# Patient Record
Sex: FEMALE | HISPANIC OR LATINO | Employment: STUDENT | URBAN - NONMETROPOLITAN AREA
[De-identification: names, ages, dates, MRNs, and addresses within clinical notes are randomized per-mention and may not be internally consistent; named-entity substitution may affect disease eponyms.]

---
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
## 2021-05-14 ENCOUNTER — HOSPITAL ENCOUNTER (EMERGENCY)
Facility: HOSPITAL | Age: 21
Discharge: HOME | End: 2021-05-14
Admitting: EMERGENCY MEDICINE
Payer: COMMERCIAL

## 2021-05-14 VITALS
RESPIRATION RATE: 16 BRPM | OXYGEN SATURATION: 99 % | HEART RATE: 73 BPM | SYSTOLIC BLOOD PRESSURE: 112 MMHG | TEMPERATURE: 97.4 F | DIASTOLIC BLOOD PRESSURE: 57 MMHG

## 2021-05-14 DIAGNOSIS — Z32.01 POSITIVE URINE PREGNANCY TEST: Primary | ICD-10-CM

## 2021-05-14 DIAGNOSIS — R30.0 DYSURIA: ICD-10-CM

## 2021-05-14 LAB
BACTERIA UR QL AUTO: NORMAL /HPF
BILIRUB UR QL STRIP: NEGATIVE
CLARITY UR: CLEAR
COLOR UR: YELLOW
EXT PREG TEST URINE: POSITIVE
EXT. CONTROL ED NAV: ABNORMAL
GLUCOSE UR STRIP-MCNC: NEGATIVE MG/DL
HGB UR QL STRIP.AUTO: NEGATIVE
KETONES UR STRIP-MCNC: NEGATIVE MG/DL
LEUKOCYTE ESTERASE UR QL STRIP: ABNORMAL
NITRITE UR QL STRIP: NEGATIVE
NON-SQ EPI CELLS URNS QL MICRO: NORMAL /HPF
PH UR STRIP.AUTO: 6 [PH]
PROT UR STRIP-MCNC: NEGATIVE MG/DL
RBC #/AREA URNS AUTO: NORMAL /HPF
SP GR UR STRIP.AUTO: 1.02 (ref 1–1.03)
UROBILINOGEN UR QL STRIP.AUTO: 0.2 E.U./DL
WBC #/AREA URNS AUTO: NORMAL /HPF

## 2021-05-14 PROCEDURE — 99283 EMERGENCY DEPT VISIT LOW MDM: CPT

## 2021-05-14 PROCEDURE — 87086 URINE CULTURE/COLONY COUNT: CPT | Performed by: PHYSICIAN ASSISTANT

## 2021-05-14 PROCEDURE — 99282 EMERGENCY DEPT VISIT SF MDM: CPT | Performed by: PHYSICIAN ASSISTANT

## 2021-05-14 PROCEDURE — 81025 URINE PREGNANCY TEST: CPT | Performed by: PHYSICIAN ASSISTANT

## 2021-05-14 PROCEDURE — 81001 URINALYSIS AUTO W/SCOPE: CPT | Performed by: PHYSICIAN ASSISTANT

## 2021-05-15 NOTE — DISCHARGE INSTRUCTIONS
You will be notified of urine culture results if positive  Please schedule follow up with OBGYN to establish care  Return immediately with any new or worsening symptoms

## 2021-05-15 NOTE — ED PROVIDER NOTES
History  Chief Complaint   Patient presents with    Vaginal Itching     onset few days ago      [place] An[place] is an otherwise healthy and well-appearing 29-year-old female arriving ambulatory to the emergency department for evaluation of dysuria x 2-3 days  She denies associated symptoms including urinary urgency, frequency, hematuria  She denies abdominal pain, flank pain, back pain  She denies abnormal vaginal bleeding or discharge,  rashes or lesions  Admits to chance of pregnancy, LMP first week of April  The patient admits that she is sexually active and uses Nuvaring for contraception  Denies prior history of urinary tract infections  She offers no constitutional complaints denying fevers, chills, sweats, appetite changes, nausea or episodes of vomiting  History provided by:  Patient      None       Past Medical History:   Diagnosis Date    Asthma        History reviewed  No pertinent surgical history  History reviewed  No pertinent family history  I have reviewed and agree with the history as documented  E-Cigarette/Vaping     E-Cigarette/Vaping Substances     Social History     Tobacco Use    Smoking status: Never Smoker    Smokeless tobacco: Never Used   Substance Use Topics    Alcohol use: Not Currently    Drug use: Not Currently       Review of Systems   Constitutional: Negative for chills, diaphoresis, fatigue and fever  Genitourinary: Positive for dysuria  Negative for flank pain, frequency, genital sores, hematuria, urgency, vaginal bleeding and vaginal discharge  Skin: Negative for rash  All other systems reviewed and are negative  Physical Exam  Physical Exam  Vitals signs and nursing note reviewed  Constitutional:       General: She is not in acute distress  Appearance: Normal appearance  She is well-developed  She is not ill-appearing, toxic-appearing or diaphoretic  HENT:      Head: Normocephalic and atraumatic     Eyes:      Conjunctiva/sclera: Conjunctivae normal       Pupils: Pupils are equal, round, and reactive to light  Neck:      Musculoskeletal: Normal range of motion and neck supple  Cardiovascular:      Rate and Rhythm: Normal rate and regular rhythm  Heart sounds: Normal heart sounds  Pulmonary:      Effort: Pulmonary effort is normal  No respiratory distress  Breath sounds: Normal breath sounds  No wheezing  Abdominal:      General: Bowel sounds are normal  There is no distension  Palpations: Abdomen is soft  Tenderness: There is no abdominal tenderness  There is no guarding or rebound  Comments: Abdomen soft, non-tender, non-distended  No peritoneal signs  Genitourinary:     Comments: Deferred  Musculoskeletal: Normal range of motion  General: No tenderness  Skin:     General: Skin is warm and dry  Capillary Refill: Capillary refill takes less than 2 seconds  Neurological:      General: No focal deficit present  Mental Status: She is alert  Mental status is at baseline     Psychiatric:         Mood and Affect: Mood normal          Vital Signs  ED Triage Vitals [05/14/21 2251]   Temperature Pulse Respirations Blood Pressure SpO2   (!) 97 4 °F (36 3 °C) 73 16 112/57 99 %      Temp src Heart Rate Source Patient Position - Orthostatic VS BP Location FiO2 (%)   -- Monitor Sitting Right arm --      Pain Score       --           Vitals:    05/14/21 2251   BP: 112/57   Pulse: 73   Patient Position - Orthostatic VS: Sitting         Visual Acuity      ED Medications  Medications - No data to display    Diagnostic Studies  Results Reviewed     Procedure Component Value Units Date/Time    Urine culture [[id number]] Collected: 05/14/21 2308    Lab Status: Final result Specimen: Urine, Clean Catch Updated: 05/16/21 0722     Urine Culture No Growth <1000 cfu/mL    Urine Microscopic [[id number]]  (Normal) Collected: 05/14/21 2308    Lab Status: Final result Specimen: Urine, Clean Catch Updated: 05/14/21 2322     RBC, UA 0-1 /hpf      WBC, UA 2-4 /hpf      Epithelial Cells Occasional /hpf      Bacteria, UA Occasional /hpf     UA w Reflex to Microscopic w Reflex to Culture [[id number]]  (Abnormal) Collected: 05/14/21 2308    Lab Status: Final result Specimen: Urine, Clean Catch Updated: 05/14/21 2315     Color, UA Yellow     Clarity, UA Clear     Specific Gravity, UA 1 025     pH, UA 6 0     Leukocytes, UA Trace     Nitrite, UA Negative     Protein, UA Negative mg/dl      Glucose, UA Negative mg/dl      Ketones, UA Negative mg/dl      Urobilinogen, UA 0 2 E U /dl      Bilirubin, UA Negative     Blood, UA Negative    POCT pregnancy, urine [[id number]]  (Abnormal) Resulted: 05/14/21 2300    Lab Status: Final result Updated: 05/14/21 2301     EXT PREG TEST UR (Ref: Negative) positive     Control valid                 No orders to display              Procedures  Procedures         ED Course  ED Course as of May 18 1232   Fri May 14, 2021   2302 PREGNANCY TEST URINE: positive                                           MDM  Number of Diagnoses or Management Options  Dysuria: new and requires workup  Positive urine pregnancy test: new and requires workup  Diagnosis management comments: This is an otherwise healthy and well-appearing 80-year-old female reporting dysuria times 2-3 days  She has no associated complaints with this, denying urinary urgency, frequency, hematuria  She denies any abnormal vaginal bleeding or discharge  Her last menstrual period was through the 1st week of April, admits to chance of pregnancy  Currently sexually active in a monogamous relationship with 1 partner  Uses NuvaRing for contraception  She has no abdominal pain, nausea or episodes of vomiting, abnormal vaginal bleeding, abnormal vaginal discharge      Differential diagnosis includes but not limited to:  Urinary tract infection, dysuria, cystitis, urethritis, pregnancy; patient declined STI testing    Initial ED plan: Ua/u preg    Final ED Assessment:  Vital signs stable on hospital arrival, examination as documented above which is largely unremarkable  Urine pregnancy test positive  The patient's urinalysis is without evidence of urinary tract infection  Given report of dysuria, will add on urine culture for further evaluation  Plan to defer initiating antibiotics while awaiting culture data  The patient was updated of testing results  Will provide OBGYN follow-up for the patient to establish care and re-evaluation  Strict hospital return precautions discussed including but not limited to severe abdominal pain, abnormal vaginal bleeding, intractable nausea or vomiting, fevers, chills, sweats, or any other new or worrisome symptoms  The patient had verbalized understanding was agreeable with disposition plan  She has remained stable during hospital course, and she is stable for hospital discharge at this time  Amount and/or Complexity of Data Reviewed  Clinical lab tests: ordered and reviewed  Review and summarize past medical records: yes  Independent visualization of images, tracings, or specimens: yes    Risk of Complications, Morbidity, and/or Mortality  Presenting problems: low  Diagnostic procedures: low  Management options: low    Patient Progress  Patient progress: stable      Disposition  Final diagnoses:   Positive urine pregnancy test   Dysuria     Time reflects when diagnosis was documented in both MDM as applicable and the Disposition within this note     Time User Action Codes Description Comment    5/14/2021 11:37 PM Simón Rodrigues Add [Z32 01] Positive urine pregnancy test     5/14/2021 11:37 PM Simón Rodrigues Add [R30 0] Dysuria       ED Disposition     ED Disposition Condition Date/Time Comment    Discharge Stable Fri May 14, 2021 11:36 PM Jayda Mckeon discharge to home/self care              Follow-up Information     Follow up With Specialties Details Why Contact Info Additional Information    Your Primary Care Provider   Please schedule follow up as needed      [address] [facility] Obstetrics and Gynecology Call   C/Mayo Atkinson  [facility] [phone] [address] [facility], C/Mayo Atkinson [facility], [address]   [phone]    Yamileth Yeager MD Obstetrics and Gynecology, Obstetrics, Gynecology Call   [address] Emergency Department Emergency Medicine  If symptoms worsen [address] [facility] [id number] [facility] Emergency Department, [address] [facility], [place], [place], [address]          There are no discharge medications for this patient  No discharge procedures on file      PDMP Review     None          ED Provider  Electronically Signed by           Keegan Lennon PA-C  05/18/21 1455

## 2021-05-16 LAB — BACTERIA UR CULT: NORMAL

## 2021-06-01 NOTE — PATIENT INSTRUCTIONS
First Trimester Pregnancy   WHAT YOU NEED TO KNOW:   The first trimester of pregnancy lasts from your last period through the 12th week of pregnancy  Follow up with your healthcare providers for prenatal care  Regular prenatal care can help to keep you and your baby healthy  DISCHARGE INSTRUCTIONS:   Return to the emergency department if:   · You have pain or cramping in your abdomen or low back  · You have severe vaginal bleeding or clotting  Call your doctor or obstetrician if:   · You have light bleeding  · You have chills or a fever  · You have vaginal itching, burning, or pain  · You have yellow, green, white, or foul-smelling vaginal discharge  · You have pain or burning when you urinate, less urine than usual, or pink or bloody urine  · You have questions or concerns about your condition or care  Follow up with your doctor or obstetrician as directed:  Go to all of your prenatal visits during your pregnancy  Write down your questions so you remember to ask them during your visits  Prenatal care:  Prenatal care is a series of visits with your healthcare provider throughout your pregnancy  Prenatal care can help prevent problems during pregnancy and childbirth  At each prenatal visit, your healthcare provider will weigh you and check your blood pressure  Your healthcare provider will also check your baby's heartbeat and growth  You may also need the following at some visits:  · A pelvic exam  allows your healthcare provider to see your cervix (the bottom part of your uterus)  Your healthcare provider will use a speculum to open your vagina  He or she will check the size and shape of your uterus  · Blood tests  may be done to check for any of the following:     ? Gestational diabetes or anemia (low iron level)    ? Blood type or Rh factor, or certain birth defects    ? Immunity to certain diseases, such as chickenpox or rubella    ?  An infection, such as a sexually transmitted infection, HIV, or hepatitis B    · Hepatitis B  may need to be prevented or treated  Hepatitis B is inflammation of the liver caused by the hepatitis B virus (HBV)  HBV can spread from a mother to her baby during delivery  You will be checked for HBV as early as possible in the first trimester of each pregnancy  You need the test even if you received the hepatitis B vaccine or were tested before  You may need to have an HBV infection treated before you give birth  · Urine tests  may also be done to check for sugar and protein  These can be signs of gestational diabetes or preeclampsia  Urine tests may also be done to check for signs of infection  · A fetal ultrasound  shows pictures of your baby inside your uterus  The pictures are used to check your baby's development, movement, and position  Your healthcare provider may be able to tell you if you are having a boy or a girl during the ultrasound  This is usually possible at around 18 to 22 weeks  · Genetic disorder screening tests  may be offered to you  These screening tests check your baby's risk for genetic disorders such as Down syndrome  A screening test includes a blood test and ultrasound  Stay healthy during pregnancy:   · Take prenatal vitamins as directed  Prenatal vitamins can help you get the amount of vitamins and minerals you need during pregnancy  Prenatal vitamins may also decrease the risk of certain birth defects  · Eat a variety of healthy foods  Healthy foods include fruits, vegetables, whole-grain breads, low-fat dairy products, beans, turkey and chicken, and lean red meat  Ask your healthcare provider for more information about foods that are healthy and safe to eat during pregnancy  · Drink liquids as directed  Ask how much liquid to drink each day and which liquids are best for you  Some healthy liquids include milk, water, and juice  It is not clear how caffeine affects pregnancy   Limit your intake of caffeine to less than 200 mg each day to avoid possible health problems  Caffeine may be found in coffee, tea, cola, sports drinks, and chocolate  Do not drink alcohol  · Talk to your healthcare provider before you take medicines  Many medicines can harm your baby, especially during early pregnancy  Ask your healthcare provider before you take any medicines, including over-the-counter medicines, vitamins, herbs, or food supplements  Never use illegal or street drugs while you are pregnant  Talk to your healthcare provider if you are having trouble quitting street drugs  · Exercise as directed  Ask your healthcare provider about the best exercise plan for you  Exercise may help you feel better and make your labor and delivery easier  · Do not smoke  Smoking increases your risk of a miscarriage and other health problems during your pregnancy  Smoking can cause your baby to be born too early or weigh less at birth  Quit smoking as soon as you think you might be pregnant  Ask your healthcare provider for information if you need help quitting  Safety tips:   · Do not use a hot tub or sauna  while you are pregnant, especially during your first trimester  Hot tubs and saunas may raise your baby's temperature and increase the risk of birth defects  It also increases your risk of miscarriage  · Protect yourself from illness  Toxoplasmosis is a disease that can cause birth defects  To protect yourself from this disease, do not clean your cat's litter box yourself  Ask someone else to clean your cat's litter box while you are pregnant  Also, do not  eat raw meat or unwashed fruits and vegetables  Wash your hands after you touch raw meat, and eat only well-cooked meat  Wash fruits and vegetables well before you eat them  © Copyright [address] Information is for End User's use only and may not be sold, redistributed or otherwise used for commercial purposes   All illustrations and images included in CareNotes® are the copyrighted property of [employer]  or [facility] [employer]tan Mason   The above information is an  only  It is not intended as medical advice for individual conditions or treatments  Talk to your doctor, nurse or pharmacist before following any medical regimen to see if it is safe and effective for you  Iron Rich Diet   WHAT YOU NEED TO KNOW:   What is an iron-rich diet? An iron-rich diet includes foods that are good sources of iron  People need extra iron during childhood, adolescence (teenage years), and pregnancy  Iron is a mineral that your body needs to make hemoglobin  Hemoglobin is part of your blood and helps carry oxygen from your lungs to the rest of your body  You may need to eat more iron-rich foods to treat or prevent a low blood iron level or iron deficiency anemia  How much iron do I need each day? · Males:      ? 3to 1years old: 7 mg    ? 3to 6years old: 10 mg    ? 5to 15years old: 8 mg    ? 15to 25years old: 11 mg    ? 19 years and older: 8 mg    · Females:      ? 3to 1years old: 7 mg    ? 3to 6years old: 10 mg    ? 5to 15years old: 8 mg    ? 15to 25years old: 15 mg    ? 19 to 50 years: 18 mg    ? Over 46years old: 8 mg    ? Pregnant women:  27 mg    Which foods contain iron? · Meat, fish, and poultry are good sources of iron  They contain heme iron, a form of iron that your body absorbs very well  Fruit, vegetables, eggs, and grains such as pasta, rice, and cereal also contain iron  They contain nonheme iron, a form of iron that is not absorbed as well as heme iron  You can absorb more iron from these foods by eating a food that is high in vitamin C at the same time  You can also absorb more nonheme iron by eating a food from the meat, fish, and poultry group at the same time  · Fish and shellfish contain some mercury, a metal that can be harmful  Children and unborn babies are at higher risk for harm caused by mercury   Children and pregnant women should avoid eating fish high in mercury, such as shark and swordfish  They should also eat only fish that are lower in mercury, such as salmon, canned light tuna, and catfish  Limit the amount of low-mercury fish and shellfish you eat to less than 12 ounces per week  What are some iron-rich foods? · Foods that contain 2 mg or more per serving:      ? 3 ounces of cooked beef (saravanan, eye of round) or cooked turkey (dark meat)    ? ½ cup of beans (black, kidney, or lentil, or soybeans)    ? ½ cup of tofu    ? 1 medium baked potato    ? 1 cup of cooked artichoke or cooked spinach    ? ¾ cup of instant oatmeal    ? 1 cup of corn flakes    · Foods that contain 1 to 2 mg per serving:      ? 3 ounces of chicken    ? 3 ounces of pork    ? 3 ounces of turkey (light meat)    ? 3 ounces of light tuna    ? ½ cup of seedless, packed raisins    ? 1 slice of whole-wheat or white bread    What are good sources of vitamin C? Eat a serving of vitamin C with any iron-rich food to help your body absorb more iron  The following fruits and vegetables are good sources of vitamin C:  · 1 cup of fresh orange juice (124 mg) or pink grapefruit juice (83 mg)    · 1 cup of strawberries (106 mg)    · 1 cup of diced cantaloupe (68 mg)     · 1 cup of sweet yellow pepper (283 mg)    · 1 cup of fresh, boiled broccoli (116 mg) or cooked brussels sprouts (97 mg)    · 1 cup of kale (53 mg)    · 1 cup of tomato juice (45 mg)    What other guidelines should I follow? · Tea and coffee can decrease the amount of iron that your body absorbs from iron-rich foods  Drink coffee and tea separately from meals that contain iron-rich foods  · Children over the age of 1 year only need about 24 ounces of cow's milk each day  When children drink too much milk, they may eat fewer iron-rich foods  This may cause them to have a low level of iron in their blood  What are the risks of not following an iron-rich diet?   If you do not include iron-rich foods and vitamin C in your diet every day, you may have low blood iron levels  This may lead to iron deficiency anemia, especially during periods when your body needs extra iron  Iron deficiency anemia may cause problems with your child's growth and development  If you have iron deficiency anemia, you may develop other health problems  CARE AGREEMENT:   You have the right to help plan your care  Discuss treatment options with your healthcare provider to decide what care you want to receive  You always have the right to refuse treatment  The above information is an  only  It is not intended as medical advice for individual conditions or treatments  Talk to your doctor, nurse or pharmacist before following any medical regimen to see if it is safe and effective for you  © Copyright [address] Information is for End User's use only and may not be sold, redistributed or otherwise used for commercial purposes   All illustrations and images included in CareNotes® are the copyrighted property of [employer] Inc  or [address] [place] [address]

## 2021-06-01 NOTE — PROGRESS NOTES
Technician: Study performed by the interpreting Certified Nurse Practitioner    Indications:  amenorrhea       Prior to use, disinfection was performed with [employer] Disinfection Process  Probe Serial Number#                                          [id number] [facility])      LMP 3/29/2021   (9 weeks 2 days gestational age based on dates)            Procedure Details  A gestational sac is seen containing a yolk sac and a fernández embryo  The embryonic crown-rump length measures 3 25 cm  and calculates to an estimated gestational age of 9 weeks and 1 Day  Embryonic cardiac activity is present @ 160 BPM     Findings:   Viable, fernández intrauterine pregnancy at 10 weeks,  1 day, (++change to PEPE)  with a calculated PEPE of 21    Plan to RTO for OB Intake  Reviewed First Trimester Screening, pt will consider

## 2021-06-02 ENCOUNTER — ULTRASOUND (OUTPATIENT)
Dept: OBGYN CLINIC | Facility: CLINIC | Age: 21
End: 2021-06-02
Payer: COMMERCIAL

## 2021-06-02 VITALS
BODY MASS INDEX: 26.7 KG/M2 | HEIGHT: 61 IN | DIASTOLIC BLOOD PRESSURE: 68 MMHG | WEIGHT: 141.4 LBS | SYSTOLIC BLOOD PRESSURE: 110 MMHG

## 2021-06-02 DIAGNOSIS — N91.2 AMENORRHEA: Primary | ICD-10-CM

## 2021-06-02 PROCEDURE — 76817 TRANSVAGINAL US OBSTETRIC: CPT | Performed by: NURSE PRACTITIONER

## 2021-06-11 ENCOUNTER — TELEMEDICINE (OUTPATIENT)
Dept: OBGYN CLINIC | Facility: CLINIC | Age: 21
End: 2021-06-11
Payer: COMMERCIAL

## 2021-06-11 DIAGNOSIS — Z34.01 ENCOUNTER FOR SUPERVISION OF NORMAL FIRST PREGNANCY IN FIRST TRIMESTER: Primary | ICD-10-CM

## 2021-06-11 PROCEDURE — T1001 NURSING ASSESSMENT/EVALUATN: HCPCS | Performed by: NURSE PRACTITIONER

## 2021-06-11 PROCEDURE — 99211 OFF/OP EST MAY X REQ PHY/QHP: CPT | Performed by: NURSE PRACTITIONER

## 2021-06-11 RX ORDER — PNV NO.95/FERROUS FUM/FOLIC AC 28MG-0.8MG
TABLET ORAL
COMMUNITY

## 2021-06-11 RX ORDER — LORATADINE 10 MG/1
10 TABLET ORAL DAILY
COMMUNITY

## 2021-06-11 NOTE — PROGRESS NOTES
OB INTAKE INTERVIEW  Patient is 24y o y o  year old who presents for OB intake at 11-3wks  She is accompanied by: phone   The father of her baby Bahman Chew) is involved in the pregnancy and is 23years old    Last Menstrual Period: 3/29/2021  Ultrasound: Measured 10 weeks 1 days on 2021  Estimated Date of Delivery: 2021 via LMP    Signs/Symptoms of Pregnancy  Current pregnancy symptoms: none  Constipation no  Headaches no  Cramping/spotting no  PICA cravings no    Diabetes-    If patient has 1 or more, please order early 1 hour GTT  History of GDM no  BMI >35 no  History of PCOS or current metformin use no  History of LGA/macrosomic infant (4000g/9lbs) no    If patient has 2 or more, please order early 1 hour GTT  BMI>30 no  AMA no  First degree relative with type 2 diabetes no  History of chronic HTN, hyperlipidemia, elevated A1C no  High risk race (, , ,  or ) YES    Hypertension- if you answer yes, please order preeclampsia labs (comprehensive metabolic panel, urine protein creatinine ratio, 24 hour urine)  History of of chronic HTN no  History of gestational HTN no  History of preeclampsia, eclampsia, or HELLP syndrome no  History of diabetes no  History of lupus, autoimmune disease, kidney disease no    Thyroid- if yes order TSH with reflex T4  History of thyroid disease no    Bleeding Disorder or Hx of DVT-patient or first degree relative with history of  Order the following if not done previously     (Factor V, antithrombin III, prothrombin gene mutation, protein C and S Ag, lupus anticoagulant, anticardiolipin, beta-2 glycoprotein)   no    OB/GYN-  History of abnormal pap smear no  History of HPV no  History of Herpes/HSV no  History of other STI (gonorrhea, chlamydia, trich) no  History of prior  no  History of prior  no  History of  delivery prior to 36 weeks 6 days no  History of blood transfusion no  Ok for blood transfusion yes    Substance screening- if yes outside of tobacco for her or anyone in her home-order urine drug screen  History of tobacco use no  Currently using tobacco no  Currently using alcohol no  Presently using drugs no  Past drug use  YES-marijuana  IV drug use-If yes add Hep C antibody to labs no  Partner drug use YES-marijuana  Parent/Family drug use no    MRSA Screening-   Does the pt have a hx of MRSA? no  If yes- please follow MRSA protocol and obtain a nasal swab for MRSA culture    Immunizations:  Influenza vaccine given this season no  Discussed Tdap vaccine yes  Discussed COVID Vaccine yes- pt declined    Genetic/MFM-  Do you or your partner have a history of any of the following in yourselves or first degree relatives? Cystic fibrosis no  Spinal muscular atrophy no  Hemoglobinopathy/Sickle Cell/Thalassemia no  Fragile X Intellectual Disability no    If yes, discuss carrier screening and recommend consultation with [facility]/genetic counseling  If no, discuss option for carrier screening and/or genetic testing with Nuchal Ul[facility]  Patient interested yes  Appointment at [facility] made no - pt to call for appt today    Interview education  [facility] reviewed and discussed yes    Nurse/Family Partnership- patient may qualify no; referral placed no    Prenatal lab work scripts   Extra labs ordered:  no    The patient has a history now or in prior pregnancy notable for:          Details that I feel the provider should be aware of: Pt lives with Lauryn MARIA, states he is supportive of pregnancy  PN1 visit scheduled  The patient was oriented to our practice, reviewed delivering physicians and [employer] for Delivery  All questions were answered  Pn phone interview completed  Pn bldwk ordered in epic  - encouraged pt to have completed prior to PN1 visit on 2021  Referral for [facility]- pt to call today in order to schedule appt-  Is interested in sequential screen   Did not receive flu vaccine this past season & is not interested in receiving Covid vaccines  Advised to call with any further questions/concerns       Interviewed by: Jana Valladares RN, [address] [facility]

## 2021-06-17 ENCOUNTER — INITIAL PRENATAL (OUTPATIENT)
Dept: OBGYN CLINIC | Facility: CLINIC | Age: 21
End: 2021-06-17
Payer: COMMERCIAL

## 2021-06-17 VITALS
DIASTOLIC BLOOD PRESSURE: 60 MMHG | HEIGHT: 61 IN | BODY MASS INDEX: 26.85 KG/M2 | WEIGHT: 142.2 LBS | SYSTOLIC BLOOD PRESSURE: 116 MMHG

## 2021-06-17 DIAGNOSIS — J45.20 MILD INTERMITTENT ASTHMA WITHOUT COMPLICATION: ICD-10-CM

## 2021-06-17 DIAGNOSIS — Z76.89 ESTABLISHING CARE WITH NEW DOCTOR, ENCOUNTER FOR: ICD-10-CM

## 2021-06-17 DIAGNOSIS — Z34.01 ENCOUNTER FOR PRENATAL CARE IN FIRST TRIMESTER OF FIRST PREGNANCY: Primary | ICD-10-CM

## 2021-06-17 DIAGNOSIS — Z86.59 HISTORY OF DEPRESSION: ICD-10-CM

## 2021-06-17 DIAGNOSIS — Z11.3 SCREENING FOR STD (SEXUALLY TRANSMITTED DISEASE): ICD-10-CM

## 2021-06-17 LAB
SL AMB  POCT GLUCOSE, UA: NEGATIVE
SL AMB POCT URINE PROTEIN: NEGATIVE

## 2021-06-17 PROCEDURE — 99213 OFFICE O/P EST LOW 20 MIN: CPT | Performed by: NURSE PRACTITIONER

## 2021-06-17 NOTE — PROGRESS NOTES
PNV1 12w2d    Patient denies any lof, vb or ctx  Patient urine is negative for glucose and protein  Patient has no concerns today

## 2021-06-17 NOTE — ASSESSMENT & PLAN NOTE
PN-1  Denies any problems today  Reviewed that pt needs to complete her OB labs  G/C added, missed urine collection today  MFM appt  scheduled on 6/24/21    No FM yet  First pregnancy  She plans to breast feed  [facility] reviewed,  Vikki reviewed in detail with pt  Declines NFP  Will need TDap@ 28 wks/Fridge sheet & Perineal massage in 3rd trimester  Reviewed SAB precautions, Call with any problems, all questions and concerns answered

## 2021-06-17 NOTE — PATIENT INSTRUCTIONS
Pregnancy at 11 to [address]:   What changes are happening in my body? You are now at the end of your first trimester and entering your second trimester  Morning sickness usually goes away by this time  You may have other symptoms such as fatigue, frequent urination, and headaches  You may have gained 2 to 4 pounds by now  How do I care for myself at this stage of my pregnancy? · Get plenty of rest   You may feel more tired than normal  You may need to take naps or go to bed earlier  · Manage nausea and vomiting  Avoid fatty and spicy foods  Eat small meals throughout the day instead of large meals  Mamie may help to decrease nausea  Ask your healthcare provider about other ways of decreasing nausea and vomiting  · Eat a variety of healthy foods  Healthy foods include fruits, vegetables, whole-grain breads, low-fat dairy foods, beans, lean meats, and fish  Drink liquids as directed  Ask how much liquid to drink each day and which liquids are best for you  Limit caffeine to less than 200 milligrams each day  Limit your intake of fish to 2 servings each week  Choose fish low in mercury such as canned light tuna, shrimp, salmon, cod, or tilapia  Do not  eat fish high in mercury such as swordfish, tilefish, helena mackerel, and shark  · Take prenatal vitamins as directed  Your need for certain vitamins and minerals, such as folic acid, increases during pregnancy  Prenatal vitamins provide some of the extra vitamins and minerals you need  Prenatal vitamins may also help to decrease the risk of certain birth defects  · Do not smoke  Smoking increases your risk of a miscarriage and other health problems during your pregnancy  Smoking can cause your baby to be born too early or weigh less at birth  Ask your healthcare provider for information if you need help quitting  · Do not drink alcohol  Alcohol passes from your body to your baby through the placenta   It can affect your baby's brain development and cause fetal alcohol syndrome (FAS)  FAS is a group of conditions that causes mental, behavior, and growth problems  · Talk to your healthcare provider before you take any medicines  Many medicines may harm your baby if you take them when you are pregnant  Do not take any medicines, vitamins, herbs, or supplements without first talking to your healthcare provider  Never use illegal or street drugs (such as marijuana or cocaine) while you are pregnant  What are some safety tips during pregnancy? · Avoid hot tubs and saunas  Do not use a hot tub or sauna while you are pregnant, especially during your first trimester  Hot tubs and saunas may raise your baby's temperature and increase the risk of birth defects  · Avoid toxoplasmosis  This is an infection caused by eating raw meat or being around infected cat feces  It can cause birth defects, miscarriages, and other problems  Wash your hands after you touch raw meat  Make sure any meat is well-cooked before you eat it  Avoid raw eggs and unpasteurized milk  Use gloves or ask someone else to clean your cat's litter box while you are pregnant  What changes are happening with my baby? Your baby has fully formed fingernails and toenails  Your baby's heartbeat can now be heard  Ask your healthcare provider if you can listen to your baby's heartbeat  By week 14, your baby is over 4 inches long from the top of the head to the rump (baby's bottom)  Your baby weighs over 3 ounces  What do I need to know about prenatal care? Prenatal care is a series of visits with your healthcare provider throughout your pregnancy  During the first 28 weeks of your pregnancy, you will see your healthcare provider 1 time each month  Prenatal care can help prevent problems during pregnancy and childbirth  Your healthcare provider will check your blood pressure and weight  Your baby's heart rate will also be checked   You may also need the following at some visits:  · A pelvic exam  allows your healthcare provider to see your cervix (the bottom part of your uterus)  Your healthcare provider will use a speculum to open your vagina  He or she will check the size and shape of your uterus  · Blood tests  may be done to check for any of the following:     ? Gestational diabetes or anemia (low iron level)    ? Blood type or Rh factor, or certain birth defects    ? Immunity to certain diseases, such as chickenpox or rubella    ? An infection, such as a sexually transmitted infection, HIV, or hepatitis B    · Hepatitis B  may need to be prevented or treated  Hepatitis B is inflammation of the liver caused by the hepatitis B virus (HBV)  HBV can spread from a mother to her baby during delivery  You will be checked for HBV as early as possible in the first trimester of each pregnancy  You need the test even if you received the hepatitis B vaccine or were tested before  You may need to have an HBV infection treated before you give birth  · Urine tests  may also be done to check for sugar and protein  These can be signs of gestational diabetes or preeclampsia  Urine tests may also be done to check for signs of infection  · A fetal ultrasound  shows pictures of your baby inside your uterus  The pictures are used to check your baby's development, movement, and position  · Genetic disorder screening tests  may be offered to you  These tests check your baby's risk for genetic disorders such as Down syndrome  A screening test includes a blood test and ultrasound  When should I seek immediate care? · You have pain or cramping in your abdomen or low back  · You have heavy vaginal bleeding or clotting  · You pass material that looks like tissue or large clots  Collect the material and bring it with you  When should I call my doctor or obstetrician? · You cannot keep food or drinks down, and you are losing weight      · You have light bleeding  · You have chills or a fever  · You have vaginal itching, burning, or pain  · You have yellow, green, white, or foul-smelling vaginal discharge  · You have pain or burning when you urinate, less urine than usual, or pink or bloody urine  · You have questions or concerns about your condition or care  CARE AGREEMENT:   You have the right to help plan your care  Learn about your health condition and how it may be treated  Discuss treatment options with your healthcare providers to decide what care you want to receive  You always have the right to refuse treatment  The above information is an  only  It is not intended as medical advice for individual conditions or treatments  Talk to your doctor, nurse or pharmacist before following any medical regimen to see if it is safe and effective for you  © Copyright [address] Information is for End User's use only and may not be sold, redistributed or otherwise used for commercial purposes   All illustrations and images included in CareNotes® are the copyrighted property of A D A M [employer] Inc  or [address] [place] [address]

## 2021-06-17 NOTE — PROGRESS NOTES
Mild intermittent asthma without complication  Occasionally uses inhaler during pollen season  History of depression  No concerns today, see counselor for therapy  Denies medication use  Encounter for prenatal care in first trimester of first pregnancy  PN-1  Denies any problems today  Reviewed that pt needs to complete her OB labs  G/C added, missed urine collection today  MFM appt  scheduled on 6/24/21    No FM yet  First pregnancy  She plans to breast feed  [facility] reviewed,  Vikki reviewed in detail with pt  Declines NFP  Will need TDap@ 28 wks/Fridge sheet & Perineal massage in 3rd trimester  Reviewed SAB precautions, Call with any problems, all questions and concerns answered

## 2021-06-23 ENCOUNTER — APPOINTMENT (OUTPATIENT)
Dept: LAB | Facility: CLINIC | Age: 21
End: 2021-06-23
Payer: COMMERCIAL

## 2021-06-23 DIAGNOSIS — Z34.01 ENCOUNTER FOR SUPERVISION OF NORMAL FIRST PREGNANCY IN FIRST TRIMESTER: ICD-10-CM

## 2021-06-23 DIAGNOSIS — Z11.3 SCREENING FOR STD (SEXUALLY TRANSMITTED DISEASE): ICD-10-CM

## 2021-06-23 LAB
ABO GROUP BLD: NORMAL
BASOPHILS # BLD AUTO: 0.02 THOUSANDS/ΜL (ref 0–0.1)
BASOPHILS NFR BLD AUTO: 0 % (ref 0–1)
BLD GP AB SCN SERPL QL: NEGATIVE
EOSINOPHIL # BLD AUTO: 0.08 THOUSAND/ΜL (ref 0–0.61)
EOSINOPHIL NFR BLD AUTO: 1 % (ref 0–6)
ERYTHROCYTE [DISTWIDTH] IN BLOOD BY AUTOMATED COUNT: 13.1 % (ref 11.6–15.1)
HCT VFR BLD AUTO: 34.4 % (ref 34.8–46.1)
HGB BLD-MCNC: 11 G/DL (ref 11.5–15.4)
IMM GRANULOCYTES # BLD AUTO: 0.02 THOUSAND/UL (ref 0–0.2)
IMM GRANULOCYTES NFR BLD AUTO: 0 % (ref 0–2)
LYMPHOCYTES # BLD AUTO: 1.88 THOUSANDS/ΜL (ref 0.6–4.47)
LYMPHOCYTES NFR BLD AUTO: 24 % (ref 14–44)
MCH RBC QN AUTO: 29.7 PG (ref 26.8–34.3)
MCHC RBC AUTO-ENTMCNC: 32 G/DL (ref 31.4–37.4)
MCV RBC AUTO: 93 FL (ref 82–98)
MONOCYTES # BLD AUTO: 0.47 THOUSAND/ΜL (ref 0.17–1.22)
MONOCYTES NFR BLD AUTO: 6 % (ref 4–12)
NEUTROPHILS # BLD AUTO: 5.26 THOUSANDS/ΜL (ref 1.85–7.62)
NEUTS SEG NFR BLD AUTO: 69 % (ref 43–75)
NRBC BLD AUTO-RTO: 0 /100 WBCS
PLATELET # BLD AUTO: 205 THOUSANDS/UL (ref 149–390)
PMV BLD AUTO: 11.9 FL (ref 8.9–12.7)
RBC # BLD AUTO: 3.7 MILLION/UL (ref 3.81–5.12)
RH BLD: POSITIVE
WBC # BLD AUTO: 7.73 THOUSAND/UL (ref 4.31–10.16)

## 2021-06-23 PROCEDURE — 80081 OBSTETRIC PANEL INC HIV TSTG: CPT

## 2021-06-23 PROCEDURE — 36415 COLL VENOUS BLD VENIPUNCTURE: CPT

## 2021-06-23 PROCEDURE — 87086 URINE CULTURE/COLONY COUNT: CPT

## 2021-06-23 PROCEDURE — 81001 URINALYSIS AUTO W/SCOPE: CPT

## 2021-06-23 PROCEDURE — 87591 N.GONORRHOEAE DNA AMP PROB: CPT

## 2021-06-23 PROCEDURE — 87491 CHLMYD TRACH DNA AMP PROBE: CPT

## 2021-06-24 ENCOUNTER — ROUTINE PRENATAL (OUTPATIENT)
Dept: PERINATAL CARE | Facility: OTHER | Age: 21
End: 2021-06-24
Payer: COMMERCIAL

## 2021-06-24 VITALS
WEIGHT: 141.6 LBS | HEART RATE: 94 BPM | BODY MASS INDEX: 26.73 KG/M2 | SYSTOLIC BLOOD PRESSURE: 95 MMHG | HEIGHT: 61 IN | DIASTOLIC BLOOD PRESSURE: 60 MMHG

## 2021-06-24 DIAGNOSIS — Z34.01 ENCOUNTER FOR SUPERVISION OF NORMAL FIRST PREGNANCY IN FIRST TRIMESTER: ICD-10-CM

## 2021-06-24 DIAGNOSIS — Z3A.13 13 WEEKS GESTATION OF PREGNANCY: ICD-10-CM

## 2021-06-24 DIAGNOSIS — Z36.82 ENCOUNTER FOR (NT) NUCHAL TRANSLUCENCY SCAN: Primary | ICD-10-CM

## 2021-06-24 LAB
BACTERIA UR QL AUTO: ABNORMAL /HPF
BILIRUB UR QL STRIP: NEGATIVE
C TRACH DNA SPEC QL NAA+PROBE: NEGATIVE
CLARITY UR: ABNORMAL
COLOR UR: YELLOW
GLUCOSE UR STRIP-MCNC: NEGATIVE MG/DL
HBV SURFACE AG SER QL: NORMAL
HGB UR QL STRIP.AUTO: NEGATIVE
HIV 1+2 AB+HIV1 P24 AG SERPL QL IA: NORMAL
KETONES UR STRIP-MCNC: NEGATIVE MG/DL
LEUKOCYTE ESTERASE UR QL STRIP: ABNORMAL
N GONORRHOEA DNA SPEC QL NAA+PROBE: NEGATIVE
NITRITE UR QL STRIP: NEGATIVE
NON-SQ EPI CELLS URNS QL MICRO: ABNORMAL /HPF
OTHER STN SPEC: ABNORMAL
PH UR STRIP.AUTO: 6 [PH]
PROT UR STRIP-MCNC: NEGATIVE MG/DL
RBC #/AREA URNS AUTO: ABNORMAL /HPF
RPR SER QL: NORMAL
RUBV IGG SERPL IA-ACNC: 19.9 IU/ML
SP GR UR STRIP.AUTO: 1.03 (ref 1–1.03)
UROBILINOGEN UR QL STRIP.AUTO: 0.2 E.U./DL
WBC #/AREA URNS AUTO: ABNORMAL /HPF

## 2021-06-24 PROCEDURE — 76813 OB US NUCHAL MEAS 1 GEST: CPT | Performed by: OBSTETRICS & GYNECOLOGY

## 2021-06-24 PROCEDURE — 99242 OFF/OP CONSLTJ NEW/EST SF 20: CPT | Performed by: OBSTETRICS & GYNECOLOGY

## 2021-06-24 RX ORDER — ASPIRIN 81 MG/1
162 TABLET ORAL DAILY
Qty: 60 TABLET | Refills: 5 | Status: SHIPPED | OUTPATIENT
Start: 2021-06-24 | End: 2021-07-24 | Stop reason: SDUPTHER

## 2021-06-24 RX ORDER — ALBUTEROL SULFATE 90 UG/1
2 AEROSOL, METERED RESPIRATORY (INHALATION) EVERY 6 HOURS PRN
COMMUNITY
End: 2022-06-10 | Stop reason: SDUPTHER

## 2021-06-24 NOTE — PATIENT INSTRUCTIONS
The use of low dose aspirin in pregnancy (81-162mg) is recommended in women with a high risk, or multiple moderate risk factors for preeclampsia  Aspirin therapy should be initiated between 12-28 weeks gestation, and is most effective if started prior to 16 weeks gestation, and continued until 36 weeks gestation  Low dose aspirin in pregnancy has been shown to reduce the incidence of preeclampsia in women with risk factors, and has been shown to be safe and without significant maternal or fetal risk  In light of your risk factors for preeclampsia, including: Primiparity (first pregnancy) and  Race I recommend initiating aspirin therapy, which was prescribed today  Thank you for choosing [facility] for your visit today  We appreciate your trust and the opportunity to assist your obstetrician with your care  We value your feedback regarding the care we are providing  Following today's appointment, you may receive a patient satisfaction survey by mail or e-mail requesting feedback on your visit  We ask that you complete the survey to  help us understand how we are doing  Thank you for in advance for your feedback

## 2021-06-24 NOTE — LETTER
June 24, 2021     Juan Cordero, [address] [facility] [place] [address]    Patient: Caron Munguia   YOB: 2000   Date of Visit: 6/24/2021       Dear Ashley Fallon: Thank you for referring Caron Munguia to me for evaluation  Below are my notes for this consultation  If you have questions, please do not hesitate to call me  I look forward to following your patient along with you  Sincerely,        Chirag Reilly MD        CC: No Recipients  Chirag Reilly MD  6/24/2021  1:59 PM  Sign when Signing Visit  [address]: Ms Butch Easton was seen today at 13w2d for nuchal translucency ultrasound  See ultrasound report under "OB Procedures" tab  My recommendations are as follows:  1  We reviewed the availability of genetic screening, as well as diagnostic testing, which are available to all pregnant women  We reviewed limitations, risks, and benefits of screening and testing  We reviewed the differences between Sequential Screen and NIPT (non-invasive prenatal screening) as well as that insurance coverage and therefore out-of-pocket costs may vary  She elected to proceed with NIPT, and was provided a lab requisition to have it drawn, as well as information on how to estimate her out of pocket cost and need for possible prior authorization  MSAFP screening should be ordered through your office at 16-18 weeks gestation, and completed prior to fetal anatomic survey  She does not wish to pursue diagnostic testing at this time  A detailed anatomic survey as well as transvaginal cervical length screening are recommended between 18-22 weeks gestation  2  We reviewed her history of allergy-induced asthma  Asthma complicates 8-2% of pregnancies  The majority of women with asthma experience stability or improvement in symptoms in pregnancy, while approximately 1/3 experience worsening   Need for a rescue inhaler more than twice weekly indicates suboptimal asthma control and need for escalation in therapy  The majority of asthma medications are safe for pregnancy, and disease control is important for maternal and fetal health in pregnancy  If her symptoms remain well-controlled in pregnancy, no additional obstetric surveillance is indicated  However, if control is poor, there is a risk of fetal growth restriction, and evaluation of fetal growth in the second half of pregnancy is likely warranted  Prevention of respiratory morbidity is particularly important in pregnancy  Annual influenza recommendation is recommended, as is pneumococcal vaccination if not performed previously  She was encouraged to notify her provider if she is consistently using albuterol more than twice weekly, in which case additional treatment is necessary  3  We reviewed her history of depression  She was encouraged to ask for help if she experiences worsening mood during or after pregnancy  The relative safety of therapy and medication during and after pregnancy, compared with uncontrolled maternal mood disorders was emphasized  I recommend early postpartum follow-up of her mood, and referral for therapy if needed  4  The use of low dose aspirin in pregnancy (81-162mg) is recommended in women with a high risk, or multiple moderate risk factors for preeclampsia  Aspirin therapy should be initiated between 12-28 weeks gestation, and is most effective if started prior to 16 weeks gestation, and stopped by 36 weeks gestation  Low dose aspirin in pregnancy has been shown to reduce the incidence of preeclampsia in women with risk factors, and has been shown to be safe and without significant maternal or fetal risk  In light of her risk factors which include ethnicity and primigravida, I recommend initiating aspirin therapy, which was prescribed today       Please don't hesitate to contact our office with any concerns or questions     -Mily Edwards MD

## 2021-06-24 NOTE — PROGRESS NOTES
[address]: Ms Chang Payne was seen today at 13w2d for nuchal translucency ultrasound  See ultrasound report under "OB Procedures" tab  My recommendations are as follows:  1  We reviewed the availability of genetic screening, as well as diagnostic testing, which are available to all pregnant women  We reviewed limitations, risks, and benefits of screening and testing  We reviewed the differences between Sequential Screen and NIPT (non-invasive prenatal screening) as well as that insurance coverage and therefore out-of-pocket costs may vary  She elected to proceed with NIPT, and was provided a lab requisition to have it drawn, as well as information on how to estimate her out of pocket cost and need for possible prior authorization  MSAFP screening should be ordered through your office at 16-18 weeks gestation, and completed prior to fetal anatomic survey  She does not wish to pursue diagnostic testing at this time  A detailed anatomic survey as well as transvaginal cervical length screening are recommended between 18-22 weeks gestation  2  We reviewed her history of allergy-induced asthma  Asthma complicates 4-5% of pregnancies  The majority of women with asthma experience stability or improvement in symptoms in pregnancy, while approximately 1/3 experience worsening  Need for a rescue inhaler more than twice weekly indicates suboptimal asthma control and need for escalation in therapy  The majority of asthma medications are safe for pregnancy, and disease control is important for maternal and fetal health in pregnancy  If her symptoms remain well-controlled in pregnancy, no additional obstetric surveillance is indicated  However, if control is poor, there is a risk of fetal growth restriction, and evaluation of fetal growth in the second half of pregnancy is likely warranted  Prevention of respiratory morbidity is particularly important in pregnancy   Annual influenza recommendation is recommended, as is pneumococcal vaccination if not performed previously  She was encouraged to notify her provider if she is consistently using albuterol more than twice weekly, in which case additional treatment is necessary  3  We reviewed her history of depression  She was encouraged to ask for help if she experiences worsening mood during or after pregnancy  The relative safety of therapy and medication during and after pregnancy, compared with uncontrolled maternal mood disorders was emphasized  I recommend early postpartum follow-up of her mood, and referral for therapy if needed  4  The use of low dose aspirin in pregnancy (81-162mg) is recommended in women with a high risk, or multiple moderate risk factors for preeclampsia  Aspirin therapy should be initiated between 12-28 weeks gestation, and is most effective if started prior to 16 weeks gestation, and stopped by 36 weeks gestation  Low dose aspirin in pregnancy has been shown to reduce the incidence of preeclampsia in women with risk factors, and has been shown to be safe and without significant maternal or fetal risk  In light of her risk factors which include ethnicity and primigravida, I recommend initiating aspirin therapy, which was prescribed today       Please don't hesitate to contact our office with any concerns or questions     -Natalie Proctor MD

## 2021-06-24 NOTE — PROGRESS NOTES
Patient given lab slip for Noninvasive Prenatal Screen [employer] [facility]  Blood sample is drawn at [facility] and online appointment scheduling and lab locations can be found @ www  [employer]     Qnatal  card given, patient instructed how to check her out- of -pocket responsibility @ Prot[facility]  Patient made aware if Qnatal  unable to give an estimate she will need to contact [facility] office prior to blood draw  Insurance may require prior authorization, if test drawn without prior authorization pateint will be responsible for full cost of test   All [employer] products require prior authorization @ this time, [facility] office will initiate the authorization, this may take 7-14 days  Patient aware that  is provided by third party and is only an estimate of cost not a guarantee  For definitive cost, patient encouraged to call her insurance provider- insurance phone # located on the back of her ID card  Patient verbalized understanding of all instructions and no questions at this time

## 2021-06-25 LAB — BACTERIA UR CULT: NORMAL

## 2021-07-13 ENCOUNTER — ROUTINE PRENATAL (OUTPATIENT)
Dept: OBGYN CLINIC | Facility: CLINIC | Age: 21
End: 2021-07-13
Payer: COMMERCIAL

## 2021-07-13 VITALS — SYSTOLIC BLOOD PRESSURE: 110 MMHG | DIASTOLIC BLOOD PRESSURE: 62 MMHG | BODY MASS INDEX: 27.02 KG/M2 | WEIGHT: 143 LBS

## 2021-07-13 DIAGNOSIS — Z34.02 ENCOUNTER FOR PRENATAL CARE IN SECOND TRIMESTER OF FIRST PREGNANCY: Primary | ICD-10-CM

## 2021-07-13 PROCEDURE — 99213 OFFICE O/P EST LOW 20 MIN: CPT | Performed by: STUDENT IN AN ORGANIZED HEALTH CARE EDUCATION/TRAINING PROGRAM

## 2021-07-13 NOTE — ASSESSMENT & PLAN NOTE
-precautions reviewed  -preeclampsia risk factors reviewed  -prepregnancy BMI 26 with goal weight gain 15-25#: TWG = 4#, exercising  -no longer desires genetic screening; was interested in NIPT for gender and this was not covered

## 2021-07-13 NOTE — PROGRESS NOTES
Pt is here for routine ob visit   No concerns at this time  Unable to void   No VB,cramping  pn1 labs completed   RH  +

## 2021-07-13 NOTE — PROGRESS NOTES
24 y o   at 16w0d, here for return OB visit  Feeling well overall and without concerns  Starting to feel FM  Denies LOF, VB, contractions  Denies dysuria, hematuria  No problems with activity  Biking, doing cardio  Wondering about preeclampsia risk factors - taking ASA        Problem List Items Addressed This Visit        Other    Encounter for prenatal care in second trimester of first pregnancy - Primary     -precautions reviewed  -preeclampsia risk factors reviewed  -prepregnancy BMI 26 with goal weight gain 15-25#: TWG = 4#, exercising  -no longer desires genetic screening; was interested in NIPT for gender and this was not covered

## 2021-07-24 DIAGNOSIS — Z3A.13 13 WEEKS GESTATION OF PREGNANCY: ICD-10-CM

## 2021-07-26 ENCOUNTER — TELEPHONE (OUTPATIENT)
Dept: PERINATAL CARE | Facility: CLINIC | Age: 21
End: 2021-07-26

## 2021-07-27 RX ORDER — ASPIRIN 81 MG/1
162 TABLET ORAL DAILY
Qty: 60 TABLET | Refills: 0 | Status: SHIPPED | OUTPATIENT
Start: 2021-07-27 | End: 2021-10-08 | Stop reason: SDUPTHER

## 2021-08-06 NOTE — PATIENT INSTRUCTIONS
Valuable Online Resource:    [facility] pregnancy essential guide    [email or web address] addison/      On the right side of the screen is a 50 page guide providing valuable information about your entire pregnancy  On the left hand side of the site you will see several other links to great information and resources that [facility] offers     If you click on the tab that says "Pregnancy and Birth Packet" this opens another 150 page guide to labor and delivery information as well as breast feeding information,  care, pediatricians, car seat safety and much more     The [facility] and [address] tab has a virtual tour of the new L&D unit, as well as valuable information about classes that are offered, breast feeding support, support groups and much more  Click around and enjoy all we have to offer! Please note that all information in regards to locations and visiting hours have not been updated due to COVID            We only deliver our babies at one location which is at [facility] [address]          Pregnancy at 22 to [address]:   What changes are happening to your body: You are now close to or at the beginning of the third trimester  The third trimester starts at 24 weeks and ends with delivery  As your baby gets larger, you may develop certain symptoms  These may include pain in your back or down the sides of your abdomen  You may also have stretch marks on your abdomen, breasts, thighs, or buttocks  You may also have constipation  Seek care immediately if:   · You develop a severe headache that does not go away  · You have new or increased vision changes, such as blurred or spotted vision  · You have new or increased swelling in your face or hands  · You have vaginal spotting or bleeding  · Your water broke or you feel warm water gushing or trickling from your vagina      Contact your healthcare provider if:   · You have abdominal cramps, pressure, or tightening  · You have a change in vaginal discharge  · You have light bleeding  · You have chills or a fever  · You have vaginal itching, burning, or pain  · You have yellow, green, white, or foul-smelling vaginal discharge  · You have pain or burning when you urinate, less urine than usual, or pink or bloody urine  · You have questions or concerns about your condition or care  How to care for yourself at this stage of your pregnancy:   · Eat a variety of healthy foods  Healthy foods include fruits, vegetables, whole-grain breads, low-fat dairy foods, beans, lean meats, and fish  Drink liquids as directed  Ask how much liquid to drink each day and which liquids are best for you  Limit caffeine to less than 200 milligrams each day  Limit your intake of fish to 2 servings each week  Choose fish low in mercury such as canned light tuna, shrimp, salmon, cod, or tilapia  Do not  eat fish high in mercury such as swordfish, tilefish, helena mackerel, and shark  · Manage back pain  Do not stand for long periods of time or lift heavy items  Use good posture while you stand, squat, or bend  Wear low-heeled shoes with good support  Rest may also help to relieve back pain  Ask your healthcare provider about exercises you can do to strengthen your back muscles  · Take prenatal vitamins as directed  Your need for certain vitamins and minerals, such as folic acid, increases during pregnancy  Prenatal vitamins provide some of the extra vitamins and minerals you need  Prenatal vitamins may also help to decrease the risk of certain birth defects  · Talk to your healthcare provider about exercise  Moderate exercise can help you stay fit  Your healthcare provider will help you plan an exercise program that is safe for you during pregnancy  · Do not smoke    Smoking increases your risk of a miscarriage and other health problems during your pregnancy  Smoking can cause your baby to be born too early or weigh less at birth  Ask your healthcare provider for information if you need help quitting  · Do not drink alcohol  Alcohol passes from your body to your baby through the placenta  It can affect your baby's brain development and cause fetal alcohol syndrome (FAS)  FAS is a group of conditions that causes mental, behavior, and growth problems  · Talk to your healthcare provider before you take any medicines  Many medicines may harm your baby if you take them when you are pregnant  Do not take any medicines, vitamins, herbs, or supplements without first talking to your healthcare provider  Never use illegal or street drugs (such as marijuana or cocaine) while you are pregnant  Safety tips during pregnancy:   · Avoid hot tubs and saunas  Do not use a hot tub or sauna while you are pregnant, especially during your first trimester  Hot tubs and saunas may raise your baby's temperature and increase the risk of birth defects  · Avoid toxoplasmosis  This is an infection caused by eating raw meat or being around infected cat feces  It can cause birth defects, miscarriages, and other problems  Wash your hands after you touch raw meat  Make sure any meat is well-cooked before you eat it  Avoid raw eggs and unpasteurized milk  Use gloves or ask someone else to clean your cat's litter box while you are pregnant  Changes that are happening with your baby:  By 26 weeks, your baby will weigh about 2 pounds  Your baby will be about 10 inches long from the top of the head to the rump (baby's bottom)  Your baby's movements are much stronger now  Your baby's eyes are almost completely formed and can partially open  Your baby also sleeps and wakes up  What you need to know about prenatal care: Your healthcare provider will check your blood pressure and weight   You may also need the following:  · A urine test  may also be done to check for sugar and protein  These can be signs of gestational diabetes or infection  Protein in your urine may also be a sign of preeclampsia  Preeclampsia is a condition that can develop during week 20 or later of your pregnancy  It causes high blood pressure, and it can cause problems with your kidneys and other organs  · Fundal height  is a measurement of your uterus to check your baby's growth  This number is usually the same as the number of weeks that you have been pregnant  · Your baby's heart rate  will be checked  © Copyright [employer] 2021 Information is for End User's use only and may not be sold, redistributed or otherwise used for commercial purposes  All illustrations and images included in CareNotes® are the copyrighted property of [employer]  or [facility] Chetan Mason   The above information is an  only  It is not intended as medical advice for individual conditions or treatments  Talk to your doctor, nurse or pharmacist before following any medical regimen to see if it is safe and effective for you

## 2021-08-06 NOTE — ASSESSMENT & PLAN NOTE
Ephraim Gordillo is a 24 y o   19w3d  Feels well  Denies LOF/CTX/VB  Reports an episode of feeling dizzy, blurry vision and needing to sit down while shopping at [employer] last week  Immediatley resolved after sitting, advised adequate hydration with H2O or non caffeine products  Advised to call and report if further episodes occur  Being seen at [facility] today for 20 week anatomy scan   AFP ordered   Pregnancy Essential guide and Baby and Me center web site recommended

## 2021-08-09 PROBLEM — O09.892 HIGH RISK TEEN PREGNANCY IN SECOND TRIMESTER: Status: ACTIVE | Noted: 2021-08-09

## 2021-08-09 NOTE — PROGRESS NOTES
Please refer to the [facility] ultrasound report in Ob Procedures for additional information regarding today's visit

## 2021-08-10 ENCOUNTER — ROUTINE PRENATAL (OUTPATIENT)
Dept: OBGYN CLINIC | Facility: CLINIC | Age: 21
End: 2021-08-10

## 2021-08-10 ENCOUNTER — ROUTINE PRENATAL (OUTPATIENT)
Dept: PERINATAL CARE | Facility: OTHER | Age: 21
End: 2021-08-10
Payer: COMMERCIAL

## 2021-08-10 VITALS
BODY MASS INDEX: 27.75 KG/M2 | WEIGHT: 147 LBS | HEIGHT: 61 IN | SYSTOLIC BLOOD PRESSURE: 110 MMHG | HEART RATE: 86 BPM | DIASTOLIC BLOOD PRESSURE: 72 MMHG

## 2021-08-10 VITALS
SYSTOLIC BLOOD PRESSURE: 110 MMHG | DIASTOLIC BLOOD PRESSURE: 70 MMHG | BODY MASS INDEX: 27.75 KG/M2 | WEIGHT: 147 LBS | HEIGHT: 61 IN

## 2021-08-10 DIAGNOSIS — Z36.3 ENCOUNTER FOR ANTENATAL SCREENING FOR MALFORMATIONS: ICD-10-CM

## 2021-08-10 DIAGNOSIS — Z3A.20 20 WEEKS GESTATION OF PREGNANCY: ICD-10-CM

## 2021-08-10 DIAGNOSIS — Z34.02 ENCOUNTER FOR SUPERVISION OF NORMAL FIRST PREGNANCY IN SECOND TRIMESTER: Primary | ICD-10-CM

## 2021-08-10 DIAGNOSIS — Z36.86 ENCOUNTER FOR ANTENATAL SCREENING FOR CERVICAL LENGTH: ICD-10-CM

## 2021-08-10 DIAGNOSIS — O09.892 HIGH RISK TEEN PREGNANCY IN SECOND TRIMESTER: Primary | ICD-10-CM

## 2021-08-10 LAB
SL AMB  POCT GLUCOSE, UA: NEGATIVE
SL AMB POCT URINE PROTEIN: NEGATIVE

## 2021-08-10 PROCEDURE — PNV: Performed by: OBSTETRICS & GYNECOLOGY

## 2021-08-10 PROCEDURE — 76805 OB US >/= 14 WKS SNGL FETUS: CPT | Performed by: OBSTETRICS & GYNECOLOGY

## 2021-08-10 PROCEDURE — 76817 TRANSVAGINAL US OBSTETRIC: CPT | Performed by: OBSTETRICS & GYNECOLOGY

## 2021-08-10 PROCEDURE — 99213 OFFICE O/P EST LOW 20 MIN: CPT | Performed by: OBSTETRICS & GYNECOLOGY

## 2021-08-10 RX ORDER — FERROUS SULFATE TAB EC 324 MG (65 MG FE EQUIVALENT) 324 (65 FE) MG
324 TABLET DELAYED RESPONSE ORAL
Qty: 30 TABLET | Refills: 3 | Status: SHIPPED | OUTPATIENT
Start: 2021-08-10 | End: 2021-10-08 | Stop reason: SDUPTHER

## 2021-08-10 NOTE — PROGRESS NOTES
Encounter for prenatal care in second trimester of first pregnancy  Lonnie Fernandez is a 24 y o   19w3d  Feels well  Denies LOF/CTX/VB  Reports an episode of feeling dizzy, blurry vision and needing to sit down while shopping at [facility] last week  Immediatley resolved after sitting, advised adequate hydration with H2O or non caffeine products  Advised to call and report if further episodes occur  Being seen at [facility] today for 20 week anatomy scan   AFP ordered   Pregnancy Essential guide and Baby and Me center web site recommended

## 2021-08-10 NOTE — PROGRESS NOTES
Patient is here for her routine prenatal visit with her partner   Patient reports positive fetal movements she is taking 2 Aspirin daily plus her prenatal vitamins daily  Patient reports that she was in [address] last week and got dizzy in the store will like to know if she can get Iron pills   Patient is planning to breast feed the baby

## 2021-08-10 NOTE — LETTER
August 10, 2021     [facility]  [address]    Patient: Thais Tijerina   YOB: 2000   Date of Visit: 8/10/2021       Dear Dr Gilford Hora: Thank you for referring Thais Tijerina to me for evaluation  Below are my notes for this consultation  If you have questions, please do not hesitate to call me  I look forward to following your patient along with you  Sincerely,        Maida Benavides MD        CC: No Recipients  Maida Benavides MD  8/9/2021  8:31 AM  Sign when Signing Visit   Please refer to the [facility] ultrasound report in Ob Procedures for additional information regarding today's visit

## 2021-08-10 NOTE — PROGRESS NOTES
Ultrasound Probe Disinfection    A transvaginal ultrasound was performed  Prior to use, disinfection was performed with High Level Disinfection Process (Trophon)  Probe serial number [id number]: [id number] was used        [facility]  08/10/21  7:50 AM

## 2021-09-07 ENCOUNTER — ROUTINE PRENATAL (OUTPATIENT)
Dept: OBGYN CLINIC | Facility: CLINIC | Age: 21
End: 2021-09-07
Payer: COMMERCIAL

## 2021-09-07 VITALS — BODY MASS INDEX: 29.25 KG/M2 | DIASTOLIC BLOOD PRESSURE: 70 MMHG | WEIGHT: 154.8 LBS | SYSTOLIC BLOOD PRESSURE: 128 MMHG

## 2021-09-07 DIAGNOSIS — Z34.02 ENCOUNTER FOR SUPERVISION OF NORMAL FIRST PREGNANCY IN SECOND TRIMESTER: Primary | ICD-10-CM

## 2021-09-07 LAB
SL AMB  POCT GLUCOSE, UA: NEGATIVE
SL AMB POCT URINE PROTEIN: NEGATIVE

## 2021-09-07 PROCEDURE — 99213 OFFICE O/P EST LOW 20 MIN: CPT | Performed by: STUDENT IN AN ORGANIZED HEALTH CARE EDUCATION/TRAINING PROGRAM

## 2021-09-07 NOTE — PROGRESS NOTES
Pt presents for routine prenatal visit   Denies any bleeding, cramping, LOF   +FM     28 week labs given

## 2021-09-07 NOTE — PROGRESS NOTES
Encounter for supervision of normal first pregnancy in second trimester  25 yo G1 at 24+0 here for routine ob visit  Feeling well  No contractions, leaking or bleeding  Good fetal movement  28 wk slips given and reviewed timing  Return in 4 wks

## 2021-09-07 NOTE — ASSESSMENT & PLAN NOTE
23 yo G1 at 24+0 here for routine ob visit  Feeling well  No contractions, leaking or bleeding  Good fetal movement  28 wk slips given and reviewed timing  Return in 4 wks

## 2021-09-29 ENCOUNTER — APPOINTMENT (OUTPATIENT)
Dept: LAB | Facility: CLINIC | Age: 21
End: 2021-09-29
Payer: COMMERCIAL

## 2021-09-29 DIAGNOSIS — Z34.02 ENCOUNTER FOR SUPERVISION OF NORMAL FIRST PREGNANCY IN SECOND TRIMESTER: ICD-10-CM

## 2021-09-29 LAB
BASOPHILS # BLD AUTO: 0.03 THOUSANDS/ΜL (ref 0–0.1)
BASOPHILS NFR BLD AUTO: 0 % (ref 0–1)
EOSINOPHIL # BLD AUTO: 0.08 THOUSAND/ΜL (ref 0–0.61)
EOSINOPHIL NFR BLD AUTO: 1 % (ref 0–6)
ERYTHROCYTE [DISTWIDTH] IN BLOOD BY AUTOMATED COUNT: 12.8 % (ref 11.6–15.1)
GLUCOSE 1H P 50 G GLC PO SERPL-MCNC: 120 MG/DL (ref 40–134)
HCT VFR BLD AUTO: 33.1 % (ref 34.8–46.1)
HGB BLD-MCNC: 10.8 G/DL (ref 11.5–15.4)
IMM GRANULOCYTES # BLD AUTO: 0.04 THOUSAND/UL (ref 0–0.2)
IMM GRANULOCYTES NFR BLD AUTO: 0 % (ref 0–2)
LYMPHOCYTES # BLD AUTO: 1.79 THOUSANDS/ΜL (ref 0.6–4.47)
LYMPHOCYTES NFR BLD AUTO: 18 % (ref 14–44)
MCH RBC QN AUTO: 31.4 PG (ref 26.8–34.3)
MCHC RBC AUTO-ENTMCNC: 32.6 G/DL (ref 31.4–37.4)
MCV RBC AUTO: 96 FL (ref 82–98)
MONOCYTES # BLD AUTO: 0.58 THOUSAND/ΜL (ref 0.17–1.22)
MONOCYTES NFR BLD AUTO: 6 % (ref 4–12)
NEUTROPHILS # BLD AUTO: 7.34 THOUSANDS/ΜL (ref 1.85–7.62)
NEUTS SEG NFR BLD AUTO: 75 % (ref 43–75)
NRBC BLD AUTO-RTO: 0 /100 WBCS
PLATELET # BLD AUTO: 232 THOUSANDS/UL (ref 149–390)
PMV BLD AUTO: 11.9 FL (ref 8.9–12.7)
RBC # BLD AUTO: 3.44 MILLION/UL (ref 3.81–5.12)
WBC # BLD AUTO: 9.86 THOUSAND/UL (ref 4.31–10.16)

## 2021-09-29 PROCEDURE — 85025 COMPLETE CBC W/AUTO DIFF WBC: CPT

## 2021-09-29 PROCEDURE — 86592 SYPHILIS TEST NON-TREP QUAL: CPT

## 2021-09-29 PROCEDURE — 82950 GLUCOSE TEST: CPT

## 2021-09-29 PROCEDURE — 36415 COLL VENOUS BLD VENIPUNCTURE: CPT

## 2021-09-30 ENCOUNTER — TELEPHONE (OUTPATIENT)
Dept: LABOR AND DELIVERY | Facility: HOSPITAL | Age: 21
End: 2021-09-30

## 2021-09-30 LAB — RPR SER QL: NORMAL

## 2021-09-30 NOTE — TELEPHONE ENCOUNTER
28 wks labs normal glucose  Mild anemia- can we check if patient is compliant with oral iron and how she is taking it?

## 2021-10-05 ENCOUNTER — ULTRASOUND (OUTPATIENT)
Dept: PERINATAL CARE | Facility: OTHER | Age: 21
End: 2021-10-05
Payer: COMMERCIAL

## 2021-10-05 ENCOUNTER — ROUTINE PRENATAL (OUTPATIENT)
Dept: OBGYN CLINIC | Facility: CLINIC | Age: 21
End: 2021-10-05
Payer: COMMERCIAL

## 2021-10-05 VITALS
WEIGHT: 160 LBS | BODY MASS INDEX: 30.21 KG/M2 | SYSTOLIC BLOOD PRESSURE: 100 MMHG | DIASTOLIC BLOOD PRESSURE: 62 MMHG | HEIGHT: 61 IN

## 2021-10-05 VITALS
HEIGHT: 61 IN | SYSTOLIC BLOOD PRESSURE: 101 MMHG | HEART RATE: 101 BPM | BODY MASS INDEX: 30.58 KG/M2 | WEIGHT: 162 LBS | DIASTOLIC BLOOD PRESSURE: 63 MMHG

## 2021-10-05 DIAGNOSIS — Z36.4 ULTRASOUND FOR ANTENATAL SCREENING FOR FETAL GROWTH RESTRICTION: Primary | ICD-10-CM

## 2021-10-05 DIAGNOSIS — Z3A.28 28 WEEKS GESTATION OF PREGNANCY: ICD-10-CM

## 2021-10-05 DIAGNOSIS — Z34.02 ENCOUNTER FOR PRENATAL CARE IN SECOND TRIMESTER OF FIRST PREGNANCY: Primary | ICD-10-CM

## 2021-10-05 DIAGNOSIS — Z36.89 ENCOUNTER FOR FETAL ANATOMIC SURVEY: ICD-10-CM

## 2021-10-05 LAB
SL AMB  POCT GLUCOSE, UA: NORMAL
SL AMB POCT URINE PROTEIN: NORMAL

## 2021-10-05 PROCEDURE — 99213 OFFICE O/P EST LOW 20 MIN: CPT | Performed by: OBSTETRICS & GYNECOLOGY

## 2021-10-05 PROCEDURE — 99213 OFFICE O/P EST LOW 20 MIN: CPT | Performed by: STUDENT IN AN ORGANIZED HEALTH CARE EDUCATION/TRAINING PROGRAM

## 2021-10-05 PROCEDURE — 76816 OB US FOLLOW-UP PER FETUS: CPT | Performed by: OBSTETRICS & GYNECOLOGY

## 2021-10-08 DIAGNOSIS — Z34.02 ENCOUNTER FOR SUPERVISION OF NORMAL FIRST PREGNANCY IN SECOND TRIMESTER: ICD-10-CM

## 2021-10-08 DIAGNOSIS — Z3A.13 13 WEEKS GESTATION OF PREGNANCY: ICD-10-CM

## 2021-10-12 RX ORDER — FERROUS SULFATE TAB EC 324 MG (65 MG FE EQUIVALENT) 324 (65 FE) MG
324 TABLET DELAYED RESPONSE ORAL
Qty: 30 TABLET | Refills: 0 | Status: SHIPPED | OUTPATIENT
Start: 2021-10-12 | End: 2021-10-18 | Stop reason: SDUPTHER

## 2021-10-12 RX ORDER — ASPIRIN 81 MG/1
162 TABLET ORAL DAILY
Qty: 60 TABLET | Refills: 0 | Status: SHIPPED | OUTPATIENT
Start: 2021-10-12 | End: 2022-06-10 | Stop reason: ALTCHOICE

## 2021-10-18 DIAGNOSIS — Z34.02 ENCOUNTER FOR SUPERVISION OF NORMAL FIRST PREGNANCY IN SECOND TRIMESTER: ICD-10-CM

## 2021-10-19 ENCOUNTER — ROUTINE PRENATAL (OUTPATIENT)
Dept: OBGYN CLINIC | Age: 21
End: 2021-10-19
Payer: COMMERCIAL

## 2021-10-19 VITALS — SYSTOLIC BLOOD PRESSURE: 114 MMHG | BODY MASS INDEX: 31.29 KG/M2 | DIASTOLIC BLOOD PRESSURE: 64 MMHG | WEIGHT: 165.6 LBS

## 2021-10-19 DIAGNOSIS — Z34.83 ENCOUNTER FOR SUPERVISION OF OTHER NORMAL PREGNANCY IN THIRD TRIMESTER: Primary | ICD-10-CM

## 2021-10-19 DIAGNOSIS — Z34.03 ENCOUNTER FOR SUPERVISION OF NORMAL FIRST PREGNANCY IN THIRD TRIMESTER: ICD-10-CM

## 2021-10-19 LAB
SL AMB  POCT GLUCOSE, UA: NEGATIVE
SL AMB POCT URINE PROTEIN: NEGATIVE

## 2021-10-19 PROCEDURE — 99213 OFFICE O/P EST LOW 20 MIN: CPT | Performed by: STUDENT IN AN ORGANIZED HEALTH CARE EDUCATION/TRAINING PROGRAM

## 2021-10-19 RX ORDER — FERROUS SULFATE TAB EC 324 MG (65 MG FE EQUIVALENT) 324 (65 FE) MG
324 TABLET DELAYED RESPONSE ORAL
Qty: 30 TABLET | Refills: 0 | Status: SHIPPED | OUTPATIENT
Start: 2021-10-19 | End: 2021-12-05

## 2021-10-22 ENCOUNTER — TELEPHONE (OUTPATIENT)
Dept: OBGYN CLINIC | Facility: CLINIC | Age: 21
End: 2021-10-22

## 2021-11-03 PROBLEM — Z3A.33 33 WEEKS GESTATION OF PREGNANCY: Status: ACTIVE | Noted: 2021-06-24

## 2021-11-04 ENCOUNTER — ROUTINE PRENATAL (OUTPATIENT)
Dept: OBGYN CLINIC | Age: 21
End: 2021-11-04
Payer: COMMERCIAL

## 2021-11-04 VITALS — BODY MASS INDEX: 31.37 KG/M2 | WEIGHT: 166 LBS

## 2021-11-04 DIAGNOSIS — Z71.85 VACCINE COUNSELING: ICD-10-CM

## 2021-11-04 DIAGNOSIS — Z34.83 ENCOUNTER FOR SUPERVISION OF OTHER NORMAL PREGNANCY IN THIRD TRIMESTER: Primary | ICD-10-CM

## 2021-11-04 DIAGNOSIS — J45.20 MILD INTERMITTENT ASTHMA WITHOUT COMPLICATION: ICD-10-CM

## 2021-11-04 DIAGNOSIS — Z86.59 HISTORY OF DEPRESSION: ICD-10-CM

## 2021-11-04 DIAGNOSIS — Z34.03 ENCOUNTER FOR SUPERVISION OF NORMAL FIRST PREGNANCY IN THIRD TRIMESTER: ICD-10-CM

## 2021-11-04 PROCEDURE — 99213 OFFICE O/P EST LOW 20 MIN: CPT | Performed by: STUDENT IN AN ORGANIZED HEALTH CARE EDUCATION/TRAINING PROGRAM

## 2021-11-08 ENCOUNTER — TELEPHONE (OUTPATIENT)
Dept: OBGYN CLINIC | Facility: CLINIC | Age: 21
End: 2021-11-08

## 2021-11-23 ENCOUNTER — TELEPHONE (OUTPATIENT)
Dept: OBGYN CLINIC | Facility: CLINIC | Age: 21
End: 2021-11-23

## 2021-12-05 DIAGNOSIS — Z34.02 ENCOUNTER FOR SUPERVISION OF NORMAL FIRST PREGNANCY IN SECOND TRIMESTER: ICD-10-CM

## 2021-12-05 RX ORDER — FERROUS SULFATE TAB EC 324 MG (65 MG FE EQUIVALENT) 324 (65 FE) MG
324 TABLET DELAYED RESPONSE ORAL
Qty: 30 TABLET | Refills: 0 | Status: SHIPPED | OUTPATIENT
Start: 2021-12-05 | End: 2022-05-11 | Stop reason: SDUPTHER

## 2022-05-11 DIAGNOSIS — Z34.02 ENCOUNTER FOR SUPERVISION OF NORMAL FIRST PREGNANCY IN SECOND TRIMESTER: ICD-10-CM

## 2022-05-11 RX ORDER — FERROUS SULFATE TAB EC 324 MG (65 MG FE EQUIVALENT) 324 (65 FE) MG
324 TABLET DELAYED RESPONSE ORAL
Qty: 30 TABLET | Refills: 0 | Status: SHIPPED | OUTPATIENT
Start: 2022-05-11 | End: 2022-06-10 | Stop reason: SDUPTHER

## 2022-06-10 ENCOUNTER — TELEPHONE (OUTPATIENT)
Dept: INTERNAL MEDICINE CLINIC | Facility: CLINIC | Age: 22
End: 2022-06-10

## 2022-06-10 ENCOUNTER — APPOINTMENT (OUTPATIENT)
Dept: LAB | Facility: CLINIC | Age: 22
End: 2022-06-10
Payer: COMMERCIAL

## 2022-06-10 ENCOUNTER — OFFICE VISIT (OUTPATIENT)
Dept: INTERNAL MEDICINE CLINIC | Facility: CLINIC | Age: 22
End: 2022-06-10
Payer: COMMERCIAL

## 2022-06-10 VITALS
DIASTOLIC BLOOD PRESSURE: 66 MMHG | BODY MASS INDEX: 25.6 KG/M2 | OXYGEN SATURATION: 97 % | HEART RATE: 90 BPM | TEMPERATURE: 97.7 F | RESPIRATION RATE: 17 BRPM | SYSTOLIC BLOOD PRESSURE: 112 MMHG | WEIGHT: 135.6 LBS | HEIGHT: 61 IN

## 2022-06-10 DIAGNOSIS — Z00.00 ANNUAL PHYSICAL EXAM: Primary | ICD-10-CM

## 2022-06-10 DIAGNOSIS — Z11.59 ENCOUNTER FOR HEPATITIS C SCREENING TEST FOR LOW RISK PATIENT: ICD-10-CM

## 2022-06-10 DIAGNOSIS — D50.9 IRON DEFICIENCY ANEMIA, UNSPECIFIED IRON DEFICIENCY ANEMIA TYPE: ICD-10-CM

## 2022-06-10 DIAGNOSIS — Z00.00 ANNUAL PHYSICAL EXAM: ICD-10-CM

## 2022-06-10 DIAGNOSIS — J45.20 MILD INTERMITTENT ASTHMA WITHOUT COMPLICATION: ICD-10-CM

## 2022-06-10 DIAGNOSIS — Z30.41 ENCOUNTER FOR SURVEILLANCE OF CONTRACEPTIVE PILLS: ICD-10-CM

## 2022-06-10 PROBLEM — Z34.02 ENCOUNTER FOR PRENATAL CARE IN SECOND TRIMESTER OF FIRST PREGNANCY: Status: RESOLVED | Noted: 2021-06-17 | Resolved: 2022-06-10

## 2022-06-10 PROBLEM — Z71.85 VACCINE COUNSELING: Status: RESOLVED | Noted: 2021-11-04 | Resolved: 2022-06-10

## 2022-06-10 PROBLEM — Z3A.33 33 WEEKS GESTATION OF PREGNANCY: Status: RESOLVED | Noted: 2021-06-24 | Resolved: 2022-06-10

## 2022-06-10 PROBLEM — Z34.03 ENCOUNTER FOR SUPERVISION OF NORMAL FIRST PREGNANCY IN THIRD TRIMESTER: Status: RESOLVED | Noted: 2021-08-10 | Resolved: 2022-06-10

## 2022-06-10 PROBLEM — O09.892 HIGH RISK TEEN PREGNANCY IN SECOND TRIMESTER: Status: RESOLVED | Noted: 2021-08-09 | Resolved: 2022-06-10

## 2022-06-10 LAB
ANION GAP SERPL CALCULATED.3IONS-SCNC: 4 MMOL/L (ref 4–13)
BUN SERPL-MCNC: 12 MG/DL (ref 5–25)
CALCIUM SERPL-MCNC: 9.5 MG/DL (ref 8.3–10.1)
CHLORIDE SERPL-SCNC: 110 MMOL/L (ref 100–108)
CO2 SERPL-SCNC: 26 MMOL/L (ref 21–32)
CREAT SERPL-MCNC: 0.55 MG/DL (ref 0.6–1.3)
ERYTHROCYTE [DISTWIDTH] IN BLOOD BY AUTOMATED COUNT: 14.1 % (ref 11.6–15.1)
FERRITIN SERPL-MCNC: 8 NG/ML (ref 8–388)
GFR SERPL CREATININE-BSD FRML MDRD: 135 ML/MIN/1.73SQ M
GLUCOSE P FAST SERPL-MCNC: 88 MG/DL (ref 65–99)
HCT VFR BLD AUTO: 35.2 % (ref 34.8–46.1)
HCV AB SER QL: NORMAL
HGB BLD-MCNC: 11.2 G/DL (ref 11.5–15.4)
IRON SATN MFR SERPL: 24 % (ref 15–50)
IRON SERPL-MCNC: 84 UG/DL (ref 50–170)
MCH RBC QN AUTO: 28.9 PG (ref 26.8–34.3)
MCHC RBC AUTO-ENTMCNC: 31.8 G/DL (ref 31.4–37.4)
MCV RBC AUTO: 91 FL (ref 82–98)
PLATELET # BLD AUTO: 227 THOUSANDS/UL (ref 149–390)
PMV BLD AUTO: 12.1 FL (ref 8.9–12.7)
POTASSIUM SERPL-SCNC: 3.7 MMOL/L (ref 3.5–5.3)
RBC # BLD AUTO: 3.88 MILLION/UL (ref 3.81–5.12)
SODIUM SERPL-SCNC: 140 MMOL/L (ref 136–145)
TIBC SERPL-MCNC: 353 UG/DL (ref 250–450)
WBC # BLD AUTO: 5.45 THOUSAND/UL (ref 4.31–10.16)

## 2022-06-10 PROCEDURE — 99395 PREV VISIT EST AGE 18-39: CPT | Performed by: NURSE PRACTITIONER

## 2022-06-10 PROCEDURE — 3725F SCREEN DEPRESSION PERFORMED: CPT | Performed by: NURSE PRACTITIONER

## 2022-06-10 PROCEDURE — 80048 BASIC METABOLIC PNL TOTAL CA: CPT

## 2022-06-10 PROCEDURE — 82728 ASSAY OF FERRITIN: CPT

## 2022-06-10 PROCEDURE — 83550 IRON BINDING TEST: CPT

## 2022-06-10 PROCEDURE — 85027 COMPLETE CBC AUTOMATED: CPT

## 2022-06-10 PROCEDURE — 36415 COLL VENOUS BLD VENIPUNCTURE: CPT

## 2022-06-10 PROCEDURE — 83540 ASSAY OF IRON: CPT

## 2022-06-10 PROCEDURE — 86803 HEPATITIS C AB TEST: CPT

## 2022-06-10 RX ORDER — ACETAMINOPHEN AND CODEINE PHOSPHATE 120; 12 MG/5ML; MG/5ML
1 SOLUTION ORAL DAILY
COMMUNITY

## 2022-06-10 RX ORDER — FERROUS SULFATE TAB EC 324 MG (65 MG FE EQUIVALENT) 324 (65 FE) MG
324 TABLET DELAYED RESPONSE ORAL
Qty: 30 TABLET | Refills: 0 | Status: SHIPPED | OUTPATIENT
Start: 2022-06-10 | End: 2022-07-07

## 2022-06-10 RX ORDER — ALBUTEROL SULFATE 90 UG/1
2 AEROSOL, METERED RESPIRATORY (INHALATION) EVERY 6 HOURS PRN
Qty: 6.7 G | Refills: 1 | Status: SHIPPED | OUTPATIENT
Start: 2022-06-10

## 2022-06-10 NOTE — PROGRESS NOTES
ADULT ANNUAL PHYSICAL  [address]    NAME: Markie Shelley  AGE: 22 y o  SEX: female  : 2000     DATE: 6/10/2022     Assessment and Plan:     Problem List Items Addressed This Visit        Respiratory    Mild intermittent asthma without complication       Well controlled with rescue inhaler  Relevant Medications    albuterol (PROVENTIL HFA,VENTOLIN HFA) 90 mcg/act inhaler       Other    Iron deficiency anemia       Patient with history of iron deficiency anemia  Blood work has been ordered  The patient continues on her oral iron supplementation  Relevant Medications    ferrous sulfate 324 (65 Fe) mg    Other Relevant Orders    CBC and Platelet    Iron Panel (Includes Ferritin, Iron Sat%, Iron, and TIBC)    BMI 25 0-25 9,adult       The patient's BMI in the office today is 25 62  Patient does not follow a healthy diet  She has been walking more frequently  Lifestyle modifications discussed  Other Visit Diagnoses     Annual physical exam    -  Primary    Relevant Orders    CBC and Platelet    Basic metabolic panel    Encounter for surveillance of contraceptive pills        Relevant Orders    Ambulatory Referral to Gynecology    Encounter for hepatitis C screening test for low risk patient        Relevant Orders    Hepatitis C antibody          Immunizations and preventive care screenings were discussed with patient today  Appropriate education was printed on patient's after visit summary  Counseling:  Alcohol/drug use: discussed moderation in alcohol intake, the recommendations for healthy alcohol use, and avoidance of illicit drug use  Dental Health: discussed importance of regular tooth brushing, flossing, and dental visits  Injury prevention: discussed safety/seat belts, safety helmets, smoke detectors, carbon dioxide detectors, and smoking near bedding or upholstery    Sexual health: discussed sexually transmitted diseases, partner selection, use of condoms, avoidance of unintended pregnancy, and contraceptive alternatives  Exercise: the importance of regular exercise/physical activity was discussed  Recommend exercise 3-5 times per week for at least 30 minutes  No follow-ups on file  Chief Complaint:     Chief Complaint   Patient presents with    Follow-up    Annual Exam     Needs referral to Gynocologist      History of Present Illness:     Adult Annual Physical   Patient here for a comprehensive physical exam  The patient reports no problems  Diet and Physical Activity  Diet/Nutrition: limited junk food, high fat diet and limited fruits/vegetables  Exercise: no formal exercise  Depression Screening  PHQ-2/9 Depression Screening    Little interest or pleasure in doing things: 0 - not at all  Feeling down, depressed, or hopeless: 0 - not at all  PHQ-2 Score: 0  PHQ-2 Interpretation: Negative depression screen       General Health  Sleep: sleeps well and gets more than 8 hours of sleep on average  Hearing: normal - bilateral   Vision: goes for regular eye exams, most recent eye exam <1 year ago and wears glasses  Dental: no dental visits for >1 year and brushes teeth twice daily  /GYN Health  Last menstrual period: 5/25/2022  Contraceptive method: oral contraceptives  History of STDs?: no      Review of Systems:     Review of Systems   Past Medical History:     Past Medical History:   Diagnosis Date    Asthma     albuterol prn    Depression     intermittent   no meds    Varicella     had vaccines      Past Surgical History:     History reviewed  No pertinent surgical history     Social History:     Social History     Socioeconomic History    Marital status: Single     Spouse name: None    Number of children: None    Years of education: None    Highest education level: None   Occupational History    None   Tobacco Use    Smoking status: Never Smoker    Smokeless tobacco: Never Used   Vaping Use    Vaping Use: Never used   Substance and Sexual Activity    Alcohol use: Yes     Comment: occasionally    Drug use: Not Currently    Sexual activity: Yes     Partners: Male     Birth control/protection: OCP   Other Topics Concern    None   Social History Narrative    None     Social Determinants of Health     Financial Resource Strain: Not on file   Food Insecurity: Not on file   Transportation Needs: Not on file   Physical Activity: Not on file   Stress: No Stress Concern Present    Feeling of Stress : Not at all   Social Connections: Not on file   Intimate Partner Violence: Not on file   Housing Stability: Not on file      Family History:     Family History   Problem Relation Age of Onset    Asthma Mother     No Known Problems Father     No Known Problems Sister     No Known Problems Brother     Asthma Maternal Grandmother     Hypertension Maternal Grandmother     No Known Problems Maternal Grandfather     Diabetes Paternal Grandmother     No Known Problems Paternal Grandfather     Ovarian cancer Neg Hx     Breast cancer Neg Hx     Cervical cancer Neg Hx     Uterine cancer Neg Hx       Current Medications:     Current Outpatient Medications   Medication Sig Dispense Refill    albuterol (PROVENTIL HFA,VENTOLIN HFA) 90 mcg/act inhaler Inhale 2 puffs every 6 (six) hours as needed for wheezing 6 7 g 1    ferrous sulfate 324 (65 Fe) mg Take 1 tablet (324 mg total) by mouth daily before breakfast 30 tablet 0    loratadine (CLARITIN) 10 mg tablet Take 10 mg by mouth daily       norethindrone (MICRONOR) 0 35 MG tablet Take 1 tablet by mouth daily      Prenatal Vit-Fe Fumarate-FA (Prenatal Vitamin) 27-0 8 MG TABS Take by mouth       No current facility-administered medications for this visit        Allergies:     No Known Allergies   Physical Exam:     /66 (BP Location: Left arm, Patient Position: Sitting, Cuff Size: Standard)   Pulse 90   Temp 97 7 °F (36 5 °C) (Temporal) Comment: no nsaids  Resp 17   Ht 5' 1" (1 549 m)   Wt 61 5 kg (135 lb 9 6 oz)   SpO2 97%   BMI 25 62 kg/m²     Physical Exam     Toney Tate, [address] [facility]

## 2022-06-10 NOTE — ASSESSMENT & PLAN NOTE
The patient's BMI in the office today is 25 62  Patient does not follow a healthy diet  She has been walking more frequently  Lifestyle modifications discussed

## 2022-06-10 NOTE — TELEPHONE ENCOUNTER
----- Message from Margarita Teixeira, [address] sent at 6/10/2022  3:57 PM EDT -----    Please call patient and make her aware that her blood work is normal

## 2022-06-10 NOTE — PATIENT INSTRUCTIONS

## 2022-06-10 NOTE — ASSESSMENT & PLAN NOTE
Patient with history of iron deficiency anemia  Blood work has been ordered  The patient continues on her oral iron supplementation

## 2022-07-07 DIAGNOSIS — D50.9 IRON DEFICIENCY ANEMIA, UNSPECIFIED IRON DEFICIENCY ANEMIA TYPE: ICD-10-CM

## 2022-07-07 RX ORDER — FERROUS SULFATE TAB EC 324 MG (65 MG FE EQUIVALENT) 324 (65 FE) MG
TABLET DELAYED RESPONSE ORAL
Qty: 30 TABLET | Refills: 0 | Status: SHIPPED | OUTPATIENT
Start: 2022-07-07

## 2022-10-07 ENCOUNTER — OFFICE VISIT (OUTPATIENT)
Dept: OBGYN CLINIC | Facility: CLINIC | Age: 22
End: 2022-10-07
Payer: OTHER GOVERNMENT

## 2022-10-07 VITALS
HEIGHT: 61 IN | BODY MASS INDEX: 26.62 KG/M2 | DIASTOLIC BLOOD PRESSURE: 70 MMHG | WEIGHT: 141 LBS | SYSTOLIC BLOOD PRESSURE: 110 MMHG

## 2022-10-07 DIAGNOSIS — Z32.02 NEGATIVE PREGNANCY TEST: ICD-10-CM

## 2022-10-07 DIAGNOSIS — N93.9 ABNORMAL UTERINE BLEEDING (AUB): Primary | ICD-10-CM

## 2022-10-07 DIAGNOSIS — Z30.09 ENCOUNTER FOR OTHER GENERAL COUNSELING AND ADVICE ON CONTRACEPTION: ICD-10-CM

## 2022-10-07 PROCEDURE — 87591 N.GONORRHOEAE DNA AMP PROB: CPT | Performed by: OBSTETRICS & GYNECOLOGY

## 2022-10-07 PROCEDURE — 87491 CHLMYD TRACH DNA AMP PROBE: CPT | Performed by: OBSTETRICS & GYNECOLOGY

## 2022-10-07 PROCEDURE — 81025 URINE PREGNANCY TEST: CPT | Performed by: OBSTETRICS & GYNECOLOGY

## 2022-10-07 PROCEDURE — 99214 OFFICE O/P EST MOD 30 MIN: CPT | Performed by: OBSTETRICS & GYNECOLOGY

## 2022-10-07 NOTE — PROGRESS NOTES
Assessment/Plan:    No problem-specific Assessment & Plan notes found for this encounter  Diagnoses and all orders for this visit:    Abnormal uterine bleeding (AUB)  -     US pelvis complete w transvaginal; Future    Encounter for other general counseling and advice on contraception        We discussed bleeding patterns post termination  Advised to continue to monitor bleeding patterns as this could be her body trying to reregulate after a short interval pregnancy  May use up Tylenol or ibuprofen if cramping were to continue    We discussed Mirena and Josephine Jolly IUDs as options for birth control  She would like 1 placed after the results of her ultrasound come back  Call to schedule ultrasound  Patient does have birth control pills at home, advised to start them today as a backup until IUD is placed  Advised use of condoms with every sexual encounter  Subjective:      Patient ID: Balwinder Ibarra is a 22 y o  female  21 yo female  presents for bleeding since  post termination of pregnancy, she was 7 weeks pregnant at that time ( this was a short interval pregnancy, she delivered her daughter appro 7 months prior in [facility])   She was not ready for another child  She reports on and off bleeding during the entire 7 weeks, this week  to Tuesday there was no bleeding but heavy cramping 8/10 pain  Took Ibuprofen which helped the pain, 6/10  Today she has no cramping, but notes mild bleeding/ spotting  No pelvic pain, unusual vaginal odor or discharge  No fevers  She would like an IUD for [facility],   No new partner changes but consents to [facility] & CT testing today          The following portions of the patient's history were reviewed and updated as appropriate: allergies, current medications, past family history, past medical history, past social history, past surgical history and problem list     Review of Systems   Constitutional: Negative for chills, fatigue and fever     Eyes: Negative for visual disturbance  Respiratory: Negative for cough and shortness of breath  Cardiovascular: Negative for chest pain  Gastrointestinal: Negative for abdominal pain  Genitourinary: Positive for menstrual problem and vaginal bleeding  Negative for vaginal discharge  Objective:      /70 (BP Location: Left arm, Patient Position: Sitting, Cuff Size: Large)   Ht 5' 1" (1 549 m)   Wt 64 kg (141 lb)   BMI 26 64 kg/m²          Physical Exam  Vitals and nursing note reviewed  Constitutional:       Appearance: Normal appearance  She is normal weight  HENT:      Head: Normocephalic and atraumatic  Eyes:      Conjunctiva/sclera: Conjunctivae normal    Cardiovascular:      Rate and Rhythm: Normal rate  Pulmonary:      Effort: Pulmonary effort is normal    Genitourinary:     General: Normal vulva  Exam position: Lithotomy position  Estrada stage (genital): 5  Labia:         Right: No rash, tenderness, lesion or injury  Left: No rash, tenderness, lesion or injury  Vagina: Bleeding present  Cervix: Normal       Uterus: Normal        Adnexa: Right adnexa normal and left adnexa normal       Comments: Brownish colored bleeding noted   Musculoskeletal:         General: Normal range of motion  Cervical back: Normal range of motion  Lymphadenopathy:      Lower Body: No right inguinal adenopathy  No left inguinal adenopathy  Skin:     General: Skin is warm and dry  Neurological:      Mental Status: She is alert  Psychiatric:         Mood and Affect: Mood normal          Behavior: Behavior normal          Thought Content:  Thought content normal          Judgment: Judgment normal

## 2022-10-10 LAB
C TRACH DNA SPEC QL NAA+PROBE: NEGATIVE
N GONORRHOEA DNA SPEC QL NAA+PROBE: NEGATIVE

## 2022-10-11 LAB — SL AMB POCT URINE HCG: NEGATIVE

## 2022-10-20 ENCOUNTER — TELEPHONE (OUTPATIENT)
Dept: OBGYN CLINIC | Facility: CLINIC | Age: 22
End: 2022-10-20

## 2022-10-20 NOTE — TELEPHONE ENCOUNTER
Cyngunnar Maynards from [address] [place] [address] called in regards to the pts ultrasound that is scheduled for 11/7  She spoke to [employer] a rep named Yoli Henriquez  Her insurance will be termed on 11/2   She will need prior auth for 2 cpt codes : [id number]     Please advise

## 2022-10-21 ENCOUNTER — TELEPHONE (OUTPATIENT)
Dept: OBGYN CLINIC | Facility: CLINIC | Age: 22
End: 2022-10-21

## 2022-10-27 NOTE — TELEPHONE ENCOUNTER
Dwaine Chavez from [address] [place] [address] called back to check on the prior auth  For [place]

## 2022-11-14 ENCOUNTER — TELEPHONE (OUTPATIENT)
Dept: LABOR AND DELIVERY | Facility: HOSPITAL | Age: 22
End: 2022-11-14

## 2022-11-14 ENCOUNTER — HOSPITAL ENCOUNTER (OUTPATIENT)
Dept: ULTRASOUND IMAGING | Facility: CLINIC | Age: 22
Discharge: HOME | End: 2022-11-14

## 2022-11-14 DIAGNOSIS — N93.9 ABNORMAL UTERINE BLEEDING (AUB): ICD-10-CM

## 2022-11-14 NOTE — TELEPHONE ENCOUNTER
Received TT from [employer] regarding US results to review  US shows possible retained tissue from recent termination  Patient should be seen in the office by MD as soon as able this week to discuss findings and management  If bleeding becomes heavy should present to ER emergently

## 2022-11-14 NOTE — TELEPHONE ENCOUNTER
Discussed results with patient  No availability in [employer] offices, patient can not travel to [facility] office  Will reach out to scheduling for double booking

## 2022-11-15 ENCOUNTER — OFFICE VISIT (OUTPATIENT)
Dept: OBGYN CLINIC | Facility: CLINIC | Age: 22
End: 2022-11-15

## 2022-11-15 VITALS
BODY MASS INDEX: 26.96 KG/M2 | DIASTOLIC BLOOD PRESSURE: 70 MMHG | HEIGHT: 61 IN | SYSTOLIC BLOOD PRESSURE: 110 MMHG | WEIGHT: 142.8 LBS

## 2022-11-15 DIAGNOSIS — O03.4 RETAINED PRODUCTS OF CONCEPTION FOLLOWING ABORTION: Primary | ICD-10-CM

## 2022-11-15 NOTE — PROGRESS NOTES
Assessment/Plan:   Retained products of conception following   Patient with 4 months of AUB following medical termination  US  with concern for retained POC  Discussed medical therapy is not likely to resolve products in place for that period of time, discussed risks for hemorrhage/sepsis with expectant management and discussed recommendation for [facility]  for diagnosis and treatment of suspected retained POC  After reviewing options for expectant management, medical management and surgical management the patient elected recommended surgical procedure  We discussed previously the alternatives as well as the benefits of each treatment option  We reviewed the plan for exam under anesthesia, dilation and curettage  We discussed the risks of this surgery include bleeding, infection and injury  The patient agrees if life threatening blood loss were to occur she would accept a blood transfusion  The risk for infection in this surgery is such that she will require pre operative antibiotics for prophylaxis  For this procedure the risks of injury include perforation, hysterectomy, scarring/infertility, need for additional surgery  Consent signed  Declines IUD placement at the same time  There are no diagnoses linked to this encounter  Subjective:     Patient ID: Jeramie Mcneal is a 22 y o  female  23 yo here for follow up after US  She reports since her appt with kurt her bleeding stopped around 10/31 and has not returned  She is aware of concern for retained tissue and possible need for surgery  Review of Systems   Constitutional: Negative for chills and fever  HENT: Negative for ear pain and sore throat  Eyes: Negative for pain and visual disturbance  Respiratory: Negative for cough and shortness of breath  Cardiovascular: Negative for chest pain and palpitations  Gastrointestinal: Negative for abdominal pain, constipation, diarrhea, nausea and vomiting     Genitourinary: Negative for dyspareunia, dysuria, frequency, hematuria, urgency, vaginal bleeding, vaginal discharge and vaginal pain  Musculoskeletal: Negative for arthralgias and back pain  Skin: Negative for color change and rash  Neurological: Negative for seizures and syncope  All other systems reviewed and are negative  Objective:     Physical Exam  Vitals reviewed  Constitutional:       General: She is not in acute distress  Appearance: She is well-developed  She is not diaphoretic  HENT:      Head: Normocephalic and atraumatic  Pulmonary:      Effort: Pulmonary effort is normal  No respiratory distress  Genitourinary:     Labia:         Right: No rash, tenderness, lesion or injury  Left: No rash, tenderness, lesion or injury  Vagina: No vaginal discharge, erythema, tenderness or bleeding  Cervix: No friability, lesion, erythema or cervical bleeding  Uterus: Normal  Not enlarged and not tender  Adnexa: Right adnexa normal and left adnexa normal         Right: No mass, tenderness or fullness  Left: No mass, tenderness or fullness  Musculoskeletal:      Cervical back: Normal range of motion  Neurological:      Mental Status: She is alert and oriented to person, place, and time  Psychiatric:         Behavior: Behavior normal          Thought Content:  Thought content normal          Judgment: Judgment normal

## 2022-11-15 NOTE — TELEPHONE ENCOUNTER
Lm for pt that chuck will see her today at 12:00 in [facility]  Also sent note in her mychart

## 2022-11-15 NOTE — ASSESSMENT & PLAN NOTE
Patient with 4 months of AUB following medical termination   11/14 with concern for retained POC  Discussed medical therapy is not likely to resolve products in place for that period of time, discussed risks for hemorrhage/sepsis with expectant management and discussed recommendation for [facility]  for diagnosis and treatment of suspected retained POC  After reviewing options for expectant management, medical management and surgical management the patient elected recommended surgical procedure  We discussed previously the alternatives as well as the benefits of each treatment option  We reviewed the plan for exam under anesthesia, dilation and curettage  We discussed the risks of this surgery include bleeding, infection and injury  The patient agrees if life threatening blood loss were to occur she would accept a blood transfusion  The risk for infection in this surgery is such that she will require pre operative antibiotics for prophylaxis  For this procedure the risks of injury include perforation, hysterectomy, scarring/infertility, need for additional surgery  Consent signed

## 2022-11-17 ENCOUNTER — TELEPHONE (OUTPATIENT)
Dept: OBGYN CLINIC | Facility: CLINIC | Age: 22
End: 2022-11-17

## 2022-11-17 ENCOUNTER — HOSPITAL ENCOUNTER (OUTPATIENT)
Facility: AMBULARY SURGERY CENTER | Age: 22
Setting detail: OUTPATIENT SURGERY
End: 2022-11-17
Attending: OBSTETRICS & GYNECOLOGY | Admitting: OBSTETRICS & GYNECOLOGY

## 2022-11-17 NOTE — TELEPHONE ENCOUNTER
Talked to patient yesterday she would like the [facility] OR to have her procedure done she can not do surgery date on 11/18/22 with Dr Hamilton Espitia at [facility]  Patient selected procedure to be done on 12/16/2022 with Dr Riley June  I left message for patient to call back as I can get her into the [facility] OR tomorrow 11/18/22 with DR Wakefield for her surgical procedure to be done

## 2022-12-05 NOTE — PROGRESS NOTES
Assessment:  1  Acute exacerbation of chronic low back pain    2  Chronic bilateral low back pain, unspecified whether sciatica present    3  Strain of lumbar region, initial encounter        Plan:  Orders Placed This Encounter   Procedures   • Durable Medical Equipment     1 TENS UNIT   • Ambulatory referral to Physical Therapy     Standing Status:   Future     Standing Expiration Date:   12/7/2023     Referral Priority:   Routine     Referral Type:   Physical Therapy     Referral Reason:   Specialty Services Required     Requested Specialty:   Physical Therapy     Number of Visits Requested:   1     Expiration Date:   12/7/2023       New Medications Ordered This Visit   Medications   • diclofenac (VOLTAREN) 75 mg EC tablet     Sig: Take 1 tablet (75 mg total) by mouth 2 (two) times a day as needed (moderate pain)     Dispense:  30 tablet     Refill:  0       My impressions and treatment recommendations were discussed in detail with the patient, who verbalized understanding and had no further questions  73-year-old female presents to the office chief complaint of intermittent low back pain for the last 1 year but worsening over the last week  Pain is notably flexion-based  She has full range of motion without escalation of pain with lumbar extension, rotation, lateral flexion  Likely symptomatic from lumbar strain  Given the myofascial source of her symptoms, will order TENS unit  She also needs to begin physical therapy for core strengthening  Lastly, will order diclofenac 75 mg twice daily as needed  She will take the medication consistently with food and water for the first 10 days and as needed thereafter  I confirmed with the patient that she is not pregnant  [facility] Prescription Drug Monitoring Program report was reviewed and was appropriate     Complete risks and benefits including bleeding, infection, tissue reaction, nerve injury and allergic reaction were discussed   The approach was demonstrated using models and literature was provided  Verbal and written consent was obtained  Follow-up is planned in 6w time or sooner as warranted  Discharge instructions were provided  I personally saw and examined the patient and I agree with the above discussed plan of care  History of Present Illness:    Pb Luna is a 25 y o  female who presents to [facility] for initial evaluation of the above stated pain complaints  The patient has a past medical and chronic pain history as outlined in the assessment section  He was referred by No referring provider defined for this encounter  Chief complaint low back pain  Reports on and off back pain since having a baby without an epidural last year  However reports that since 5 days, she has had moderate to severe pain  She is currently a student  Has been intermittent since December 2021  Next  Over the past 1, intensity has been moderate to severe  Currently 8 out of 10  Nearly constant  Worse in the morning and nighttime  Described as sharp, pins-and-needles, pressure-like, dull/aching  Does not ambulate with an assistive device  Denies bowel bladder incontinence or saddle anesthesia  Increased with bending, sitting, exercise, coughing, sneezing  No change relaxation, bowel movement  No change with lying down or walking  No recent pertinent imaging  Past medical history includes asthma  Next  Does not use tobacco or marijuana  Drinks alcohol occasionally  Not on blood thinners  Not allergic to latex or contrast dye  Review of Systems:    Review of Systems        Past Medical History:   Diagnosis Date   • Asthma     albuterol prn   • Depression     intermittent   no meds   • Varicella     had vaccines       History reviewed  No pertinent surgical history      Family History   Problem Relation Age of Onset   • Asthma Mother    • No Known Problems Father    • No Known Problems Sister    • No Known Problems Brother    • Asthma Maternal Grandmother    • Hypertension Maternal Grandmother    • No Known Problems Maternal Grandfather    • Diabetes Paternal Grandmother    • No Known Problems Paternal Grandfather    • Ovarian cancer Neg Hx    • Breast cancer Neg Hx    • Cervical cancer Neg Hx    • Uterine cancer Neg Hx        Social History     Occupational History   • Not on file   Tobacco Use   • Smoking status: Never   • Smokeless tobacco: Never   Vaping Use   • Vaping Use: Never used   Substance and Sexual Activity   • Alcohol use: Yes     Comment: occasionally   • Drug use: Not Currently   • Sexual activity: Yes     Partners: Male         Current Outpatient Medications:   •  albuterol (PROVENTIL HFA,VENTOLIN HFA) 90 mcg/act inhaler, Inhale 2 puffs every 6 (six) hours as needed for wheezing, Disp: 6 7 g, Rfl: 1  •  diclofenac (VOLTAREN) 75 mg EC tablet, Take 1 tablet (75 mg total) by mouth 2 (two) times a day as needed (moderate pain), Disp: 30 tablet, Rfl: 0  •  ferrous sulfate 324 (65 Fe) mg, TAKE 1 TABLET BY MOUTH DAILY BEFORE BREAKFAST, Disp: 30 tablet, Rfl: 0  •  loratadine (CLARITIN) 10 mg tablet, Take 10 mg by mouth daily , Disp: , Rfl:   •  norethindrone (MICRONOR) 0 35 MG tablet, Take 1 tablet by mouth daily (Patient not taking: Reported on 11/15/2022), Disp: , Rfl:   •  Prenatal Vit-Fe Fumarate-FA (Prenatal Vitamin) 27-0 8 MG TABS, Take by mouth (Patient not taking: Reported on 10/7/2022), Disp: , Rfl:     No Known Allergies    Physical Exam:    BP 95/62 (BP Location: Right arm, Patient Position: Sitting, Cuff Size: Standard)   Pulse 73     Constitutional: normal, well developed, well nourished, alert, in no distress and non-toxic and no overt pain behavior    Eyes: anicteric  HEENT: grossly intact  Neck: supple, symmetric, trachea midline and no masses   Pulmonary:even and unlabored  Cardiovascular:No edema or pitting edema present  Skin:Normal without rashes or lesions and well hydrated  Psychiatric:Mood and affect appropriate  Neurologic:Cranial Nerves II-XII grossly intact  Musculoskeletal:normal     Lumbar Spine Exam    Appearance:  Normal lordosis  Palpation/Tenderness:  left lumbar paraspinal tenderness  right lumbar paraspinal tenderness  Sensory:  no sensory deficits noted  Range of Motion:  Flexion:  Severely limited  with pain  Extension:  No limitation  without pain  Lateral Flexion - Left:  No limitation  without pain  Lateral Flexion - Right:  No limitation  without pain  Rotation - Left:  No limitation  without pain  Rotation - Right:  No limitation  without pain  Motor Strength:  Left hip flexion:  5/5  Left hip extension:  5/5  Right hip flexion:  5/5  Right hip extension:  5/5  Left knee flexion:  5/5  Left knee extension:  5/5  Right knee flexion:  5/5  Right knee extension:  5/5  Left foot dorsiflexion:  5/5  Left foot plantar flexion:  5/5  Right foot dorsiflexion:  5/5  Right foot plantar flexion:  5/5  Reflexes:  Left Patellar:  2+   Right Patellar:  2+   Left Achilles:  2+   Right Achilles:  2+   Special Tests:  Left Straight Leg Test:  negative  Right Straight Leg Test:  negative  Left Jeff's Maneuver:  negative  Right Jeff's Maneuver:  negative      Imaging  No orders to display       Orders Placed This Encounter   Procedures   • Durable Medical Equipment   • Ambulatory referral to Physical Therapy

## 2022-12-07 ENCOUNTER — CONSULT (OUTPATIENT)
Dept: PAIN MEDICINE | Facility: CLINIC | Age: 22
End: 2022-12-07

## 2022-12-07 ENCOUNTER — DOCUMENTATION (OUTPATIENT)
Dept: PAIN MEDICINE | Facility: CLINIC | Age: 22
End: 2022-12-07

## 2022-12-07 ENCOUNTER — TELEPHONE (OUTPATIENT)
Dept: OBGYN CLINIC | Facility: CLINIC | Age: 22
End: 2022-12-07

## 2022-12-07 ENCOUNTER — APPOINTMENT (OUTPATIENT)
Dept: LAB | Facility: CLINIC | Age: 22
End: 2022-12-07

## 2022-12-07 ENCOUNTER — TELEPHONE (OUTPATIENT)
Dept: PAIN MEDICINE | Facility: CLINIC | Age: 22
End: 2022-12-07

## 2022-12-07 VITALS — SYSTOLIC BLOOD PRESSURE: 95 MMHG | DIASTOLIC BLOOD PRESSURE: 62 MMHG | HEART RATE: 73 BPM

## 2022-12-07 DIAGNOSIS — M54.50 ACUTE EXACERBATION OF CHRONIC LOW BACK PAIN: Primary | ICD-10-CM

## 2022-12-07 DIAGNOSIS — M54.50 CHRONIC BILATERAL LOW BACK PAIN, UNSPECIFIED WHETHER SCIATICA PRESENT: ICD-10-CM

## 2022-12-07 DIAGNOSIS — G89.29 ACUTE EXACERBATION OF CHRONIC LOW BACK PAIN: Primary | ICD-10-CM

## 2022-12-07 DIAGNOSIS — O03.4 RETAINED PRODUCTS OF CONCEPTION FOLLOWING ABORTION: ICD-10-CM

## 2022-12-07 DIAGNOSIS — G89.29 CHRONIC BILATERAL LOW BACK PAIN, UNSPECIFIED WHETHER SCIATICA PRESENT: ICD-10-CM

## 2022-12-07 DIAGNOSIS — S39.012A STRAIN OF LUMBAR REGION, INITIAL ENCOUNTER: ICD-10-CM

## 2022-12-07 RX ORDER — DICLOFENAC SODIUM 75 MG/1
75 TABLET, DELAYED RELEASE ORAL 2 TIMES DAILY PRN
Qty: 30 TABLET | Refills: 0 | Status: SHIPPED | OUTPATIENT
Start: 2022-12-07

## 2022-12-07 NOTE — TELEPHONE ENCOUNTER
Spoke to pt she stated she had a medical AB back in July- no f/u appt with them- had some AUB and came to see us  We did imaging- retained POC poss    no bleeding since Oct   10/11 - UPT was neg    UPT + today and has preg sx-told pt I will relay to KTM as she has upcoming surgery with her to see if she wants to order any labs or another US to be done

## 2022-12-07 NOTE — TELEPHONE ENCOUNTER
Lm to pt and relayed plan from KTM-  Serial HCG's and US- all orders placed  advised pt to got for HCG Thurs and Sat-  I will order the US but dependent on the HCg levels not be able to see anything- hold off on scheduling until after 1st result

## 2022-12-07 NOTE — TELEPHONE ENCOUNTER
Caller: pt    Doctor: Sherry Felix     Reason for call: Pt is calling in stating that she just find out that she is pregnant today   Is she still able to take diclofenac (VOLTAREN) 75 mg         Call back#: [phone]

## 2022-12-07 NOTE — TELEPHONE ENCOUNTER
Pt is scheduled 12/16 for surgery with Dr Viktoriya Pradhan next week and she just got a positive pregnancy test She told me the surgery is to remove tissue from a previous vip

## 2022-12-07 NOTE — PATIENT INSTRUCTIONS
How to Use a TENS Unit   WHAT YOU NEED TO KNOW:   What do I need to know about a TENS unit? A transcutaneous electrical nerve stimulation (TENS) unit is a treatment for pain  A TENS unit is a small, portable, battery-powered device  The TENS unit uses mild, safe electrical signals to help control pain  Electrodes (sticky patches) are placed on your skin  The TENS unit sends painless electrical signals through the electrodes to the nerves under your skin  Electrode placement depends on the type and location of your pain  Your healthcare provider will show you where to place the electrodes and what settings are best for you  How do I use a TENS unit? Test the battery pack of the TENS unit to make sure it is fully charged  The TENS unit has 2 control knobs  One control knob makes the electrical signals strong or weak  The other control knob makes the electrical signals fast or slow  Turn the control knobs to the off position before you start  Use rubbing alcohol to clean the skin where the electrodes will be placed  Let your skin dry  Put a thin coat of gel on the bottom of each electrode  This gel helps the electrical signal get to the nerves under your skin  Put the electrodes on your skin and use medical tape or a sticky patch to cover the electrode  This keeps the electrode firmly stuck to the skin  Ask for help if you cannot reach the area where the electrodes should go  Hook the pin connectors on the end of the electrode wires to the electrodes  Then plug the electrode wires into the TENS unit  Turn the control knobs slowly to the correct setting  You should feel a tingling feeling  Hook the TENS unit to your belt or place it in a pocket  What should I do after the TENS treatment? Turn the control knobs to the off position  Unplug the electrode wires from the TENS unit  The electrodes may be left on your skin if you have another TENS treatment soon  If not, remove the electrodes  Wash the skin where the electrodes were placed  Clean the electrodes with soap and water to remove the gel  Do not use alcohol because this can damage the rubber on the electrode  Get new electrodes if the electrodes become damaged or will not stay stuck to the skin  Remove the battery from the TENS and replace it with a charged battery  Charge the battery so that it will be ready for another treatment  What else do I need to know about a TENS unit? Tell your healthcare provider if your muscles start to twitch during treatment  The TENS signals may be too strong or too fast  Also tell him if you cannot feel any tingling at all  The signal may be too weak or too slow  The electrodes should be removed at least once a day if the TENS treatment is used around the clock  Check your skin under the electrodes for redness or tenderness  Clean your skin when the electrodes are off and use lotion  Move the electrodes slightly for each treatment  This will help prevent the skin from becoming red or sore  Put new gel on the bottom of the electrodes each time you place them on your skin  Do not sleep or get near water with the electrodes on your skin and the TENS unit turned on  CARE AGREEMENT:   You have the right to help plan your care  Learn about your health condition and how it may be treated  Discuss treatment options with your healthcare providers to decide what care you want to receive  You always have the right to refuse treatment  The above information is an  only  It is not intended as medical advice for individual conditions or treatments  Talk to your doctor, nurse or pharmacist before following any medical regimen to see if it is safe and effective for you  © Copyright [employer] 2022 Information is for End User's use only and may not be sold, redistributed or otherwise used for commercial purposes   All illustrations and images included in CareNotes® are the copyrighted property of A D A M , Inc  or [address] [place] [address]

## 2022-12-08 ENCOUNTER — TELEPHONE (OUTPATIENT)
Dept: OBGYN CLINIC | Facility: CLINIC | Age: 22
End: 2022-12-08

## 2022-12-08 DIAGNOSIS — O03.4 RETAINED PRODUCTS OF CONCEPTION FOLLOWING ABORTION: Primary | ICD-10-CM

## 2022-12-08 LAB — B-HCG SERPL-ACNC: ABNORMAL MIU/ML

## 2022-12-08 NOTE — TELEPHONE ENCOUNTER
Per comm consent lm to pt with results and recommendations of HCG from KTM  Can froylan US- will place order- level high enough

## 2022-12-08 NOTE — TELEPHONE ENCOUNTER
----- Message from David Kraft MD sent at 12/8/2022  9:33 AM EST -----  Please order a ultrasound   This quant is high enough to do this

## 2022-12-09 ENCOUNTER — HOSPITAL ENCOUNTER (OUTPATIENT)
Dept: ULTRASOUND IMAGING | Facility: CLINIC | Age: 22
Discharge: HOME | End: 2022-12-09

## 2022-12-09 ENCOUNTER — TELEPHONE (OUTPATIENT)
Dept: OBGYN CLINIC | Facility: CLINIC | Age: 22
End: 2022-12-09

## 2022-12-09 DIAGNOSIS — O03.4 RETAINED PRODUCTS OF CONCEPTION FOLLOWING ABORTION: ICD-10-CM

## 2022-12-09 NOTE — TELEPHONE ENCOUNTER
ANNY cell phone for patient to call back with new insurance info,  insurance we have on file  on 12/3/2022

## 2022-12-12 ENCOUNTER — TELEPHONE (OUTPATIENT)
Dept: OBGYN CLINIC | Facility: CLINIC | Age: 22
End: 2022-12-12

## 2022-12-12 NOTE — TELEPHONE ENCOUNTER
Per comm consent lm to pt with results and recommendations of US from [address]  Needs office US in 1-2 wks for new preg

## 2022-12-12 NOTE — TELEPHONE ENCOUNTER
----- Message from Judd Brandon MD sent at 12/9/2022  6:03 PM EST -----  New diagnosis of pregnancy  Will nee office appointment 1-2 weeks follow up imaging

## 2022-12-27 ENCOUNTER — APPOINTMENT (EMERGENCY)
Dept: CT IMAGING | Facility: HOSPITAL | Age: 22
End: 2022-12-27

## 2022-12-27 ENCOUNTER — HOSPITAL ENCOUNTER (EMERGENCY)
Facility: HOSPITAL | Age: 22
Discharge: HOME | End: 2022-12-27
Attending: EMERGENCY MEDICINE

## 2022-12-27 ENCOUNTER — APPOINTMENT (EMERGENCY)
Dept: RADIOLOGY | Facility: HOSPITAL | Age: 22
End: 2022-12-27

## 2022-12-27 VITALS
HEART RATE: 83 BPM | SYSTOLIC BLOOD PRESSURE: 112 MMHG | OXYGEN SATURATION: 100 % | DIASTOLIC BLOOD PRESSURE: 57 MMHG | TEMPERATURE: 97.5 F | RESPIRATION RATE: 16 BRPM

## 2022-12-27 DIAGNOSIS — R07.9 CHEST PAIN: Primary | ICD-10-CM

## 2022-12-27 LAB
ALBUMIN SERPL BCP-MCNC: 3.7 G/DL (ref 3.5–5)
ALP SERPL-CCNC: 59 U/L (ref 46–116)
ALT SERPL W P-5'-P-CCNC: 11 U/L (ref 12–78)
ANION GAP SERPL CALCULATED.3IONS-SCNC: 11 MMOL/L (ref 4–13)
AST SERPL W P-5'-P-CCNC: 11 U/L (ref 5–45)
ATRIAL RATE: 90 BPM
BASOPHILS # BLD AUTO: 0.02 THOUSANDS/ÂΜL (ref 0–0.1)
BASOPHILS NFR BLD AUTO: 0 % (ref 0–1)
BILIRUB SERPL-MCNC: 0.26 MG/DL (ref 0.2–1)
BUN SERPL-MCNC: 8 MG/DL (ref 5–25)
CALCIUM SERPL-MCNC: 8.9 MG/DL (ref 8.3–10.1)
CARDIAC TROPONIN I PNL SERPL HS: <2 NG/L
CHLORIDE SERPL-SCNC: 103 MMOL/L (ref 96–108)
CO2 SERPL-SCNC: 24 MMOL/L (ref 21–32)
CREAT SERPL-MCNC: 0.4 MG/DL (ref 0.6–1.3)
D DIMER PPP FEU-MCNC: 0.76 UG/ML FEU
EOSINOPHIL # BLD AUTO: 0.06 THOUSAND/ÂΜL (ref 0–0.61)
EOSINOPHIL NFR BLD AUTO: 1 % (ref 0–6)
ERYTHROCYTE [DISTWIDTH] IN BLOOD BY AUTOMATED COUNT: 14.1 % (ref 11.6–15.1)
FLUAV RNA RESP QL NAA+PROBE: NEGATIVE
FLUBV RNA RESP QL NAA+PROBE: NEGATIVE
GFR SERPL CREATININE-BSD FRML MDRD: 149 ML/MIN/1.73SQ M
GLUCOSE SERPL-MCNC: 87 MG/DL (ref 65–140)
HCT VFR BLD AUTO: 34.6 % (ref 34.8–46.1)
HGB BLD-MCNC: 11.1 G/DL (ref 11.5–15.4)
IMM GRANULOCYTES # BLD AUTO: 0.03 THOUSAND/UL (ref 0–0.2)
IMM GRANULOCYTES NFR BLD AUTO: 0 % (ref 0–2)
LYMPHOCYTES # BLD AUTO: 1.72 THOUSANDS/ÂΜL (ref 0.6–4.47)
LYMPHOCYTES NFR BLD AUTO: 20 % (ref 14–44)
MCH RBC QN AUTO: 28.6 PG (ref 26.8–34.3)
MCHC RBC AUTO-ENTMCNC: 32.1 G/DL (ref 31.4–37.4)
MCV RBC AUTO: 89 FL (ref 82–98)
MONOCYTES # BLD AUTO: 0.58 THOUSAND/ÂΜL (ref 0.17–1.22)
MONOCYTES NFR BLD AUTO: 7 % (ref 4–12)
NEUTROPHILS # BLD AUTO: 6.4 THOUSANDS/ÂΜL (ref 1.85–7.62)
NEUTS SEG NFR BLD AUTO: 72 % (ref 43–75)
NRBC BLD AUTO-RTO: 0 /100 WBCS
P AXIS: 77 DEGREES
PLATELET # BLD AUTO: 201 THOUSANDS/UL (ref 149–390)
PMV BLD AUTO: 11.4 FL (ref 8.9–12.7)
POTASSIUM SERPL-SCNC: 3.8 MMOL/L (ref 3.5–5.3)
PR INTERVAL: 148 MS
PROT SERPL-MCNC: 7.9 G/DL (ref 6.4–8.4)
QRS AXIS: 86 DEGREES
QRSD INTERVAL: 76 MS
QT INTERVAL: 340 MS
QTC INTERVAL: 415 MS
RBC # BLD AUTO: 3.88 MILLION/UL (ref 3.81–5.12)
RSV RNA RESP QL NAA+PROBE: NEGATIVE
SARS-COV-2 RNA RESP QL NAA+PROBE: NEGATIVE
SODIUM SERPL-SCNC: 138 MMOL/L (ref 135–147)
T WAVE AXIS: 67 DEGREES
VENTRICULAR RATE: 90 BPM
WBC # BLD AUTO: 8.81 THOUSAND/UL (ref 4.31–10.16)

## 2022-12-27 RX ORDER — ACETAMINOPHEN 325 MG/1
975 TABLET ORAL ONCE
Status: COMPLETED | OUTPATIENT
Start: 2022-12-27 | End: 2022-12-27

## 2022-12-27 RX ADMIN — IOHEXOL 85 ML: 350 INJECTION, SOLUTION INTRAVENOUS at 20:02

## 2022-12-27 RX ADMIN — ACETAMINOPHEN 975 MG: 325 TABLET, FILM COATED ORAL at 17:33

## 2022-12-27 NOTE — ED PROVIDER NOTES
History  Chief Complaint   Patient presents with   • Pain With Breathing     Pt reports pain with taking a deep breath  Denies cough, congestion, runny nose  Currently 8 weeks pregnant     Patient is a 43-year-old female past medical history of asthma, knee presenting 8 weeks gestation with chest pain  Patient had central nonradiating chest pain which is stabbing nature, constant and worse with deep breathing for the past 6 hours  She denies any fevers, shortness of breath, leg pain or swelling, nausea or vomiting, dizziness, cough, nasal congestion, rashes, vision changes, dysuria  Denies any injuries and has not taken any medication for it  Denies any prior blood clots or coagulopathies, cancer diagnosis, trauma, immobilization, surgeries  Prior to Admission Medications   Prescriptions Last Dose Informant Patient Reported? Taking? Prenatal Vit-Fe Fumarate-FA (Prenatal Vitamin) 27-0 8 MG TABS   Yes No   Sig: Take by mouth   Patient not taking: Reported on 10/7/2022   albuterol (PROVENTIL HFA,VENTOLIN HFA) 90 mcg/act inhaler   No No   Sig: Inhale 2 puffs every 6 (six) hours as needed for wheezing   diclofenac (VOLTAREN) 75 mg EC tablet   No No   Sig: Take 1 tablet (75 mg total) by mouth 2 (two) times a day as needed (moderate pain)   ferrous sulfate 324 (65 Fe) mg   No No   Sig: TAKE 1 TABLET BY MOUTH DAILY BEFORE BREAKFAST   loratadine (CLARITIN) 10 mg tablet   Yes No   Sig: Take 10 mg by mouth daily    norethindrone (MICRONOR) 0 35 MG tablet   Yes No   Sig: Take 1 tablet by mouth daily   Patient not taking: Reported on 11/15/2022      Facility-Administered Medications: None       Past Medical History:   Diagnosis Date   • Asthma     albuterol prn   • Depression     intermittent   no meds   • Varicella     had vaccines       History reviewed  No pertinent surgical history      Family History   Problem Relation Age of Onset   • Asthma Mother    • No Known Problems Father    • No Known Problems Sister • No Known Problems Brother    • Asthma Maternal Grandmother    • Hypertension Maternal Grandmother    • No Known Problems Maternal Grandfather    • Diabetes Paternal Grandmother    • No Known Problems Paternal Grandfather    • Ovarian cancer Neg Hx    • Breast cancer Neg Hx    • Cervical cancer Neg Hx    • Uterine cancer Neg Hx      I have reviewed and agree with the history as documented  E-Cigarette/Vaping   • E-Cigarette Use Never User      E-Cigarette/Vaping Substances   • Nicotine No    • THC No    • CBD No    • Flavoring No    • Other No    • Unknown No      Social History     Tobacco Use   • Smoking status: Never   • Smokeless tobacco: Never   Vaping Use   • Vaping Use: Never used   Substance Use Topics   • Alcohol use: Yes     Comment: occasionally   • Drug use: Not Currently       Review of Systems   All other systems reviewed and are negative  Physical Exam  Physical Exam  Vitals and nursing note reviewed  Constitutional:       General: She is not in acute distress  Appearance: She is well-developed  HENT:      Head: Normocephalic and atraumatic  Eyes:      Conjunctiva/sclera: Conjunctivae normal    Cardiovascular:      Rate and Rhythm: Normal rate and regular rhythm  Pulses: Normal pulses  Heart sounds: No murmur heard  Pulmonary:      Effort: Pulmonary effort is normal  No respiratory distress  Breath sounds: Normal breath sounds  Chest:      Chest wall: Tenderness present  Abdominal:      Palpations: Abdomen is soft  Tenderness: There is no abdominal tenderness  Musculoskeletal:         General: No swelling  Cervical back: Neck supple  Right lower leg: No edema  Left lower leg: No edema  Skin:     General: Skin is warm and dry  Capillary Refill: Capillary refill takes less than 2 seconds  Neurological:      Mental Status: She is alert     Psychiatric:         Mood and Affect: Mood normal          Vital Signs  ED Triage Vitals Temperature Pulse Respirations Blood Pressure SpO2   12/27/22 1528 12/27/22 1528 12/27/22 1528 12/27/22 1533 12/27/22 1529   97 5 °F (36 4 °C) 78 18 138/74 100 %      Temp Source Heart Rate Source Patient Position - Orthostatic VS BP Location FiO2 (%)   12/27/22 1528 12/27/22 1528 -- 12/27/22 1528 --   Temporal Monitor  Left arm       Pain Score       --                  Vitals:    12/27/22 1528 12/27/22 1533   BP:  138/74   Pulse: 78          Visual Acuity      ED Medications  Medications   acetaminophen (TYLENOL) tablet 975 mg (has no administration in time range)       Diagnostic Studies  Results Reviewed     Procedure Component Value Units Date/Time    Comprehensive metabolic panel [[id number]]     Lab Status: No result Specimen: Blood     CBC and differential [[id number]]     Lab Status: No result Specimen: Blood     HS Troponin 0hr (reflex protocol) [[id number]]     Lab Status: No result Specimen: Blood     D-Dimer [[id number]]     Lab Status: No result Specimen: Blood     FLU/RSV/COVID - if FLU/RSV clinically relevant [[id number]]  (Normal) Collected: 12/27/22 1530    Lab Status: Final result Specimen: Nares from Nose Updated: 12/27/22 1640     SARS-CoV-2 Negative     INFLUENZA A PCR Negative     INFLUENZA B PCR Negative     RSV PCR Negative    Narrative:      FOR PEDIATRIC PATIENTS - copy/paste COVID Guidelines URL to browser: [email or web address]    SARS-CoV-2 assay is a Nucleic Acid Amplification assay intended for the  qualitative detection of nucleic acid from SARS-CoV-2 in nasopharyngeal  swabs  Results are for the presumptive identification of SARS-CoV-2 RNA  Positive results are indicative of infection with SARS-CoV-2, the virus  causing COVID-19, but do not rule out bacterial infection or co-infection  with other viruses   Laboratories within the [place] and its  territories are required to report all positive results to the appropriate  public health authorities  Negative results do not preclude SARS-CoV-2  infection and should not be used as the sole basis for treatment or other  patient management decisions  Negative results must be combined with  clinical observations, patient history, and epidemiological information  This test has not been FDA cleared or approved  This test has been authorized by FDA under an Emergency Use Authorization  (EUA)  This test is only authorized for the duration of time the  declaration that circumstances exist justifying the authorization of the  emergency use of an in vitro diagnostic tests for detection of SARS-CoV-2  virus and/or diagnosis of COVID-19 infection under section 564(b)(1) of  the Act, 21 U  S C  503GOB-3(K)(2), unless the authorization is terminated  or revoked sooner  The test has been validated but independent review by FDA  and CLIA is pending  Test performed using [employer] Gene[employer]pert: This RT-PCR assay targets N2,  a region unique to SARS-CoV-2  A conserved region in the E-gene was chosen  for pan-Sarbecovirus detection which includes SARS-CoV-2  According to CMS-2020-01-R, this platform meets the definition of high-throughput technology                   XR chest 2 views    (Results Pending)              Procedures  ECG 12 Lead Documentation Only    Date/Time: 12/27/2022 5:12 PM  Performed by: Minor Desouza DO  Authorized by: Minor Desouza DO     ECG reviewed by me, the ED Provider: yes    Patient location:  ED  Previous ECG:     Previous ECG:  Unavailable  Interpretation:     Interpretation: normal    Rate:     ECG rate assessment: normal    Rhythm:     Rhythm: sinus rhythm    Ectopy:     Ectopy: none    QRS:     QRS axis:  Normal    QRS intervals:  Normal  Conduction:     Conduction: normal    ST segments:     ST segments:  Normal  T waves:     T waves: inverted      Inverted:  V2 and aVL             ED Course  ED Course as of 12/27/22 2144   Tue Dec 27, 2022 1901 Elevated dimer, have discussed risk versus benefit with patient who is comfortable receiving CT PE at this time  2119 Work-up negative, will discharge with outpatient follow-up  PERC Rule for PE    Flowsheet Row Most Recent Value   PERC Rule for PE    Age >=50 0 Filed at: 12/27/2022 1705   HR >=100 0 Filed at: 12/27/2022 1705   O2 Sat on room air < 95% 0 Filed at: 12/27/2022 1705   History of PE or DVT --   Recent trauma or surgery 0 Filed at: 12/27/2022 1705   Hemoptysis 0 Filed at: 12/27/2022 1705   Exogenous estrogen 1 Filed at: 12/27/2022 1705   Unilateral leg swelling 0 Filed at: 12/27/2022 1705   PERC Rule for PE Results 1 Filed at: 12/27/2022 1705              SBIRT 22yo+    Flowsheet Row Most Recent Value   SBIRT (23 yo +)    In order to provide better care to our patients, we are screening all of our patients for alcohol and drug use  Would it be okay to ask you these screening questions? No Filed at: 12/27/2022 1705   Initial Alcohol Screen: US AUDIT-C     1  How often do you have a drink containing alcohol? 0 Filed at: 12/27/2022 1705   2  How many drinks containing alcohol do you have on a typical day you are drinking? 0 Filed at: 12/27/2022 1705   3a  Male UNDER 65: How often do you have five or more drinks on one occasion? 0 Filed at: 12/27/2022 1705   3b  FEMALE Any Age, or MALE 65+: How often do you have 4 or more drinks on one occassion? 0 Filed at: 12/27/2022 1705   Audit-C Score 0 Filed at: 12/27/2022 1705   LORENZA: How many times in the past year have you    Used an illegal drug or used a prescription medication for non-medical reasons?  Never Filed at: 12/27/2022 1705          Wells' Criteria for PE    Flowsheet Row Most Recent Value   Wells' Criteria for PE    Clinical signs and symptoms of DVT 0 Filed at: 12/27/2022 1705   PE is primary diagnosis or equally likely 0 Filed at: 12/27/2022 1705   HR >100 0 Filed at: 12/27/2022 1705   Immobilization at least 3 days or Surgery in the previous 4 weeks 0 Filed at: 12/27/2022 1705   Previous, objectively diagnosed PE or DVT 0 Filed at: 12/27/2022 1705   Hemoptysis 0 Filed at: 12/27/2022 1705   Malignancy with treatment within 6 months or palliative 0 Filed at: 12/27/2022 1705   Wells' Criteria Total 0 Filed at: 12/27/2022 1705                MDM  Number of Diagnoses or Management Options  Diagnosis management comments: Patient is a 52-year-old female past medical history of anemia, asthma presenting with pleuritic chest pain  Patient is well-appearing at bedside with stable vitals and in no acute distress  Initial EKG shows T wave inversion in V2 and aVL, no prior for comparison and otherwise unremarkable  Patient is Marshell Ligia criteria low, will obtain cardiac work-up including D-dimer low concern for ACS however given pregnancy will rule out PE, give pain control and reassess  Disposition  Final diagnoses:   None     ED Disposition     None      Follow-up Information    None         Patient's Medications   Discharge Prescriptions    No medications on file       No discharge procedures on file      PDMP Review     None          ED Provider  Electronically Signed by           Emily Woodson DO  12/27/22 9659

## 2023-02-14 ENCOUNTER — OFFICE VISIT (OUTPATIENT)
Dept: OBGYN CLINIC | Facility: CLINIC | Age: 23
End: 2023-02-14

## 2023-02-14 VITALS
WEIGHT: 146.4 LBS | HEIGHT: 61 IN | SYSTOLIC BLOOD PRESSURE: 120 MMHG | DIASTOLIC BLOOD PRESSURE: 76 MMHG | BODY MASS INDEX: 27.64 KG/M2

## 2023-02-14 DIAGNOSIS — N76.0 BV (BACTERIAL VAGINOSIS): Primary | ICD-10-CM

## 2023-02-14 DIAGNOSIS — Z11.3 SCREENING FOR STD (SEXUALLY TRANSMITTED DISEASE): ICD-10-CM

## 2023-02-14 DIAGNOSIS — B96.89 BV (BACTERIAL VAGINOSIS): Primary | ICD-10-CM

## 2023-02-14 LAB
BV WHIFF TEST VAG QL: POSITIVE
CLUE CELLS SPEC QL WET PREP: POSITIVE
PH SMN: 5.5 [PH]
T VAGINALIS VAG QL WET PREP: NEGATIVE
YEAST VAG QL WET PREP: NEGATIVE

## 2023-02-14 RX ORDER — METRONIDAZOLE 500 MG/1
500 TABLET ORAL EVERY 12 HOURS SCHEDULED
Qty: 14 TABLET | Refills: 0 | Status: SHIPPED | OUTPATIENT
Start: 2023-02-14 | End: 2023-02-21

## 2023-02-14 NOTE — PROGRESS NOTES
Assessment/Plan:    -perineal hygiene reviewed  -Condoms advised with all sexual activity to prevent STIs  -call if symptoms do not resolve or worsen  Return for annual     Diagnoses and all orders for this visit:    BV (bacterial vaginosis)  -     metroNIDAZOLE (FLAGYL) 500 mg tablet; Take 1 tablet (500 mg total) by mouth every 12 (twelve) hours for 7 days  -     POCT wet mount    Screening for STD (sexually transmitted disease)  -     Chlamydia/GC amplified DNA by PCR  -     RPR (Reflex Only-Do Not Order); Future  -     HIV 1/2 Antigen/Antibody (4th Generation) w Reflex [facility]; Future  -     Hepatitis B surface antigen; Future  -     Hepatitis C antibody; Future          Subjective:      Patient ID: Alex Grimm is a 25 y o  female presents for white vaginal discharge, itching, and odor x 1 month  [facility]  Denies any recent antibiotic use  Denies fevers, chills, abdominal/pelvic pain, dyspareunia, urinary symptoms  On and off vaginal spotting  Currently on POPs for contraception  Desires STD screening today  The following portions of the patient's history were reviewed and updated as appropriate:   She  has a past medical history of Asthma, Depression, and Varicella  She   Patient Active Problem List    Diagnosis Date Noted   • Retained products of conception following  11/15/2022   • Iron deficiency anemia 06/10/2022   • BMI 25 0-25 9,adult 06/10/2022   • History of depression 2021   • Mild intermittent asthma without complication      She  has no past surgical history on file  Her family history includes Asthma in her maternal grandmother and mother; Diabetes in her paternal grandmother; Hypertension in her maternal grandmother; No Known Problems in her brother, father, maternal grandfather, paternal grandfather, and sister  She  reports that she has never smoked   She has never used smokeless tobacco  She reports current alcohol use  She reports that she does not currently use drugs  Current Outpatient Medications   Medication Sig Dispense Refill   • albuterol (PROVENTIL HFA,VENTOLIN HFA) 90 mcg/act inhaler Inhale 2 puffs every 6 (six) hours as needed for wheezing 6 7 g 1   • diclofenac (VOLTAREN) 75 mg EC tablet Take 1 tablet (75 mg total) by mouth 2 (two) times a day as needed (moderate pain) 30 tablet 0   • ferrous sulfate 324 (65 Fe) mg TAKE 1 TABLET BY MOUTH DAILY BEFORE BREAKFAST 30 tablet 0   • loratadine (CLARITIN) 10 mg tablet Take 10 mg by mouth daily      • metroNIDAZOLE (FLAGYL) 500 mg tablet Take 1 tablet (500 mg total) by mouth every 12 (twelve) hours for 7 days 14 tablet 0   • norethindrone (MICRONOR) 0 35 MG tablet Take 1 tablet by mouth daily     • Prenatal Vit-Fe Fumarate-FA (Prenatal Vitamin) 27-0 8 MG TABS Take by mouth (Patient not taking: Reported on 10/7/2022)       No current facility-administered medications for this visit  She has No Known Allergies       Review of Systems   Constitutional: Negative for chills and fever  Gastrointestinal: Negative for abdominal pain, nausea and vomiting  Genitourinary: Positive for vaginal discharge  Negative for dyspareunia, dysuria, flank pain, frequency, pelvic pain, urgency and vaginal pain  Musculoskeletal: Negative for back pain and myalgias  Skin: Negative for rash  Neurological: Negative for light-headedness  Hematological: Negative for adenopathy  Psychiatric/Behavioral: Negative for confusion  Objective:      /76 (BP Location: Left arm, Patient Position: Sitting, Cuff Size: Standard)   Ht 5' 1" (1 549 m)   Wt 66 4 kg (146 lb 6 4 oz)   Breastfeeding No   BMI 27 66 kg/m²          Physical Exam  Vitals and nursing note reviewed  Constitutional:       General: She is not in acute distress  Appearance: Normal appearance  She is not ill-appearing  HENT:      Head: Normocephalic and atraumatic     Eyes:      Conjunctiva/sclera: Conjunctivae normal    Pulmonary:      Effort: Pulmonary effort is normal    Abdominal:      Palpations: Abdomen is soft  Tenderness: There is no abdominal tenderness  Genitourinary:     General: Normal vulva  Labia:         Right: No rash, tenderness, lesion or injury  Left: No rash, tenderness, lesion or injury  Urethra: No prolapse, urethral pain, urethral swelling or urethral lesion  Vagina: No signs of injury and foreign body  Vaginal discharge present  No erythema, tenderness, bleeding, lesions or prolapsed vaginal walls  Cervix: No cervical motion tenderness, discharge, friability, lesion, erythema, cervical bleeding or eversion  Uterus: Not deviated, not enlarged, not fixed, not tender and no uterine prolapse  Adnexa:         Right: No mass, tenderness or fullness  Left: No mass, tenderness or fullness  Musculoskeletal:         General: Normal range of motion  Cervical back: Neck supple  Lymphadenopathy:      Lower Body: No right inguinal adenopathy  No left inguinal adenopathy  Skin:     General: Skin is warm and dry  Neurological:      General: No focal deficit present  Mental Status: She is alert     Psychiatric:         Mood and Affect: Mood normal          Behavior: Behavior normal

## 2023-02-14 NOTE — PATIENT INSTRUCTIONS
Bacterial Vaginosis   AMBULATORY CARE:   Bacterial vaginosis  is an infection in the vagina  It may cause vaginitis (irritation and inflammation of the vagina)  The cause is not known  Bacteria normally found in the vagina are imbalanced  Your risk increases if you are sexually active, you use a douche, or you have an intrauterine device (IUD)  Common signs and symptoms of bacterial vaginosis:   White, gray, or yellow vaginal discharge    Vaginal discharge that smells like fish    Itching or burning around the outside of your vagina    Call your doctor or gynecologist if:   Your symptoms come back or do not improve with treatment  You have vaginal bleeding that is not your monthly period  You have questions or concerns about your condition or care  Antibiotics  are given to kill the bacteria  They may be given as a pill or as a cream to put in your vagina  Bacterial vaginosis and pregnancy:  If you have bacterial vaginosis during pregnancy, your baby may be born early or have a low birth weight  Your healthcare provider may recommend testing for bacterial vaginosis before or during your pregnancy  He or she will talk to you about your risk for premature delivery, and make sure you know the benefits and risks of testing  Prevent bacterial vaginosis:   Keep your vaginal area clean and dry  Wear underwear and pantyhose with a cotton crotch  Wipe from front to back after you urinate or have a bowel movement  After you bathe, rinse soap from your vaginal area to decrease your risk for irritation  Do not use products that cause irritation  Always use unscented tampons or sanitary pads  Do not use feminine sprays, powders, or scented tampons  They may cause irritation and increase your risk for vaginosis  Detergents and fabric softeners may also cause irritation  Do not use a douche  This can cause an imbalance in healthy vaginal bacteria  Use latex condoms during sex    This helps prevent another infection and keeps your partner from getting the infection  Follow up with your doctor or gynecologist as directed: Bacterial vaginosis increases the risk for several health problems, such as pelvic inflammatory disease (PID) or sexually transmitted infections  Work with your healthcare providers to schedule regular appointments to check for health problems  Write down your questions so you remember to ask them during your visits  © Copyright [employer] 2022 Information is for End User's use only and may not be sold, redistributed or otherwise used for commercial purposes  All illustrations and images included in CareNotes® are the copyrighted property of [employer]  or [facility] Chetan Mason   The above information is an  only  It is not intended as medical advice for individual conditions or treatments  Talk to your doctor, nurse or pharmacist before following any medical regimen to see if it is safe and effective for you

## 2023-02-15 DIAGNOSIS — Z30.41 ENCOUNTER FOR SURVEILLANCE OF CONTRACEPTIVE PILLS: Primary | ICD-10-CM

## 2023-02-15 LAB
C TRACH DNA SPEC QL NAA+PROBE: NEGATIVE
N GONORRHOEA DNA SPEC QL NAA+PROBE: NEGATIVE

## 2023-02-15 RX ORDER — ACETAMINOPHEN AND CODEINE PHOSPHATE 120; 12 MG/5ML; MG/5ML
1 SOLUTION ORAL DAILY
Qty: 84 TABLET | Refills: 0 | Status: SHIPPED | OUTPATIENT
Start: 2023-02-15

## 2023-02-17 ENCOUNTER — APPOINTMENT (OUTPATIENT)
Dept: LAB | Facility: CLINIC | Age: 23
End: 2023-02-17

## 2023-02-17 DIAGNOSIS — Z11.3 SCREENING FOR STD (SEXUALLY TRANSMITTED DISEASE): ICD-10-CM

## 2023-02-17 DIAGNOSIS — J45.20 MILD INTERMITTENT ASTHMA WITHOUT COMPLICATION: ICD-10-CM

## 2023-02-17 DIAGNOSIS — J30.1 ALLERGIC RHINITIS DUE TO POLLEN, UNSPECIFIED SEASONALITY: Primary | ICD-10-CM

## 2023-02-17 LAB
HBV SURFACE AG SER QL: NORMAL
HCV AB SER QL: NORMAL

## 2023-02-17 RX ORDER — ALBUTEROL SULFATE 90 UG/1
2 AEROSOL, METERED RESPIRATORY (INHALATION) EVERY 6 HOURS PRN
Qty: 6.7 G | Refills: 0 | OUTPATIENT
Start: 2023-02-17

## 2023-02-17 RX ORDER — LORATADINE 10 MG/1
10 TABLET ORAL DAILY
Qty: 90 TABLET | Refills: 0 | Status: SHIPPED | OUTPATIENT
Start: 2023-02-17

## 2023-02-18 LAB
HIV 1+2 AB+HIV1 P24 AG SERPL QL IA: NORMAL
HIV 2 AB SERPL QL IA: NORMAL
HIV1 AB SERPL QL IA: NORMAL
HIV1 P24 AG SERPL QL IA: NORMAL
TREPONEMA PALLIDUM IGG+IGM AB [PRESENCE] IN SERUM OR PLASMA BY IMMUNOASSAY: NORMAL

## 2023-03-13 DIAGNOSIS — N76.0 BV (BACTERIAL VAGINOSIS): Primary | ICD-10-CM

## 2023-03-13 DIAGNOSIS — B96.89 BV (BACTERIAL VAGINOSIS): Primary | ICD-10-CM

## 2023-03-13 RX ORDER — METRONIDAZOLE 500 MG/1
500 TABLET ORAL EVERY 12 HOURS SCHEDULED
Qty: 14 TABLET | Refills: 0 | Status: SHIPPED | OUTPATIENT
Start: 2023-03-13 | End: 2023-03-20

## 2023-05-10 ENCOUNTER — HOSPITAL ENCOUNTER (EMERGENCY)
Facility: HOSPITAL | Age: 23
Discharge: HOME | End: 2023-05-10
Attending: EMERGENCY MEDICINE

## 2023-05-10 ENCOUNTER — APPOINTMENT (EMERGENCY)
Dept: RADIOLOGY | Facility: HOSPITAL | Age: 23
End: 2023-05-10

## 2023-05-10 VITALS
OXYGEN SATURATION: 96 % | HEART RATE: 84 BPM | RESPIRATION RATE: 18 BRPM | DIASTOLIC BLOOD PRESSURE: 61 MMHG | TEMPERATURE: 97.4 F | SYSTOLIC BLOOD PRESSURE: 103 MMHG

## 2023-05-10 DIAGNOSIS — M54.9 BACK PAIN: Primary | ICD-10-CM

## 2023-05-10 LAB
ATRIAL RATE: 79 BPM
EXT PREGNANCY TEST URINE: NEGATIVE
EXT. CONTROL: NORMAL
P AXIS: 85 DEGREES
PR INTERVAL: 150 MS
QRS AXIS: 89 DEGREES
QRSD INTERVAL: 74 MS
QT INTERVAL: 354 MS
QTC INTERVAL: 405 MS
T WAVE AXIS: 78 DEGREES
VENTRICULAR RATE: 79 BPM

## 2023-05-10 RX ORDER — KETOROLAC TROMETHAMINE 10 MG/1
10 TABLET, FILM COATED ORAL EVERY 6 HOURS PRN
Qty: 12 TABLET | Refills: 0 | Status: SHIPPED | OUTPATIENT
Start: 2023-05-10

## 2023-05-10 RX ORDER — DIAZEPAM 5 MG/ML
5 INJECTION, SOLUTION INTRAMUSCULAR; INTRAVENOUS ONCE
Status: COMPLETED | OUTPATIENT
Start: 2023-05-10 | End: 2023-05-10

## 2023-05-10 RX ORDER — KETOROLAC TROMETHAMINE 10 MG/1
10 TABLET, FILM COATED ORAL ONCE
Status: COMPLETED | OUTPATIENT
Start: 2023-05-10 | End: 2023-05-10

## 2023-05-10 RX ORDER — DIAZEPAM 5 MG/1
5 TABLET ORAL EVERY 8 HOURS PRN
Qty: 15 TABLET | Refills: 0 | Status: SHIPPED | OUTPATIENT
Start: 2023-05-10 | End: 2023-05-15

## 2023-05-10 RX ADMIN — DIAZEPAM 5 MG: 10 INJECTION, SOLUTION INTRAMUSCULAR; INTRAVENOUS at 14:27

## 2023-05-10 RX ADMIN — KETOROLAC TROMETHAMINE 10 MG: 10 TABLET, FILM COATED ORAL at 14:27

## 2023-05-10 NOTE — ED PROVIDER NOTES
History  Chief Complaint   Patient presents with   • Back Pain     C/o upper back pain since earlier this morning  Denies injury  States pain is worse with movement  25year old female pt comes to the ED with cc of back pain  She has palpable paraspinal muscle spasm on the right side which is where she is having pain  She has no fever  She denies all other medical history  She has no other abnormalities on physical exam       Will do urine preg  There is no reason for imaging  She has no neurologic abnormalities  History provided by:  Patient   used: No    Back Pain  Location:  Generalized  Quality:  Aching  Radiates to:  Does not radiate  Pain severity:  Mild  Onset quality:  Gradual  Timing:  Constant  Progression:  Worsening  Chronicity:  New  Context: not falling, not lifting heavy objects and not recent illness    Relieved by:  Nothing  Worsened by:  Nothing  Ineffective treatments:  None tried  Associated symptoms: no abdominal pain, no bladder incontinence, no leg pain and no numbness        Prior to Admission Medications   Prescriptions Last Dose Informant Patient Reported? Taking? Prenatal Vit-Fe Fumarate-FA (Prenatal Vitamin) 27-0 8 MG TABS   Yes No   Sig: Take by mouth   Patient not taking: Reported on 10/7/2022   albuterol (PROVENTIL HFA,VENTOLIN HFA) 90 mcg/act inhaler   No No   Sig: Inhale 2 puffs every 6 (six) hours as needed for wheezing   ferrous sulfate 324 (65 Fe) mg   No No   Sig: TAKE 1 TABLET BY MOUTH DAILY BEFORE BREAKFAST   loratadine (CLARITIN) 10 mg tablet   No No   Sig: Take 1 tablet (10 mg total) by mouth daily   norethindrone (MICRONOR) 0 35 MG tablet   No No   Sig: TAKE 1 TABLET BY MOUTH EVERY DAY      Facility-Administered Medications: None       Past Medical History:   Diagnosis Date   • Asthma     albuterol prn   • Depression     intermittent   no meds   • Varicella     had vaccines       History reviewed   No pertinent surgical history  Family History   Problem Relation Age of Onset   • Asthma Mother    • No Known Problems Father    • No Known Problems Sister    • No Known Problems Brother    • Asthma Maternal Grandmother    • Hypertension Maternal Grandmother    • No Known Problems Maternal Grandfather    • Diabetes Paternal Grandmother    • No Known Problems Paternal Grandfather    • Ovarian cancer Neg Hx    • Breast cancer Neg Hx    • Cervical cancer Neg Hx    • Uterine cancer Neg Hx      I have reviewed and agree with the history as documented  E-Cigarette/Vaping   • E-Cigarette Use Never User      E-Cigarette/Vaping Substances   • Nicotine No    • THC No    • CBD No    • Flavoring No    • Other No    • Unknown No      Social History     Tobacco Use   • Smoking status: Never   • Smokeless tobacco: Never   Vaping Use   • Vaping Use: Never used   Substance Use Topics   • Alcohol use: Yes     Comment: occasionally   • Drug use: Not Currently       Review of Systems   Gastrointestinal: Negative for abdominal pain  Genitourinary: Negative for bladder incontinence  Musculoskeletal: Positive for back pain  Neurological: Negative for numbness  All other systems reviewed and are negative  Physical Exam  Physical Exam  Vitals and nursing note reviewed  Constitutional:       Appearance: She is well-developed  HENT:      Head: Normocephalic and atraumatic  Right Ear: External ear normal       Left Ear: External ear normal    Eyes:      Conjunctiva/sclera: Conjunctivae normal    Neck:      Thyroid: No thyromegaly  Vascular: No JVD  Trachea: No tracheal deviation  Cardiovascular:      Rate and Rhythm: Normal rate  Pulmonary:      Effort: Pulmonary effort is normal       Breath sounds: Normal breath sounds  No stridor  Abdominal:      General: There is no distension  Palpations: Abdomen is soft  There is no mass  Tenderness: There is no abdominal tenderness  There is no guarding        Hernia: No hernia is present  Musculoskeletal:         General: No tenderness or deformity  Normal range of motion  Lymphadenopathy:      Cervical: No cervical adenopathy  Skin:     General: Skin is warm  Coloration: Skin is not pale  Findings: No erythema or rash  Neurological:      Mental Status: She is alert and oriented to person, place, and time  Psychiatric:         Behavior: Behavior normal          Vital Signs  ED Triage Vitals   Temperature Pulse Respirations Blood Pressure SpO2   05/10/23 1238 05/10/23 1238 05/10/23 1238 05/10/23 1238 05/10/23 1238   (!) 97 4 °F (36 3 °C) 84 18 103/61 96 %      Temp src Heart Rate Source Patient Position - Orthostatic VS BP Location FiO2 (%)   -- 05/10/23 1238 -- 05/10/23 1238 --    Monitor  Left arm       Pain Score       05/10/23 1423       10 - Worst Possible Pain           Vitals:    05/10/23 1238   BP: 103/61   Pulse: 84         Visual Acuity      ED Medications  Medications   diazepam (VALIUM) injection 5 mg (5 mg Intramuscular Given 5/10/23 1427)   ketorolac (TORADOL) tablet 10 mg (10 mg Oral Given 5/10/23 1427)       Diagnostic Studies  Results Reviewed     Procedure Component Value Units Date/Time    POCT pregnancy, urine [[id number]]  (Normal) Resulted: 05/10/23 1423    Lab Status: Final result Updated: 05/10/23 1424     EXT Preg Test, Ur Negative     Control Valid                 XR spine thoracic 3 vw   Final Result by Barry Beyer MD (05/11 9059)      No acute osseous abnormality  Workstation performed: [id number]                    Procedures  Procedures         ED Course  ED Course as of 05/11/23 1727   Wed May 10, 2023   1527 Increased kyphotic curve without acute abnormality  SBIRT 20yo+    Flowsheet Row Most Recent Value   Initial Alcohol Screen: US AUDIT-C     1  How often do you have a drink containing alcohol? 0 Filed at: 05/10/2023 1424   2   How many drinks containing alcohol do you have on a typical day you are drinking? 0 Filed at: 05/10/2023 1424   3b  FEMALE Any Age, or MALE 65+: How often do you have 4 or more drinks on one occassion? 0 Filed at: 05/10/2023 1424   Audit-C Score 0 Filed at: 05/10/2023 1424   LORENZA: How many times in the past year have you    Used an illegal drug or used a prescription medication for non-medical reasons? Never Filed at: 05/10/2023 1424                    Medical Decision Making  Back pain: acute illness or injury  Amount and/or Complexity of Data Reviewed  Labs: ordered  Radiology: ordered  Risk  Prescription drug management  Disposition  Final diagnoses:   Back pain     Time reflects when diagnosis was documented in both MDM as applicable and the Disposition within this note     Time User Action Codes Description Comment    5/10/2023  2:28 PM Nolvia Aguilera Add [M54 9] Back pain       ED Disposition     ED Disposition   Discharge    Condition   Stable    Date/Time   Wed May 10, 2023  2:28 PM    Comment   Ryann Red discharge to home/self care                 Follow-up Information     Follow up With Specialties Details [address] [facility]   [facility] [address] [facility]  [phone]            Discharge Medication List as of 5/10/2023  2:29 PM      START taking these medications    Details   diazepam (VALIUM) 5 mg tablet Take 1 tablet (5 mg total) by mouth every 8 (eight) hours as needed for anxiety for up to 5 days, Starting Wed 5/10/2023, Until Mon 5/15/2023 at 2359, Print      ketorolac (TORADOL) 10 mg tablet Take 1 tablet (10 mg total) by mouth every 6 (six) hours as needed for moderate pain, Starting Wed 5/10/2023, Normal         CONTINUE these medications which have NOT CHANGED    Details   albuterol (PROVENTIL HFA,VENTOLIN HFA) 90 mcg/act inhaler Inhale 2 puffs every 6 (six) hours as needed for wheezing, Starting Fri 6/10/2022, Normal      ferrous sulfate 324 (65 Fe) mg TAKE 1 TABLET BY MOUTH DAILY BEFORE BREAKFAST, Normal      loratadine (CLARITIN) 10 mg tablet Take 1 tablet (10 mg total) by mouth daily, Starting Fri 2/17/2023, Normal      norethindrone (MICRONOR) 0 35 MG tablet TAKE 1 TABLET BY MOUTH EVERY DAY, Normal      Prenatal Vit-Fe Fumarate-FA (Prenatal Vitamin) 27-0 8 MG TABS Take by mouth, Historical Med             No discharge procedures on file      PDMP Review     None          ED Provider  Electronically Signed by           Herson Ortega DO  05/11/23 9895

## 2023-06-19 PROBLEM — Z01.419 ENCOUNTER FOR ANNUAL ROUTINE GYNECOLOGICAL EXAMINATION: Status: ACTIVE | Noted: 2023-06-19

## 2023-06-20 ENCOUNTER — TELEPHONE (OUTPATIENT)
Dept: OBGYN CLINIC | Facility: CLINIC | Age: 23
End: 2023-06-20

## 2023-06-20 NOTE — TELEPHONE ENCOUNTER
Started 3 days ago, hurts during intercourse and or if you put pressure on the area  Fells like a sharp and pressure during intercourse  LMP 6/9  First time feeling this, only feels it in [address] position during intercourse  Will route to provider for additional insight and advisement

## 2023-06-20 NOTE — TELEPHONE ENCOUNTER
Sully is prov, Pt having Left side uterine pain for 3 days when applying pressure or having intercourse,  Please call pt to advise,  Thanks

## 2023-06-21 ENCOUNTER — OFFICE VISIT (OUTPATIENT)
Age: 23
End: 2023-06-21
Payer: COMMERCIAL

## 2023-06-21 VITALS
SYSTOLIC BLOOD PRESSURE: 98 MMHG | BODY MASS INDEX: 28.51 KG/M2 | HEIGHT: 61 IN | WEIGHT: 151 LBS | DIASTOLIC BLOOD PRESSURE: 60 MMHG

## 2023-06-21 DIAGNOSIS — N94.9 CMT (CERVICAL MOTION TENDERNESS): ICD-10-CM

## 2023-06-21 DIAGNOSIS — N94.10 DYSPAREUNIA, FEMALE: Primary | ICD-10-CM

## 2023-06-21 PROCEDURE — 81514 NFCT DS BV&VAGINITIS DNA ALG: CPT

## 2023-06-21 PROCEDURE — 87491 CHLMYD TRACH DNA AMP PROBE: CPT

## 2023-06-21 PROCEDURE — 87591 N.GONORRHOEAE DNA AMP PROB: CPT

## 2023-06-21 PROCEDURE — 99213 OFFICE O/P EST LOW 20 MIN: CPT

## 2023-06-21 NOTE — PROGRESS NOTES
Assessment/Plan:    CMT (cervical motion tenderness)  -plan for STD and infection screening    -advised to call if fever, chills, muscle aches, or abdominal pain develops  Diagnoses and all orders for this visit:    Dyspareunia, female  -     Cancel: Chlamydia/GC amplified DNA by PCR  -     Molecular Vaginal Panel  -     Chlamydia/GC amplified DNA by PCR    CMT (cervical motion tenderness)          Subjective:      Patient ID: Shila Benoit is a 25 y o  female presents for pelvic pain with intercourse that started 4-5 days ago  First noticed on Saturday last week, worse with insertion  She denies any abnormal vaginal bleeding or postcoital bleeding  She denies fevers, chills, abdominal/pelvic pain, abnormal vaginal discharge, itching, odor, irritation or urinary symptoms  She denies any new products recently or condom use  Patient's last menstrual period was 2023 (approximate)  Contraception POPs      The following portions of the patient's history were reviewed and updated as appropriate:   She  has a past medical history of Asthma, Depression, and Varicella  She   Patient Active Problem List    Diagnosis Date Noted   • Dyspareunia, female 2023   • CMT (cervical motion tenderness) 2023   • Encounter for annual routine gynecological examination 2023   • Retained products of conception following  11/15/2022   • Iron deficiency anemia 06/10/2022   • BMI 25 0-25 9,adult 06/10/2022   • History of depression 2021   • Mild intermittent asthma without complication      She  has no past surgical history on file  Her family history includes Asthma in her maternal grandmother and mother; Diabetes in her paternal grandmother; Hypertension in her maternal grandmother; No Known Problems in her brother, father, maternal grandfather, paternal grandfather, and sister  She  reports that she has never smoked   She has never used smokeless tobacco  She reports current "alcohol use  She reports that she does not currently use drugs  Current Outpatient Medications   Medication Sig Dispense Refill   • albuterol (PROVENTIL HFA,VENTOLIN HFA) 90 mcg/act inhaler Inhale 2 puffs every 6 (six) hours as needed for wheezing 6 7 g 1   • ketorolac (TORADOL) 10 mg tablet Take 1 tablet (10 mg total) by mouth every 6 (six) hours as needed for moderate pain 12 tablet 0   • loratadine (CLARITIN) 10 mg tablet Take 1 tablet (10 mg total) by mouth daily 90 tablet 0   • norethindrone (MICRONOR) 0 35 MG tablet TAKE 1 TABLET BY MOUTH EVERY DAY 84 tablet 4   • diazepam (VALIUM) 5 mg tablet Take 1 tablet (5 mg total) by mouth every 8 (eight) hours as needed for anxiety for up to 5 days (Patient not taking: Reported on 6/21/2023) 15 tablet 0   • ferrous sulfate 324 (65 Fe) mg TAKE 1 TABLET BY MOUTH DAILY BEFORE BREAKFAST (Patient not taking: Reported on 6/21/2023) 30 tablet 0   • Prenatal Vit-Fe Fumarate-FA (Prenatal Vitamin) 27-0 8 MG TABS Take by mouth (Patient not taking: Reported on 10/7/2022)       No current facility-administered medications for this visit  She has No Known Allergies       Review of Systems   Constitutional: Negative for chills and fever  Gastrointestinal: Negative for abdominal pain  Genitourinary: Positive for dyspareunia  Negative for dysuria, menstrual problem, pelvic pain, vaginal bleeding, vaginal discharge and vaginal pain  Musculoskeletal: Negative for back pain and myalgias  Skin: Negative for rash  Hematological: Negative for adenopathy  Psychiatric/Behavioral: Negative for confusion  Objective:      BP 98/60 (BP Location: Left arm, Patient Position: Sitting, Cuff Size: Standard)   Ht 5' 1\" (1 549 m)   Wt 68 5 kg (151 lb)   LMP 06/09/2023 (Approximate)   BMI 28 53 kg/m²          Physical Exam  Vitals and nursing note reviewed  Constitutional:       General: She is not in acute distress  Appearance: Normal appearance  She is not ill-appearing   " HENT:      Head: Normocephalic and atraumatic  Eyes:      Conjunctiva/sclera: Conjunctivae normal    Pulmonary:      Effort: Pulmonary effort is normal    Abdominal:      Palpations: Abdomen is soft  Tenderness: There is no abdominal tenderness  Genitourinary:     General: Normal vulva  Exam position: Lithotomy position  Labia:         Right: No rash, tenderness or lesion  Left: No rash, tenderness or lesion  Vagina: No signs of injury and foreign body  Vaginal discharge (large amount of white discharge present) present  No erythema, tenderness, bleeding, lesions or prolapsed vaginal walls  Cervix: Cervical motion tenderness present  No discharge, friability, lesion, erythema, cervical bleeding or eversion  Uterus: Tender  Not deviated, not enlarged, not fixed and no uterine prolapse  Adnexa:         Right: No mass, tenderness or fullness  Left: No mass, tenderness or fullness  Musculoskeletal:         General: Normal range of motion  Cervical back: Neck supple  Lymphadenopathy:      Lower Body: No right inguinal adenopathy  No left inguinal adenopathy  Skin:     General: Skin is warm and dry  Neurological:      General: No focal deficit present  Mental Status: She is alert     Psychiatric:         Mood and Affect: Mood normal          Behavior: Behavior normal

## 2023-06-21 NOTE — ASSESSMENT & PLAN NOTE
-plan for STD and infection screening    -advised to call if fever, chills, muscle aches, or abdominal pain develops

## 2023-06-21 NOTE — PATIENT INSTRUCTIONS
Dyspareunia in Women   AMBULATORY CARE:   Dyspareunia  is pain during intercourse (sex)  You may have pain before, during, or after sex  You may have pain every time you have sex, or only certain times  You may have had pain since the first time you had sex, or the pain might start suddenly  Dyspareunia may cause you to feel embarrassed or cause problems in your relationship with your partner  Many causes of dyspareunia can be treated  It is important for you to talk with your healthcare provider about your symptoms  Common signs and symptoms:  Signs and symptoms will depend on the cause  You may have any of the following:  Pain anywhere from the opening of your vagina to your abdomen    Pain with penetration, including when you insert a sex toy    A feeling of pressure or burning anywhere in your vagina    Less interest in having sex, trouble becoming aroused, or trouble having an orgasm    A watery discharge from your vagina    Contact your healthcare provider if:   You have new or worsening symptoms  You have questions or concerns about your condition or care  Treatment  depends on the cause of your symptoms  You may need any of the following:  Medicine  may be given to treat an infection or to relieve pain  Pain medicine may be given as a pill or as a cream you can apply to your vagina  Estrogen  may be given as a cream, a pill, or a ring that fits in your vagina  Estrogen may help relieve your symptoms if they are caused by low estrogen levels  Lubricant  can help make sex more comfortable  Lubricants are available without a prescription  Talk to your healthcare provider about which kind to use if you are using condoms for birth control  Oil-based lubricants can damage condoms  Choose a silicone-based or water-based lubricant  Surgery  may be needed to remove a tumor or fibroid  Surgery can also be used to remove extra tissue from your vagina, or to widen your vagina   You may need surgery to fix a problem with a structure in your lower abdomen  Manage dyspareunia:   A sitz bath  may help reduce inflammation  To make a sitz bath, fill the bathtub with warm water until it is at about the level of your belly button  Stay in the bath for about 15 to 20 minutes  A sitz bath is also available as a small tub that will fit under your toilet seat  You will fill the tub with water as directed once it is under the toilet seat  Your healthcare provider can tell you how often to use a sitz bath  Kegel exercises  may be recommended to help strengthen your pelvic muscles  Pelvic muscles hold your pelvic organs, such as your bladder and uterus, in place  To do Kegel exercises, tighten your pelvic muscles slowly  It should feel like you are trying to hold back urine  Hold these muscles and count to 3  Relax, tighten them quickly, and release  Repeat the cycle 10 times  A change of position  can help make sex more comfortable  You might also want to try having sex at different times of the month if you have monthly periods  This will help you find a time of the month that is most comfortable for you  Therapy  with a mental health counselor may help you feel less anxious about sex  You can talk to a counselor by yourself or with your partner  Follow up with your healthcare provider as directed:  Write down your questions so you remember to ask them during your visits  © Copyright [employer] 2022 Information is for End User's use only and may not be sold, redistributed or otherwise used for commercial purposes  The above information is an  only  It is not intended as medical advice for individual conditions or treatments  Talk to your doctor, nurse or pharmacist before following any medical regimen to see if it is safe and effective for you

## 2023-06-22 LAB
C GLABRATA DNA VAG QL NAA+PROBE: NEGATIVE
C KRUSEI DNA VAG QL NAA+PROBE: NEGATIVE
C TRACH DNA SPEC QL NAA+PROBE: NEGATIVE
CANDIDA SP 6 PNL VAG NAA+PROBE: POSITIVE
N GONORRHOEA DNA SPEC QL NAA+PROBE: NEGATIVE
T VAGINALIS DNA VAG QL NAA+PROBE: NEGATIVE
VAGINOSIS/ITIS DNA PNL VAG PROBE+SIG AMP: POSITIVE

## 2023-06-24 DIAGNOSIS — B37.9 YEAST INFECTION: ICD-10-CM

## 2023-06-24 DIAGNOSIS — B96.89 BV (BACTERIAL VAGINOSIS): Primary | ICD-10-CM

## 2023-06-24 DIAGNOSIS — N76.0 BV (BACTERIAL VAGINOSIS): Primary | ICD-10-CM

## 2023-06-24 RX ORDER — FLUCONAZOLE 150 MG/1
150 TABLET ORAL ONCE
Qty: 1 TABLET | Refills: 0 | Status: SHIPPED | OUTPATIENT
Start: 2023-06-24 | End: 2023-06-24

## 2023-06-24 RX ORDER — METRONIDAZOLE 500 MG/1
500 TABLET ORAL EVERY 12 HOURS SCHEDULED
Qty: 14 TABLET | Refills: 0 | Status: SHIPPED | OUTPATIENT
Start: 2023-06-24 | End: 2023-07-01

## 2023-08-17 DIAGNOSIS — B96.89 BV (BACTERIAL VAGINOSIS): Primary | ICD-10-CM

## 2023-08-17 DIAGNOSIS — N76.0 BV (BACTERIAL VAGINOSIS): Primary | ICD-10-CM

## 2023-08-17 NOTE — TELEPHONE ENCOUNTER
----- Message from Samantha Guzman, [address] [facility] sent at 8/17/2023 12:41 PM EDT -----  Regarding: FW: Positive BV   Contact: [phone]  Ok to refill BV Metrogel   ----- Message -----  From: Jennifer Girard  Sent: 8/17/2023  11:46 AM EDT  To: ANGIE Oneil  Subject: FW: Positive BV                                  Sully is out based on her notes- you think of to refill? She has an annual coming up on 8/29 with her also.      ----- Message -----  From: Grabiel Pearl  Sent: 8/17/2023  11:25 AM EDT  To: [facility]  Subject: Positive BV                                      Hey, it’s back again. Can you prescribe me antibiotics ? Also, can we schedule an office visit so we can do that test, I was inquiring about.  Thanks

## 2023-08-18 RX ORDER — METRONIDAZOLE 7.5 MG/G
1 GEL VAGINAL
Qty: 5 G | Refills: 0 | Status: SHIPPED | OUTPATIENT
Start: 2023-08-18 | End: 2023-08-23

## 2023-08-25 ENCOUNTER — TELEPHONE (OUTPATIENT)
Dept: OBGYN CLINIC | Facility: CLINIC | Age: 23
End: 2023-08-25

## 2023-08-25 ENCOUNTER — APPOINTMENT (EMERGENCY)
Dept: ULTRASOUND IMAGING | Facility: HOSPITAL | Age: 23
End: 2023-08-25
Payer: COMMERCIAL

## 2023-08-25 ENCOUNTER — HOSPITAL ENCOUNTER (EMERGENCY)
Facility: HOSPITAL | Age: 23
Discharge: HOME | End: 2023-08-25
Attending: EMERGENCY MEDICINE
Payer: COMMERCIAL

## 2023-08-25 VITALS
HEIGHT: 61 IN | SYSTOLIC BLOOD PRESSURE: 120 MMHG | HEART RATE: 88 BPM | RESPIRATION RATE: 18 BRPM | BODY MASS INDEX: 29.27 KG/M2 | WEIGHT: 155 LBS | DIASTOLIC BLOOD PRESSURE: 67 MMHG | TEMPERATURE: 98.2 F | OXYGEN SATURATION: 98 %

## 2023-08-25 DIAGNOSIS — O20.0 THREATENED MISCARRIAGE IN EARLY PREGNANCY: Primary | ICD-10-CM

## 2023-08-25 DIAGNOSIS — N39.0 UTI (URINARY TRACT INFECTION): ICD-10-CM

## 2023-08-25 DIAGNOSIS — N93.9 VAGINAL BLEEDING: ICD-10-CM

## 2023-08-25 DIAGNOSIS — Z34.90 INTRAUTERINE PREGNANCY: ICD-10-CM

## 2023-08-25 LAB
B-HCG SERPL-ACNC: 4726 MIU/ML (ref 0–5)
BACTERIA UR QL AUTO: ABNORMAL /HPF
BILIRUB UR QL STRIP: ABNORMAL
CLARITY UR: CLEAR
COLOR UR: ABNORMAL
EXT PREGNANCY TEST URINE: POSITIVE
EXT. CONTROL: ABNORMAL
GLUCOSE UR STRIP-MCNC: NEGATIVE MG/DL
HGB UR QL STRIP.AUTO: ABNORMAL
KETONES UR STRIP-MCNC: ABNORMAL MG/DL
LEUKOCYTE ESTERASE UR QL STRIP: ABNORMAL
NITRITE UR QL STRIP: POSITIVE
NON-SQ EPI CELLS URNS QL MICRO: ABNORMAL /HPF
PH UR STRIP.AUTO: 6.5 [PH]
PROT UR STRIP-MCNC: >=300 MG/DL
RBC #/AREA URNS AUTO: ABNORMAL /HPF
SP GR UR STRIP.AUTO: 1.01 (ref 1–1.03)
UROBILINOGEN UR QL STRIP.AUTO: 0.2 E.U./DL
WBC #/AREA URNS AUTO: ABNORMAL /HPF
WBC CLUMPS # UR AUTO: PRESENT /UL

## 2023-08-25 PROCEDURE — 81025 URINE PREGNANCY TEST: CPT | Performed by: EMERGENCY MEDICINE

## 2023-08-25 PROCEDURE — 99284 EMERGENCY DEPT VISIT MOD MDM: CPT | Performed by: EMERGENCY MEDICINE

## 2023-08-25 PROCEDURE — 87086 URINE CULTURE/COLONY COUNT: CPT | Performed by: EMERGENCY MEDICINE

## 2023-08-25 PROCEDURE — 36415 COLL VENOUS BLD VENIPUNCTURE: CPT | Performed by: EMERGENCY MEDICINE

## 2023-08-25 PROCEDURE — 81001 URINALYSIS AUTO W/SCOPE: CPT | Performed by: EMERGENCY MEDICINE

## 2023-08-25 PROCEDURE — 76801 OB US < 14 WKS SINGLE FETUS: CPT

## 2023-08-25 PROCEDURE — 87147 CULTURE TYPE IMMUNOLOGIC: CPT | Performed by: EMERGENCY MEDICINE

## 2023-08-25 PROCEDURE — 99284 EMERGENCY DEPT VISIT MOD MDM: CPT

## 2023-08-25 PROCEDURE — 84702 CHORIONIC GONADOTROPIN TEST: CPT | Performed by: EMERGENCY MEDICINE

## 2023-08-25 RX ORDER — CEPHALEXIN 500 MG/1
500 CAPSULE ORAL EVERY 6 HOURS SCHEDULED
Qty: 28 CAPSULE | Refills: 0 | Status: SHIPPED | OUTPATIENT
Start: 2023-08-25 | End: 2023-09-01

## 2023-08-25 RX ORDER — CEPHALEXIN 250 MG/1
500 CAPSULE ORAL ONCE
Status: COMPLETED | OUTPATIENT
Start: 2023-08-25 | End: 2023-08-25

## 2023-08-25 RX ADMIN — CEPHALEXIN 500 MG: 250 CAPSULE ORAL at 22:23

## 2023-08-25 NOTE — ED PROVIDER NOTES
History  Chief Complaint   Patient presents with   • Vaginal Bleeding - Pregnant     Pt presents ambulatory c/o "heavy vaginal bleeding and lower abdominal cramping  since this morning. Pt reports being 7 wks pregnant."     41-year-old female history of asthma  presenting with trimester vaginal bleeding. Patient reports last menstrual period beginning July 10 with a positive home pregnancy test a few weeks ago. Patient with light vaginal bleeding for the past few days now with heavy darker bleeding this morning associated with mild lower abdominal cramping. Denies any urinary changes. Denies any nausea vomiting diarrhea. Denies any chest pain shortness of breath. Denies any other complaints. Chart reviewed. Past Medical History:  No date: Asthma      Comment:  albuterol prn  No date: Depression      Comment:  intermittent   no meds  No date: Varicella      Comment:  had vaccines  Family History: non-contributory  Social History            Prior to Admission Medications   Prescriptions Last Dose Informant Patient Reported? Taking?    Prenatal Vit-Fe Fumarate-FA (Prenatal Vitamin) 27-0.8 MG TABS  Self Yes No   Sig: Take by mouth   Patient not taking: Reported on 10/7/2022   albuterol (PROVENTIL HFA,VENTOLIN HFA) 90 mcg/act inhaler  Self No No   Sig: Inhale 2 puffs every 6 (six) hours as needed for wheezing   diazepam (VALIUM) 5 mg tablet   No No   Sig: Take 1 tablet (5 mg total) by mouth every 8 (eight) hours as needed for anxiety for up to 5 days   Patient not taking: Reported on 2023   ferrous sulfate 324 (65 Fe) mg  Self No No   Sig: TAKE 1 TABLET BY MOUTH DAILY BEFORE BREAKFAST   Patient not taking: Reported on 2023   ketorolac (TORADOL) 10 mg tablet   No No   Sig: Take 1 tablet (10 mg total) by mouth every 6 (six) hours as needed for moderate pain   loratadine (CLARITIN) 10 mg tablet   No No   Sig: Take 1 tablet (10 mg total) by mouth daily   norethindrone (MICRONOR) 0.35 MG tablet   No No Sig: TAKE 1 TABLET BY MOUTH EVERY DAY      Facility-Administered Medications: None       Past Medical History:   Diagnosis Date   • Asthma     albuterol prn   • Depression     intermittent   no meds   • Varicella     had vaccines       History reviewed. No pertinent surgical history. Family History   Problem Relation Age of Onset   • Asthma Mother    • No Known Problems Father    • No Known Problems Sister    • No Known Problems Brother    • Asthma Maternal Grandmother    • Hypertension Maternal Grandmother    • No Known Problems Maternal Grandfather    • Diabetes Paternal Grandmother    • No Known Problems Paternal Grandfather    • Ovarian cancer Neg Hx    • Cervical cancer Neg Hx    • Uterine cancer Neg Hx    • Colon cancer Neg Hx    • Breast cancer Neg Hx      I have reviewed and agree with the history as documented. E-Cigarette/Vaping   • E-Cigarette Use Never User      E-Cigarette/Vaping Substances   • Nicotine No    • THC No    • CBD No    • Flavoring No    • Other No    • Unknown No      Social History     Tobacco Use   • Smoking status: Never   • Smokeless tobacco: Never   Vaping Use   • Vaping Use: Never used   Substance Use Topics   • Alcohol use: Yes     Comment: occasionally   • Drug use: Not Currently       Review of Systems   Constitutional: Negative for appetite change, chills, diaphoresis, fever and unexpected weight change. HENT: Negative for congestion and rhinorrhea. Eyes: Negative for photophobia and visual disturbance. Respiratory: Negative for cough, chest tightness and shortness of breath. Cardiovascular: Negative for chest pain, palpitations and leg swelling. Gastrointestinal: Positive for abdominal pain. Negative for abdominal distention, blood in stool, constipation, diarrhea, nausea and vomiting. Genitourinary: Positive for vaginal bleeding. Negative for dysuria, hematuria and vaginal discharge.    Musculoskeletal: Negative for back pain, joint swelling, neck pain and neck stiffness. Skin: Negative for color change, pallor, rash and wound. Neurological: Negative for dizziness, syncope, weakness, light-headedness and headaches. Psychiatric/Behavioral: Negative for agitation. All other systems reviewed and are negative. Physical Exam  Physical Exam  Vitals and nursing note reviewed. Constitutional:       General: She is not in acute distress. Appearance: Normal appearance. She is well-developed. She is not ill-appearing, toxic-appearing or diaphoretic. HENT:      Head: Normocephalic and atraumatic. Nose: Nose normal. No congestion or rhinorrhea. Mouth/Throat:      Mouth: Mucous membranes are moist.      Pharynx: Oropharynx is clear. No oropharyngeal exudate or posterior oropharyngeal erythema. Eyes:      General: No scleral icterus. Right eye: No discharge. Left eye: No discharge. Extraocular Movements: Extraocular movements intact. Conjunctiva/sclera: Conjunctivae normal.      Pupils: Pupils are equal, round, and reactive to light. Neck:      Vascular: No JVD. Trachea: No tracheal deviation. Comments: Supple. Normal range of motion. Cardiovascular:      Rate and Rhythm: Normal rate and regular rhythm. Heart sounds: Normal heart sounds. No murmur heard. No friction rub. No gallop. Comments: Normal rate and regular rhythm  Pulmonary:      Effort: Pulmonary effort is normal. No respiratory distress. Breath sounds: Normal breath sounds. No stridor. No wheezing or rales. Comments: Clear to auscultation bilaterally  Chest:      Chest wall: No tenderness. Abdominal:      General: Bowel sounds are normal. There is no distension. Palpations: Abdomen is soft. Tenderness: There is no abdominal tenderness. There is no right CVA tenderness, left CVA tenderness, guarding or rebound. Comments: Soft, nontender, nondistended.   Normal bowel sounds throughout   Musculoskeletal: General: No swelling, tenderness, deformity or signs of injury. Normal range of motion. Cervical back: Normal range of motion and neck supple. No rigidity. No muscular tenderness. Right lower leg: No edema. Left lower leg: No edema. Lymphadenopathy:      Cervical: No cervical adenopathy. Skin:     General: Skin is warm and dry. Coloration: Skin is not pale. Findings: No erythema or rash. Neurological:      General: No focal deficit present. Mental Status: She is alert. Mental status is at baseline. Sensory: No sensory deficit. Motor: No weakness or abnormal muscle tone. Coordination: Coordination normal.      Gait: Gait normal.      Comments: Alert. Strength and sensation grossly intact. Ambulatory without difficulty at baseline. Psychiatric:         Behavior: Behavior normal.         Thought Content:  Thought content normal.         Vital Signs  ED Triage Vitals [08/25/23 1857]   Temperature Pulse Respirations Blood Pressure SpO2   98.2 °F (36.8 °C) 78 18 124/63 100 %      Temp Source Heart Rate Source Patient Position - Orthostatic VS BP Location FiO2 (%)   Oral Monitor -- -- --      Pain Score       --           Vitals:    08/25/23 1857 08/25/23 1900   BP: 124/63 122/60   Pulse: 78 90         Visual Acuity      ED Medications  Medications   cephalexin (KEFLEX) capsule 500 mg (has no administration in time range)       Diagnostic Studies  Results Reviewed     Procedure Component Value Units Date/Time    Urine Microscopic [[id number]]  (Abnormal) Collected: 08/25/23 2036    Lab Status: Final result Specimen: Urine, Clean Catch Updated: 08/25/23 2058     RBC, UA Innumerable /hpf      WBC, UA 30-50 /hpf      Epithelial Cells Innumerable /hpf      Bacteria, UA Innumerable /hpf      WBC Clumps Present     URINE COMMENT --    hCG, quantitative [[id number]]  (Abnormal) Collected: 08/25/23 1951    Lab Status: Final result Specimen: Blood from Arm, Left Updated: 08/25/23 2046     HCG, Quant 4,726 mIU/mL     Narrative:       Expected Ranges:    HCG results between 5 and 25 mIU/mL may be indicative of early pregnancy but should be interpreted in light of the total clinical presentation. HCG can rise to detectable levels in danni and post menopausal women (0-11.6 mIU/mL). Approximate               Approximate HCG  Gestation age          Concentration ( mIU/mL)  _____________          ______________________   Pérez Karyna                      HCG values  0.2-1                       5-50  1-2                           2-3                         100-5000  3-4                         500-60119  4-5                         1000-04797  5-6                         13670-359714  6-8                         69647-696051  8-12                        65443-402914      UA w Reflex to Microscopic w Reflex to Culture [[id number]]  (Abnormal) Collected: 08/25/23 2036    Lab Status: Final result Specimen: Urine, Clean Catch Updated: 08/25/23 2044     Color, UA Red     Clarity, UA Clear     Specific Gravity, UA 1.010     pH, UA 6.5     Leukocytes, UA Large     Nitrite, UA Positive     Protein, UA >=300 mg/dl      Glucose, UA Negative mg/dl      Ketones, UA Trace mg/dl      Urobilinogen, UA 0.2 E.U./dl      Bilirubin, UA Small     Occult Blood, UA Large     URINE COMMENT --    Urine culture [[id number]] Collected: 08/25/23 2036    Lab Status: In process Specimen: Urine, Clean Catch Updated: 08/25/23 2044    POCT pregnancy, urine [[id number]]  (Abnormal) Resulted: 08/25/23 2039    Lab Status: Final result Updated: 08/25/23 2040     EXT Preg Test, Ur Positive     Control Valid                 US OB < 14 weeks with transvaginal   Final Result by Sunshine Monique MD (08/25 2156)      Single live intrauterine gestation at 5 weeks 6 days (range +/- 4). PEPE of 04/20/2024.          Workstation performed: [id number]                    Procedures  Procedures         ED Course                               SBIRT 22yo+    Flowsheet Row Most Recent Value   Initial Alcohol Screen: US AUDIT-C     1. How often do you have a drink containing alcohol? 0 Filed at: 2023   2. How many drinks containing alcohol do you have on a typical day you are drinking? 0 Filed at: 2023   3b. FEMALE Any Age, or MALE 65+: How often do you have 4 or more drinks on one occassion? 0 Filed at: 2023   Audit-C Score 0 Filed at: 2023   LORENZA: How many times in the past year have you. .. Used an illegal drug or used a prescription medication for non-medical reasons? Never Filed at: 2023                    Medical Decision Making  30-year-old female history of asthma  presenting with trimester vaginal bleeding. Reported home positive pregnancy test approximately 7 weeks pregnant with vaginal bleeding abdominal cramping concerning for possible miscarriage. Plan for bedside ultrasound and hCG quant and urine testing. Patient declining Tylenol. Reassess. Labs interpreted me notable for hCG in the 4000's. Unable to visualize bedside ultrasound. Transvaginal ultrasound demonstrating single IUP with heart rate 110. UA concerning for possible infection. Will treat with antibiotics. Given dose in ED and prescription sent to pharmacy. Patient already has follow-up appointment with OB. Discussed results and recommendations. Advised follow up PCP OB. Medication recommendations. Given instructions and return precautions. Patient/family at bedside acknowledged understanding of all written and verbal instructions and return precautions. Discharged. Amount and/or Complexity of Data Reviewed  Labs: ordered. Radiology: ordered. Risk  Prescription drug management.           Disposition  Final diagnoses:   Vaginal bleeding   Threatened miscarriage in early pregnancy   UTI (urinary tract infection)   Intrauterine pregnancy     Time reflects when diagnosis was documented in both MDM as applicable and the Disposition within this note     Time User Action Codes Description Comment    8/25/2023  7:12 PM Jovany Carwin Add [N93.9] Vaginal bleeding     8/25/2023  9:57 PM Jovany Carwin Add [O20.0] Threatened miscarriage in early pregnancy     8/25/2023  9:57 PM Hollis Carwin Modify [N93.9] Vaginal bleeding     8/25/2023  9:57 PM Jovany Carwin Modify [O20.0] Threatened miscarriage in early pregnancy     8/25/2023  9:57 PM Jovany Carwin Add [N39.0] UTI (urinary tract infection)     8/25/2023  9:59 PM Hollis Carwin Add [Z34.90] Intrauterine pregnancy       ED Disposition     ED Disposition   Discharge    Condition   Stable    Date/Time   Fri Aug 25, 2023  9:58 PM    Comment   Trinidadbhupinder Shae discharge to home/self care. Follow-up Information     Follow up With Specialties Details Why Contact Info Additional Information    [facility]  Call  for PCP [phone] [facility] Schedule an appointment as soon as possible for a visit in 3 days  [facility] [address] [place], [address] [place], [place], [address] [facility]          Patient's Medications   Discharge Prescriptions    CEPHALEXIN (KEFLEX) 500 MG CAPSULE    Take 1 capsule (500 mg total) by mouth every 6 (six) hours for 7 days       Start Date: 8/25/2023 End Date: 9/1/2023       Order Dose: 500 mg       Quantity: 28 capsule    Refills: 0       Outpatient Discharge Orders   hCG, quantitative   Standing Status: Future Standing Exp.  Date: 08/25/24       PDMP Review     None          ED Provider  Electronically Signed by           Inge Casillas MD  08/25/23 5954

## 2023-08-25 NOTE — Clinical Note
Warden Dolan was seen and treated in our emergency department on 8/25/2023. Diagnosis:     Vonda Castellanos  is off the rest of the shift today. She may return on this date: 08/29/2023         If you have any questions or concerns, please don't hesitate to call.       Myrtle Mathur RN    ______________________________           _______________          _______________  Hospital Representative                              Date                                Time

## 2023-08-25 NOTE — TELEPHONE ENCOUNTER
Pt is 6 wks,   Her early us is scheduled for 9/14 and was spotting now bleeding on and off for 2 days,  Please call pt to advise,  Thanks

## 2023-08-25 NOTE — TELEPHONE ENCOUNTER
No recent intercourse, Patient advised if spotting can monitor for now, but if bleeding gets heavier and or cramping to call will have to go to er

## 2023-08-26 NOTE — DISCHARGE INSTRUCTIONS
Please follow up PCP and gyn. Recommend tylenol 650 mg every 6 hours as needed for pain. Please return for severe chest pain, significant shortness of breath, severely worsening symptoms, or any other concerning signs or symptoms. Please refer to the following documents for additional instructions and return precautions.

## 2023-08-27 LAB — BACTERIA UR CULT: ABNORMAL

## 2023-08-28 ENCOUNTER — TELEPHONE (OUTPATIENT)
Dept: OBGYN CLINIC | Facility: CLINIC | Age: 23
End: 2023-08-28

## 2023-08-28 ENCOUNTER — APPOINTMENT (OUTPATIENT)
Dept: LAB | Facility: CLINIC | Age: 23
End: 2023-08-28
Payer: COMMERCIAL

## 2023-08-28 DIAGNOSIS — O20.0 THREATENED MISCARRIAGE IN EARLY PREGNANCY: ICD-10-CM

## 2023-08-28 LAB — B-HCG SERPL-ACNC: 3074 MIU/ML (ref 0–5)

## 2023-08-28 PROCEDURE — 36415 COLL VENOUS BLD VENIPUNCTURE: CPT

## 2023-08-28 PROCEDURE — 84702 CHORIONIC GONADOTROPIN TEST: CPT

## 2023-08-28 NOTE — TELEPHONE ENCOUNTER
Spoke to patient and yesterday passed clots and tissue- no cramps/pain. A+     Doing HCG now. Bleeding last 2 days it was heavy- not as bad now. Advised patient is m/c we can typically repeat labs wkly to follow til zero. We will see what this one says today and go from there to see waht she needs though moving frwd.

## 2023-09-05 ENCOUNTER — TELEPHONE (OUTPATIENT)
Dept: OBGYN CLINIC | Facility: CLINIC | Age: 23
End: 2023-09-05

## 2023-09-05 DIAGNOSIS — O20.0 THREATENED ABORTION: ICD-10-CM

## 2023-09-05 DIAGNOSIS — O03.9 MISCARRIAGE: Primary | ICD-10-CM

## 2023-09-05 DIAGNOSIS — O20.9 VAGINAL BLEEDING AFFECTING EARLY PREGNANCY: ICD-10-CM

## 2023-09-05 NOTE — TELEPHONE ENCOUNTER
Pt called in regards to follow-up from miscarriage. Pt states that she went to the ER last week and they told her she was having a miscarriage. Pt is scheduled with our office for her early u/s next week on 9/14. Pt would like to know if she should keep that appointment. Pt is still experiencing vaginal bleeding.  Please advise

## 2023-09-05 NOTE — TELEPHONE ENCOUNTER
Spoke with nalini in office regarding advice for this patient- advised to keep appointment and to go for HCG levels weekly until her appointment with Purvi Pastor. Left message for patient. Advised to keep 9/14 visit for follow up HCG labs ordered for today and again next week to follow trend down to 0. Given patient is still bleeding appointment is necessary to see if there is any other management needed.

## 2023-09-06 ENCOUNTER — TELEPHONE (OUTPATIENT)
Facility: HOSPITAL | Age: 23
End: 2023-09-06

## 2023-09-06 NOTE — TELEPHONE ENCOUNTER
Per my chart review there was a live IUP on 8/25 with [facility]. i don't see any other ED/ER visits since then. So I don't know that is miscarrying. Her HCG's however did decrease which is not ideal.   Please move up her US appt since she is still bleeding.

## 2023-09-06 NOTE — TELEPHONE ENCOUNTER
Left message for patient with central scheduling phone number and recommendation to get formal US thru radiology per Jovita Watters

## 2023-09-06 NOTE — TELEPHONE ENCOUNTER
Per Jono Quesada patient had [address] [address] at [address] in ER 8/25      left message to call back 9/6     patient needs US appointment sooner than 9/14

## 2023-09-06 NOTE — TELEPHONE ENCOUNTER
Patient had no bleeding the day before and then last night Filling a pad every hour or two and had clots last night. No cramping. So far today patient has not gone to the bathroom to check her bleeding. Passed material on 8/27.      No appointments for earlier US-

## 2023-09-06 NOTE — TELEPHONE ENCOUNTER
Spoke w/Adelso from central scheduling to call PT to set up radiology u/s. Suni Currie stated she would call PT to schedule.

## 2023-09-07 ENCOUNTER — APPOINTMENT (OUTPATIENT)
Dept: LAB | Facility: HOSPITAL | Age: 23
End: 2023-09-07
Payer: COMMERCIAL

## 2023-09-07 ENCOUNTER — HOSPITAL ENCOUNTER (OUTPATIENT)
Dept: ULTRASOUND IMAGING | Facility: HOSPITAL | Age: 23
End: 2023-09-07
Payer: COMMERCIAL

## 2023-09-07 DIAGNOSIS — O20.0 THREATENED ABORTION: ICD-10-CM

## 2023-09-07 DIAGNOSIS — O20.9 VAGINAL BLEEDING AFFECTING EARLY PREGNANCY: ICD-10-CM

## 2023-09-07 DIAGNOSIS — O02.1 MISSED AB: Primary | ICD-10-CM

## 2023-09-07 LAB — B-HCG SERPL-ACNC: 2265 MIU/ML (ref 0–5)

## 2023-09-07 PROCEDURE — 84702 CHORIONIC GONADOTROPIN TEST: CPT

## 2023-09-07 PROCEDURE — 76801 OB US < 14 WKS SINGLE FETUS: CPT

## 2023-09-07 PROCEDURE — 36415 COLL VENOUS BLD VENIPUNCTURE: CPT

## 2023-09-07 RX ORDER — ONDANSETRON 4 MG/1
4 TABLET, FILM COATED ORAL EVERY 8 HOURS PRN
Qty: 20 TABLET | Refills: 0 | Status: SHIPPED | OUTPATIENT
Start: 2023-09-07

## 2023-09-07 RX ORDER — MISOPROSTOL 200 UG/1
600 TABLET ORAL ONCE
Qty: 3 TABLET | Refills: 0 | Status: SHIPPED | OUTPATIENT
Start: 2023-09-07 | End: 2023-09-07

## 2023-09-07 RX ORDER — IBUPROFEN 600 MG/1
600 TABLET ORAL EVERY 6 HOURS PRN
Qty: 60 TABLET | Refills: 1 | Status: SHIPPED | OUTPATIENT
Start: 2023-09-07

## 2023-09-07 NOTE — RESULT ENCOUNTER NOTE
Call placed to pt to review results. Failed pregnancy  No [facility] seen- previously + on 8/25  Discussed management for missed ab including   expectant management/observation to see if her body will expel POC  Medical mngment with cytotec  Surgical mngment with D&E. Pt would like to proceed with medical management  Common side effects reviewed and additional Motrin and zofran sent with order for cytotec  Pt has an appt scheduled for 9/14- was her original dating US appt. Will keep for now-pt to reach out the day before with update on how she is doing,   Precautions for excess bldg, pain reviewed.

## 2023-09-11 ENCOUNTER — TELEPHONE (OUTPATIENT)
Dept: OBGYN CLINIC | Facility: CLINIC | Age: 23
End: 2023-09-11

## 2023-09-11 NOTE — TELEPHONE ENCOUNTER
Pt is requesting a letter to excuse her from work for Saturday 9/9 the medication she was given made her nausea and she did not go in to work. Please advise.

## 2023-09-11 NOTE — LETTER
?September 11, 2023? Anirudh Segovia  [address] [facility], [address] [place] [address]        To Whom it May Concern:    Please excuse Wendysharon Keith from work on 9/9/2023, she is currently under our professional care. If you have any questions please contact our office at [phone].       Sincerely,    Rene Albarran

## 2023-09-14 ENCOUNTER — OFFICE VISIT (OUTPATIENT)
Dept: OBGYN CLINIC | Facility: MEDICAL CENTER | Age: 23
End: 2023-09-14

## 2023-09-14 VITALS
WEIGHT: 153 LBS | TEMPERATURE: 80 F | SYSTOLIC BLOOD PRESSURE: 108 MMHG | HEIGHT: 61 IN | DIASTOLIC BLOOD PRESSURE: 64 MMHG | BODY MASS INDEX: 28.89 KG/M2

## 2023-09-14 DIAGNOSIS — O02.1 MISSED AB: Primary | ICD-10-CM

## 2023-09-14 PROBLEM — O03.4 RETAINED PRODUCTS OF CONCEPTION FOLLOWING ABORTION: Status: RESOLVED | Noted: 2022-11-15 | Resolved: 2023-09-14

## 2023-09-14 PROCEDURE — 88305 TISSUE EXAM BY PATHOLOGIST: CPT | Performed by: STUDENT IN AN ORGANIZED HEALTH CARE EDUCATION/TRAINING PROGRAM

## 2023-09-14 NOTE — PROGRESS NOTES
Assessment/Plan:       1. Missed ab  Assessment & Plan:  Pt s/p medical management with cytotec for missed ab this past weekend. Exam today, Cervix is open - 1+ cm  Clot/ tissue at cervical os. Removed with forceps. Doesn't appear to be just a blood clot. Some yellow tan tissue noted. Sent to pathology for presumed POC. Informal bedside US done with ET 0.8cm in sag plane and 1.32 cm in trv plane, with hypoechoic fluid collection. Color flow demonstrating some vascuarity. Concerned for retained POC vs blood clots. Recommended consult with physician to discuss possible D&C. Precautions reviewed to call for- heavy bldg or increased pain/cramping      Orders:  -     Tissue Exam          Subjective:      Patient ID: Eleuterio Ozuna is a 25 y.o. female. She is here for Follow-up (Miscarriage 8/28/Patient is still bleeding some days heavy other days not at all )    HPI  Pt with recent missed ab w/ medical management w/ cytotec and here for f/u  Since medication use she did have a lot of increased bldg with clots- lasted through Sunday. Bldg is mostly light but yesterday did  for a few hours again and has slowed down. No c/o significant cramping with this. No significant dizziness or lightheadedness. Menstrual History:  Patient's last menstrual period was 06/09/2023 (approximate). The following portions of the patient's history were reviewed and updated as appropriate: allergies, current medications, past family history, past medical history, past social history, past surgical history, and problem list.    Review of Systems  See HPI for pertinent positives          Objective:    /64 (BP Location: Left arm, Patient Position: Sitting, Cuff Size: Standard)   Temp (!) 80 °F (26.7 °C)   Ht 5' 1" (1.549 m)   Wt 69.4 kg (153 lb)   LMP 06/09/2023 (Approximate)   Breastfeeding Unknown   BMI 28.91 kg/m²      Physical Exam  Constitutional:       General: She is not in acute distress. Appearance: Normal appearance. Genitourinary:      Urethral meatus normal.      No lesions in the vagina. Right Labia: No rash, lesions or skin changes. Left Labia: No lesions, skin changes or rash. Vaginal discharge and bleeding (small amt dark red blood noted. no brisk bleeding noted) present. No vaginal erythema, tenderness or ulceration. Right Adnexa: not tender, not full and no mass present. Left Adnexa: not tender, not full and no mass present. No cervical motion tenderness, discharge, friability or lesion. Cervical exam comments: Cervix is open - 1+ cm  Clot/ tissue at cervical os. Removed with forceps. Doesn't appear to be just a blood clot. Some yellow tan tissue noted. Hannah Sevin Uterus is not enlarged or tender. Bladder is not tender. Pelvic exam was performed with patient in the lithotomy position. Rectum:      No external hemorrhoid. HENT:      Head: Normocephalic. Cardiovascular:      Rate and Rhythm: Normal rate. Pulmonary:      Effort: Pulmonary effort is normal.   Neurological:      Mental Status: She is alert and oriented to person, place, and time. Psychiatric:         Mood and Affect: Mood normal.         Behavior: Behavior normal.   Vitals reviewed.

## 2023-09-14 NOTE — ASSESSMENT & PLAN NOTE
Pt s/p medical management with cytotec for missed ab this past weekend. Exam today, Cervix is open - 1+ cm  Clot/ tissue at cervical os. Removed with forceps. Doesn't appear to be just a blood clot. Some yellow tan tissue noted. Sent to pathology for presumed POC. Informal bedside US done with ET 0.8cm in sag plane and 1.32 cm in trv plane, with hypoechoic fluid collection. Color flow demonstrating some vascuarity. Concerned for retained POC vs blood clots. Recommended consult with physician to discuss possible D&C.  Precautions reviewed to call for- heavy bldg or increased pain/cramping

## 2023-09-19 PROCEDURE — 88305 TISSUE EXAM BY PATHOLOGIST: CPT | Performed by: STUDENT IN AN ORGANIZED HEALTH CARE EDUCATION/TRAINING PROGRAM

## 2023-09-20 ENCOUNTER — TELEPHONE (OUTPATIENT)
Dept: OBGYN CLINIC | Facility: CLINIC | Age: 23
End: 2023-09-20

## 2023-09-20 NOTE — TELEPHONE ENCOUNTER
----- Message from Timothy Gaytan, [address] [facility] sent at 9/20/2023 11:59 AM EDT -----  Pathology does confirm tissue removed at visit was POC. Was supposed to have visit with dr Zoila Madrigal but looks like she did not show for appt-was concerned for Retained POC. Please f/u with her.

## 2023-09-20 NOTE — RESULT ENCOUNTER NOTE
Pathology does confirm tissue removed at visit was POC. Was supposed to have visit with dr Angelita Muller but looks like she did not show for appt-was concerned for Retained POC. Please f/u with her.

## 2023-09-27 ENCOUNTER — TELEPHONE (OUTPATIENT)
Dept: OBGYN CLINIC | Facility: CLINIC | Age: 23
End: 2023-09-27

## 2023-09-27 NOTE — TELEPHONE ENCOUNTER
Pt would like to know if we could send in another RX for BV for her? Pharmacy - [facility] in [place], [place].   Please advise

## 2023-09-27 NOTE — TELEPHONE ENCOUNTER
I did not call her as yet - should I give her an appt for retained poc?  Orwill you just send in rx for bv?

## 2023-09-28 NOTE — TELEPHONE ENCOUNTER
If pt calls back can speak to anyone in triage. We need symptoms of BV or bleeding to relay to Ralph Amaya before doing any next steps of MD OV or sending in an script.

## 2023-10-03 ENCOUNTER — OFFICE VISIT (OUTPATIENT)
Dept: OBGYN CLINIC | Facility: CLINIC | Age: 23
End: 2023-10-03
Payer: COMMERCIAL

## 2023-10-03 VITALS
HEIGHT: 61 IN | DIASTOLIC BLOOD PRESSURE: 74 MMHG | SYSTOLIC BLOOD PRESSURE: 110 MMHG | BODY MASS INDEX: 28.74 KG/M2 | WEIGHT: 152.2 LBS

## 2023-10-03 DIAGNOSIS — N76.0 BACTERIAL VAGINOSIS: Primary | ICD-10-CM

## 2023-10-03 DIAGNOSIS — B96.89 BACTERIAL VAGINOSIS: Primary | ICD-10-CM

## 2023-10-03 LAB
BV WHIFF TEST VAG QL: NEGATIVE
CLUE CELLS SPEC QL WET PREP: POSITIVE
PH SMN: 5.3 [PH]
T VAGINALIS VAG QL WET PREP: NEGATIVE
YEAST VAG QL WET PREP: NEGATIVE

## 2023-10-03 PROCEDURE — 99213 OFFICE O/P EST LOW 20 MIN: CPT

## 2023-10-03 PROCEDURE — 87109 MYCOPLASMA: CPT

## 2023-10-03 PROCEDURE — 87210 SMEAR WET MOUNT SALINE/INK: CPT

## 2023-10-03 RX ORDER — METRONIDAZOLE 500 MG/1
500 TABLET ORAL EVERY 12 HOURS SCHEDULED
Qty: 14 TABLET | Refills: 0 | Status: SHIPPED | OUTPATIENT
Start: 2023-10-03 | End: 2023-10-10

## 2023-10-03 NOTE — PROGRESS NOTES
Assessment/Plan:    Bacterial vaginosis  -reviewed perineal hygiene at length with patient.   -pt declined STD screening. States she was recently tested and has not had any new partners.   -elevated pH and clue cells present on wet mount.   -advised patient to use boric acid nightly for 3 weeks. After first week, start flagyl. No sexual activity until treatment is completed. -return if symptoms continue. Diagnoses and all orders for this visit:    Bacterial vaginosis  -     Mycoplasma / ureaplasma culture  -     POCT wet mount  -     metroNIDAZOLE (FLAGYL) 500 mg tablet; Take 1 tablet (500 mg total) by mouth every 12 (twelve) hours for 7 days Start on day 7 of boric acid suppositories          Subjective:      Patient ID: Lisa Paz is a 25 y.o. female     Patient here for recurring BV infections. Reports vaginal discharge, odor, and irritation that started last week but resolved. Recently seen in ED for SAB and treated with misoprostol in the beginning of September. She denies any vaginal bleeding or cramping. Currently taking POPs for contraception. No additional concerns today. Patient would like to be tested for ureaplasma and mycoplasma. The following portions of the patient's history were reviewed and updated as appropriate: She  has a past medical history of Asthma, Depression, and Varicella. She   Patient Active Problem List    Diagnosis Date Noted   • Bacterial vaginosis 10/03/2023   • Missed ab 09/07/2023   • Dyspareunia, female 06/21/2023   • CMT (cervical motion tenderness) 06/21/2023   • Encounter for annual routine gynecological examination 06/19/2023   • Iron deficiency anemia 06/10/2022   • BMI 25.0-25.9,adult 06/10/2022   • History of depression 06/17/2021   • Mild intermittent asthma without complication 84/85/9487     She  has no past surgical history on file.   Her family history includes Asthma in her maternal grandmother and mother; Diabetes in her paternal grandmother; Hypertension in her maternal grandmother; No Known Problems in her brother, father, maternal grandfather, paternal grandfather, and sister. She  reports that she has never smoked. She has never used smokeless tobacco. She reports current alcohol use. She reports that she does not currently use drugs. Current Outpatient Medications   Medication Sig Dispense Refill   • albuterol (PROVENTIL HFA,VENTOLIN HFA) 90 mcg/act inhaler Inhale 2 puffs every 6 (six) hours as needed for wheezing 6.7 g 1   • ferrous sulfate 324 (65 Fe) mg TAKE 1 TABLET BY MOUTH DAILY BEFORE BREAKFAST 30 tablet 0   • ibuprofen (MOTRIN) 600 mg tablet Take 1 tablet (600 mg total) by mouth every 6 (six) hours as needed for mild pain 60 tablet 1   • loratadine (CLARITIN) 10 mg tablet Take 1 tablet (10 mg total) by mouth daily 90 tablet 0   • metroNIDAZOLE (FLAGYL) 500 mg tablet Take 1 tablet (500 mg total) by mouth every 12 (twelve) hours for 7 days Start on day 7 of boric acid suppositories 14 tablet 0   • norethindrone (MICRONOR) 0.35 MG tablet TAKE 1 TABLET BY MOUTH EVERY DAY 84 tablet 4     No current facility-administered medications for this visit. She has No Known Allergies. .    Review of Systems   Constitutional: Negative for chills and fever. Gastrointestinal: Negative for abdominal pain. Genitourinary: Positive for vaginal discharge. Negative for dyspareunia, dysuria, pelvic pain, vaginal bleeding and vaginal pain. Musculoskeletal: Negative for back pain and myalgias. Skin: Negative for rash. Psychiatric/Behavioral: Negative for confusion. Objective:      /74 (BP Location: Left arm, Patient Position: Sitting)   Ht 5' 1" (1.549 m)   Wt 69 kg (152 lb 3.2 oz)   LMP 06/09/2023 (Approximate)   BMI 28.76 kg/m²          Physical Exam  Vitals and nursing note reviewed. Constitutional:       General: She is not in acute distress. Appearance: Normal appearance. She is not ill-appearing.    HENT:      Head: Normocephalic and atraumatic. Eyes:      Conjunctiva/sclera: Conjunctivae normal.   Pulmonary:      Effort: Pulmonary effort is normal.   Abdominal:      Palpations: Abdomen is soft. Tenderness: There is no abdominal tenderness. Genitourinary:     General: Normal vulva. Exam position: Lithotomy position. Labia:         Right: No rash, tenderness or lesion. Left: No rash, tenderness or lesion. Vagina: No signs of injury and foreign body. Vaginal discharge present. No erythema, tenderness, bleeding, lesions or prolapsed vaginal walls. Cervix: No cervical motion tenderness, discharge, friability, lesion, erythema, cervical bleeding or eversion. Musculoskeletal:         General: Normal range of motion. Cervical back: Neck supple. Lymphadenopathy:      Lower Body: No right inguinal adenopathy. No left inguinal adenopathy. Skin:     General: Skin is warm and dry. Neurological:      General: No focal deficit present. Mental Status: She is alert.    Psychiatric:         Mood and Affect: Mood normal.         Behavior: Behavior normal.

## 2023-10-03 NOTE — PATIENT INSTRUCTIONS
Bacterial Vaginosis   AMBULATORY CARE:   Bacterial vaginosis  is an infection in the vagina. It may cause vaginitis (irritation and inflammation of the vagina). The cause is not known. Bacteria normally found in the vagina are imbalanced. Your risk increases if you are sexually active, you use a douche, or you have an intrauterine device (IUD). Common signs and symptoms of bacterial vaginosis:   White, gray, or yellow vaginal discharge    Vaginal discharge that smells like fish    Itching or burning around the outside of your vagina    Call your doctor or gynecologist if:   Your symptoms come back or do not improve with treatment. You have vaginal bleeding that is not your monthly period. You have questions or concerns about your condition or care. Antibiotics  are given to kill the bacteria. They may be given as a pill or as a cream to put in your vagina. Bacterial vaginosis and pregnancy:  If you have bacterial vaginosis during pregnancy, your baby may be born early or have a low birth weight. Your healthcare provider may recommend testing for bacterial vaginosis before or during your pregnancy. He or she will talk to you about your risk for premature delivery, and make sure you know the benefits and risks of testing. Prevent bacterial vaginosis:   Keep your vaginal area clean and dry. Wear underwear and pantyhose with a cotton crotch. Wipe from front to back after you urinate or have a bowel movement. After you bathe, rinse soap from your vaginal area to decrease your risk for irritation. Do not use products that cause irritation. Always use unscented tampons or sanitary pads. Do not use feminine sprays, powders, or scented tampons. They may cause irritation and increase your risk for vaginosis. Detergents and fabric softeners may also cause irritation. Do not use a douche. This can cause an imbalance in healthy vaginal bacteria. Use latex condoms during sex.   This helps prevent another infection and keeps your partner from getting the infection. Follow up with your doctor or gynecologist as directed: Bacterial vaginosis increases the risk for several health problems, such as pelvic inflammatory disease (PID) or sexually transmitted infections. Work with your healthcare providers to schedule regular appointments to check for health problems. Write down your questions so you remember to ask them during your visits. © Copyright Emely Bazzi 2023 Information is for End User's use only and may not be sold, redistributed or otherwise used for commercial purposes. The above information is an  only. It is not intended as medical advice for individual conditions or treatments. Talk to your doctor, nurse or pharmacist before following any medical regimen to see if it is safe and effective for you. Perineal Hygiene     No soaps or feminine wash to the vulva. Use only water to cleanse, or water with D[employer] or [employer] if necessary. No lotion to the area. Use only coconut oil for moisture if needed   No douching     Cotton underware, loose fitting clothing  Only perfume-free, dye-free laundry detergent, use a second rinse cycle   Avoid fabric softeners/dryer sheets. Coconut oil as a lubricant (if not using condoms) or another scent-free lubricant (Astroglide, Uberlube) if needed. Partner to avoid the same products as well. Over the counter probiotic to restore vaginal allegra may be helpful as well     You may also look into Boric Acid vaginal suppositories to restore vaginal PH balance for up to 2 weeks as directed on the box.  You may not use these if you are pregnant

## 2023-10-03 NOTE — ASSESSMENT & PLAN NOTE
-reviewed perineal hygiene at length with patient.   -pt declined STD screening. States she was recently tested and has not had any new partners.   -elevated pH and clue cells present on wet mount.   -advised patient to use boric acid nightly for 3 weeks. After first week, start flagyl. No sexual activity until treatment is completed. -return if symptoms continue.

## 2023-10-10 LAB
M HOMINIS SPEC QL CULT: NEGATIVE
U UREALYTICUM SPEC QL CULT: NEGATIVE

## 2023-11-06 ENCOUNTER — OFFICE VISIT (OUTPATIENT)
Age: 23
End: 2023-11-06
Payer: COMMERCIAL

## 2023-11-06 VITALS
HEART RATE: 67 BPM | DIASTOLIC BLOOD PRESSURE: 64 MMHG | RESPIRATION RATE: 16 BRPM | WEIGHT: 148 LBS | HEIGHT: 61 IN | OXYGEN SATURATION: 100 % | SYSTOLIC BLOOD PRESSURE: 102 MMHG | TEMPERATURE: 97.6 F | BODY MASS INDEX: 27.94 KG/M2

## 2023-11-06 DIAGNOSIS — E55.9 VITAMIN D INSUFFICIENCY: ICD-10-CM

## 2023-11-06 DIAGNOSIS — D50.9 IRON DEFICIENCY ANEMIA, UNSPECIFIED IRON DEFICIENCY ANEMIA TYPE: Primary | ICD-10-CM

## 2023-11-06 DIAGNOSIS — R53.82 CHRONIC FATIGUE: ICD-10-CM

## 2023-11-06 DIAGNOSIS — J45.20 MILD INTERMITTENT ASTHMA WITHOUT COMPLICATION: ICD-10-CM

## 2023-11-06 DIAGNOSIS — E55.9 INADEQUATE VITAMIN D AND VITAMIN D DERIVATIVE INTAKE: ICD-10-CM

## 2023-11-06 PROCEDURE — 99214 OFFICE O/P EST MOD 30 MIN: CPT | Performed by: FAMILY MEDICINE

## 2023-11-06 RX ORDER — FERROUS SULFATE 324(65)MG
324 TABLET, DELAYED RELEASE (ENTERIC COATED) ORAL
Qty: 90 TABLET | Refills: 3 | Status: SHIPPED | OUTPATIENT
Start: 2023-11-06 | End: 2024-02-04

## 2023-11-06 RX ORDER — ALBUTEROL SULFATE 90 UG/1
2 AEROSOL, METERED RESPIRATORY (INHALATION) EVERY 6 HOURS PRN
Qty: 6.7 G | Refills: 1 | Status: SHIPPED | OUTPATIENT
Start: 2023-11-06

## 2023-11-06 NOTE — PROGRESS NOTES
[address]    Name: Viviana Primrose      YOB: 2000      MRN: [id number]  Encounter Provider: Kayli Slaughter MD      Encounter Date: 11/06/23    Encounter Dept: [address]     1. Iron deficiency anemia, unspecified iron deficiency anemia type  Assessment & Plan:  History of iron deficiency anemia, intermittent symptoms of lightheadedness, chronic fatigue would like repeat labs    Labs in place  Follow-up for annual physical    Orders:  -     CBC and differential; Future; Expected date: 11/06/2023  -     ferrous sulfate 324 (65 Fe) mg; Take 1 tablet (324 mg total) by mouth daily before breakfast    2. Chronic fatigue  Assessment & Plan: With history of iron deficiency anemia, recent miscarriage, history of depression. Discussed history of depression at next appointment  Follow-up with labs    Orders:  -     TSH, 3rd generation with Free T4 reflex; Future; Expected date: 11/06/2023  -     Lipid Panel with Direct LDL reflex; Future; Expected date: 11/06/2023  -     Comprehensive metabolic panel; Future; Expected date: 11/06/2023  -     ferrous sulfate 324 (65 Fe) mg; Take 1 tablet (324 mg total) by mouth daily before breakfast    3. Mild intermittent asthma without complication  Assessment & Plan:  Controlled on albuterol as needed. Requires refills    Orders:  -     albuterol (PROVENTIL HFA,VENTOLIN HFA) 90 mcg/act inhaler; Inhale 2 puffs every 6 (six) hours as needed for wheezing    4. Vitamin D insufficiency  -     Vitamin D 25 hydroxy; Future; Expected date: 11/06/2023    5. Inadequate vitamin D and vitamin D derivative intake  -     Vitamin D 25 hydroxy; Future; Expected date: 11/06/2023          Depression Screening and Follow-up Plan: Patient was screened for depression during today's encounter.  They screened negative with a PHQ-2 score of 0.        Pertinent labs were evaluated and discussed with patient today. Follow-Up Plans   Return in about 2 weeks (around 11/20/2023) for Annual physical.              Chief Complaint    No chief complaint on file. Subjective   Anirudh Segovia is a 25 y.o. with  has a past medical history of Asthma, Depression, and Varicella. Early sept 2023 miscarriage. Lightheadedness incident happened around this time. Today patient reports about lightheadedness. Review of Systems   Constitutional:  Negative for chills, fever and unexpected weight change. HENT:  Negative for congestion, rhinorrhea and sore throat. Eyes:  Negative for visual disturbance. Respiratory:  Negative for chest tightness, shortness of breath and wheezing. Cardiovascular:  Negative for chest pain. Gastrointestinal:  Negative for abdominal pain, constipation, diarrhea, nausea and vomiting. Endocrine: Negative for polyuria. Genitourinary:  Negative for dysuria and hematuria. Skin:  Negative for rash. Neurological:  Positive for light-headedness (intermittently, around time of miscarriage. ). Negative for dizziness, weakness and headaches. Psychiatric/Behavioral:  Negative for confusion, self-injury and suicidal ideas.           Current Medication List     Current Outpatient Medications:     albuterol (PROVENTIL HFA,VENTOLIN HFA) 90 mcg/act inhaler, Inhale 2 puffs every 6 (six) hours as needed for wheezing, Disp: 6.7 g, Rfl: 1    ferrous sulfate 324 (65 Fe) mg, Take 1 tablet (324 mg total) by mouth daily before breakfast, Disp: 90 tablet, Rfl: 3    ibuprofen (MOTRIN) 600 mg tablet, Take 1 tablet (600 mg total) by mouth every 6 (six) hours as needed for mild pain, Disp: 60 tablet, Rfl: 1    loratadine (CLARITIN) 10 mg tablet, Take 1 tablet (10 mg total) by mouth daily (Patient taking differently: Take 10 mg by mouth if needed), Disp: 90 tablet, Rfl: 0    norethindrone (MICRONOR) 0.35 MG tablet, TAKE 1 TABLET BY MOUTH EVERY DAY, Disp: 84 tablet, Rfl: 4      Objective     /64 (BP Location: Left arm, Patient Position: Sitting, Cuff Size: Standard)   Pulse 67   Temp 97.6 °F (36.4 °C) (Tympanic)   Resp 16   Ht 5' 1" (1.549 m)   Wt 67.1 kg (148 lb)   SpO2 100%   BMI 27.96 kg/m²      Physical Exam  Vitals reviewed. Constitutional:       General: She is not in acute distress. Appearance: Normal appearance. She is not ill-appearing, toxic-appearing or diaphoretic. HENT:      Head: Normocephalic and atraumatic. Right Ear: External ear normal.      Left Ear: External ear normal.      Nose: Nose normal.      Mouth/Throat:      Mouth: Mucous membranes are moist.   Eyes:      General: No scleral icterus. Right eye: No discharge. Left eye: No discharge. Extraocular Movements: Extraocular movements intact. Conjunctiva/sclera: Conjunctivae normal.   Cardiovascular:      Rate and Rhythm: Normal rate and regular rhythm. Pulses: Normal pulses. Heart sounds: Normal heart sounds. Pulmonary:      Effort: Pulmonary effort is normal. No respiratory distress. Breath sounds: Normal breath sounds. Abdominal:      Palpations: Abdomen is soft. Tenderness: There is no abdominal tenderness. Musculoskeletal:         General: No swelling. Normal range of motion. Cervical back: Normal range of motion. Skin:     General: Skin is warm and dry. Neurological:      General: No focal deficit present. Mental Status: She is alert and oriented to person, place, and time. Psychiatric:         Mood and Affect: Mood normal.         Behavior: Behavior normal.         Thought Content:  Thought content normal.           Doc Zhang MD  Family Medicine Physician   [facility]

## 2023-11-06 NOTE — ASSESSMENT & PLAN NOTE
With history of iron deficiency anemia, recent miscarriage, history of depression.      Discussed history of depression at next appointment  Follow-up with labs

## 2023-11-06 NOTE — ASSESSMENT & PLAN NOTE
History of iron deficiency anemia, intermittent symptoms of lightheadedness, chronic fatigue would like repeat labs    Labs in place  Follow-up for annual physical

## 2023-12-01 ENCOUNTER — APPOINTMENT (OUTPATIENT)
Dept: LAB | Facility: CLINIC | Age: 23
End: 2023-12-01
Payer: COMMERCIAL

## 2023-12-01 DIAGNOSIS — E55.9 INADEQUATE VITAMIN D AND VITAMIN D DERIVATIVE INTAKE: ICD-10-CM

## 2023-12-01 DIAGNOSIS — D50.9 IRON DEFICIENCY ANEMIA, UNSPECIFIED IRON DEFICIENCY ANEMIA TYPE: ICD-10-CM

## 2023-12-01 DIAGNOSIS — E55.9 VITAMIN D INSUFFICIENCY: ICD-10-CM

## 2023-12-01 DIAGNOSIS — R53.82 CHRONIC FATIGUE: ICD-10-CM

## 2023-12-01 LAB
25(OH)D3 SERPL-MCNC: 12.6 NG/ML (ref 30–100)
ALBUMIN SERPL BCP-MCNC: 4.1 G/DL (ref 3.5–5)
ALP SERPL-CCNC: 46 U/L (ref 34–104)
ALT SERPL W P-5'-P-CCNC: 11 U/L (ref 7–52)
ANION GAP SERPL CALCULATED.3IONS-SCNC: 7 MMOL/L
AST SERPL W P-5'-P-CCNC: 16 U/L (ref 13–39)
BASOPHILS # BLD AUTO: 0.03 THOUSANDS/ÂΜL (ref 0–0.1)
BASOPHILS NFR BLD AUTO: 1 % (ref 0–1)
BILIRUB SERPL-MCNC: 0.48 MG/DL (ref 0.2–1)
BUN SERPL-MCNC: 9 MG/DL (ref 5–25)
CALCIUM SERPL-MCNC: 8.3 MG/DL (ref 8.4–10.2)
CHLORIDE SERPL-SCNC: 108 MMOL/L (ref 96–108)
CHOLEST SERPL-MCNC: 115 MG/DL
CO2 SERPL-SCNC: 24 MMOL/L (ref 21–32)
CREAT SERPL-MCNC: 0.5 MG/DL (ref 0.6–1.3)
EOSINOPHIL # BLD AUTO: 0.22 THOUSAND/ÂΜL (ref 0–0.61)
EOSINOPHIL NFR BLD AUTO: 5 % (ref 0–6)
ERYTHROCYTE [DISTWIDTH] IN BLOOD BY AUTOMATED COUNT: 12.7 % (ref 11.6–15.1)
GFR SERPL CREATININE-BSD FRML MDRD: 138 ML/MIN/1.73SQ M
GLUCOSE P FAST SERPL-MCNC: 85 MG/DL (ref 65–99)
HCT VFR BLD AUTO: 35.4 % (ref 34.8–46.1)
HDLC SERPL-MCNC: 46 MG/DL
HGB BLD-MCNC: 11.2 G/DL (ref 11.5–15.4)
IMM GRANULOCYTES # BLD AUTO: 0 THOUSAND/UL (ref 0–0.2)
IMM GRANULOCYTES NFR BLD AUTO: 0 % (ref 0–2)
LDLC SERPL CALC-MCNC: 61 MG/DL (ref 0–100)
LYMPHOCYTES # BLD AUTO: 2.08 THOUSANDS/ÂΜL (ref 0.6–4.47)
LYMPHOCYTES NFR BLD AUTO: 45 % (ref 14–44)
MCH RBC QN AUTO: 28.3 PG (ref 26.8–34.3)
MCHC RBC AUTO-ENTMCNC: 31.6 G/DL (ref 31.4–37.4)
MCV RBC AUTO: 89 FL (ref 82–98)
MONOCYTES # BLD AUTO: 0.34 THOUSAND/ÂΜL (ref 0.17–1.22)
MONOCYTES NFR BLD AUTO: 8 % (ref 4–12)
NEUTROPHILS # BLD AUTO: 1.83 THOUSANDS/ÂΜL (ref 1.85–7.62)
NEUTS SEG NFR BLD AUTO: 41 % (ref 43–75)
NRBC BLD AUTO-RTO: 0 /100 WBCS
PLATELET # BLD AUTO: 177 THOUSANDS/UL (ref 149–390)
PMV BLD AUTO: 12.5 FL (ref 8.9–12.7)
POTASSIUM SERPL-SCNC: 4 MMOL/L (ref 3.5–5.3)
PROT SERPL-MCNC: 7.2 G/DL (ref 6.4–8.4)
RBC # BLD AUTO: 3.96 MILLION/UL (ref 3.81–5.12)
SODIUM SERPL-SCNC: 139 MMOL/L (ref 135–147)
TRIGL SERPL-MCNC: 41 MG/DL
TSH SERPL DL<=0.05 MIU/L-ACNC: 1.8 UIU/ML (ref 0.45–4.5)
WBC # BLD AUTO: 4.5 THOUSAND/UL (ref 4.31–10.16)

## 2023-12-01 PROCEDURE — 85025 COMPLETE CBC W/AUTO DIFF WBC: CPT

## 2023-12-01 PROCEDURE — 84443 ASSAY THYROID STIM HORMONE: CPT

## 2023-12-01 PROCEDURE — 80061 LIPID PANEL: CPT

## 2023-12-01 PROCEDURE — 36415 COLL VENOUS BLD VENIPUNCTURE: CPT

## 2023-12-01 PROCEDURE — 80053 COMPREHEN METABOLIC PANEL: CPT

## 2023-12-01 PROCEDURE — 82306 VITAMIN D 25 HYDROXY: CPT

## 2023-12-05 ENCOUNTER — OFFICE VISIT (OUTPATIENT)
Age: 23
End: 2023-12-05
Payer: COMMERCIAL

## 2023-12-05 VITALS
HEIGHT: 61 IN | WEIGHT: 150.6 LBS | BODY MASS INDEX: 28.43 KG/M2 | HEART RATE: 84 BPM | DIASTOLIC BLOOD PRESSURE: 58 MMHG | OXYGEN SATURATION: 97 % | SYSTOLIC BLOOD PRESSURE: 106 MMHG | TEMPERATURE: 97.9 F

## 2023-12-05 DIAGNOSIS — O02.1 MISSED AB: ICD-10-CM

## 2023-12-05 DIAGNOSIS — Z00.00 HEALTHCARE MAINTENANCE: Primary | ICD-10-CM

## 2023-12-05 DIAGNOSIS — D50.0 IRON DEFICIENCY ANEMIA DUE TO CHRONIC BLOOD LOSS: ICD-10-CM

## 2023-12-05 DIAGNOSIS — E55.9 VITAMIN D DEFICIENCY: ICD-10-CM

## 2023-12-05 PROCEDURE — 99385 PREV VISIT NEW AGE 18-39: CPT | Performed by: FAMILY MEDICINE

## 2023-12-05 RX ORDER — CHOLECALCIFEROL (VITAMIN D3) 1250 MCG
50000 CAPSULE ORAL WEEKLY
Qty: 12 CAPSULE | Refills: 0 | Status: SHIPPED | OUTPATIENT
Start: 2023-12-05 | End: 2024-02-21

## 2023-12-05 NOTE — ASSESSMENT & PLAN NOTE
Health maintenance completed today. - Medical history reviewed, including existing medical conditions, medications, and surgeries. - Labs discussed to evaluate cholesterol, blood sugar, kidney function, liver function, and other important markers of health. - BMI evaluated and discussed   - Lifestyle and health counseling completed including diet, exercise habits, smoking status, alcohol consumption.   - Bone & Heart health reviewed, including importance of Vit D3, Vit K2, supplements provided if indicated. - Cancer screenings discussed: Mammogram/Pap smear/CT lung/colonoscopy . - Vaccination status reviewed and pertinent immunizations and booster shots discussed. - Mental health and wellbeing evaluated and discussed  - Family history obtained to identify any of hereditary health risks     Last screenings completed:  Pap smear  Not on file      Plan   Already reviewed labs that were completed. Preventative orders in place as recommended. Return in 1 year for annual physical unless needed to be seen sooner.

## 2023-12-05 NOTE — ASSESSMENT & PLAN NOTE
Low vitamin D levels    Start vitamin D3 50,000 units once a week for 12 weeks, prescription sent   Follow-up with vitamin D level in 3 months, after completion of 12-week course to determine daily dose  Start vitamin D3 5000 units daily supplements from over-the-counter after completion of 12-week course  Additionally take vitamin K2 100-200 mcg daily, calcium 600-1000 mg daily as discussed.

## 2023-12-05 NOTE — ASSESSMENT & PLAN NOTE
History of iron deficiency anemia, intermittent symptoms of lightheadedness, chronic fatigue would like repeat labs    Discussed taking irons supplements regularly.    Parameters of return discussed

## 2023-12-05 NOTE — PROGRESS NOTES
[facility] PRIMARY [facility]  [address]  [place], [place]    NAME: Eun Ashby   AGE: 26 y.o. SEX: female   : 2000          Assessment and Plan:     1. Healthcare maintenance  Assessment & Plan:  Health maintenance completed today. - Medical history reviewed, including existing medical conditions, medications, and surgeries. - Labs discussed to evaluate cholesterol, blood sugar, kidney function, liver function, and other important markers of health. - BMI evaluated and discussed   - Lifestyle and health counseling completed including diet, exercise habits, smoking status, alcohol consumption.   - Bone & Heart health reviewed, including importance of Vit D3, Vit K2, supplements provided if indicated. - Cancer screenings discussed: Mammogram/Pap smear/CT lung/colonoscopy . - Vaccination status reviewed and pertinent immunizations and booster shots discussed. - Mental health and wellbeing evaluated and discussed  - Family history obtained to identify any of hereditary health risks     Last screenings completed:  Pap smear  Not on file      Plan   Already reviewed labs that were completed. Preventative orders in place as recommended. Return in 1 year for annual physical unless needed to be seen sooner. 2. Vitamin D deficiency  Assessment & Plan:  Low vitamin D levels    Start vitamin D3 50,000 units once a week for 12 weeks, prescription sent   Follow-up with vitamin D level in 3 months, after completion of 12-week course to determine daily dose  Start vitamin D3 5000 units daily supplements from over-the-counter after completion of 12-week course  Additionally take vitamin K2 100-200 mcg daily, calcium 600-1000 mg daily as discussed. Orders:  -     Cholecalciferol (Vitamin D3) 1.25 MG (57401 UT) CAPS; Take 1 capsule (50,000 Units total) by mouth once a week for 12 doses  -     Vitamin D 25 hydroxy;  Future; Expected date: 02/03/2024    3. Iron deficiency anemia due to chronic blood loss  Assessment & Plan:  History of iron deficiency anemia, intermittent symptoms of lightheadedness, chronic fatigue would like repeat labs    Discussed taking irons supplements regularly. Parameters of return discussed      4. Missed ab  Assessment & Plan:  D&C on 9/14. Immunizations and preventive care screenings were discussed with patient today. Appropriate education was printed on patient's after visit summary. BMI Counseling: Body mass index is 28.46 kg/m². The BMI is above normal. Nutrition recommendations include decreasing portion sizes, encouraging healthy choices of fruits and vegetables, decreasing fast food intake, consuming healthier snacks, limiting drinks that contain sugar, moderation in carbohydrate intake, increasing intake of lean protein, reducing intake of saturated and trans fat and reducing intake of cholesterol. Exercise recommendations include moderate physical activity 150 minutes/week and strength training exercises. Rationale for BMI follow-up plan is due to patient being overweight or obese. Counseling:  Alcohol/drug use: discussed moderation in alcohol intake, the recommendations for healthy alcohol use, and avoidance of illicit drug use. Dental Health: discussed importance of regular tooth brushing, flossing, and dental visits. Injury prevention: discussed safety/seat belts, safety helmets, smoke detectors, carbon dioxide detectors, and smoking near bedding or upholstery. Sexual health: discussed sexually transmitted diseases, partner selection, use of condoms, avoidance of unintended pregnancy, and contraceptive alternatives. Exercise: the importance of regular exercise/physical activity was discussed. Recommend exercise 3-5 times per week for at least 30 minutes.           Follow-up Plan:   Return in about 1 year (around 12/5/2024) for Annual physical.        ________________________________________________________________________   Reason For Visit:   Follow-up (Follow up for lab work) and Physical Exam        History of Present Illness:     Adult Annual Physical   Kaila Smallwood is here for a comprehensive physical exam.     Concerns  The patient reports no problems. Kaila Smallwood reports living with daughter (nearly 2) and boyfriend. Had a miscarriage recently. Eduction/Work: works from home, . Student - general studies          Diet and Physical Activity  Diet/Nutrition: well balanced diet. Exercise: no formal exercise. General Health  Sleep: gets 7-8 hours of sleep on average. Hearing: normal - bilateral.  Vision: wears glasses. Dental: no dental visits for >1 year and brushes teeth twice daily. Depression Screening  PHQ-2/9 Depression Screening           Anxiety: no.  Past thoughts of SI/SH: no.  Past trauma/significant events in life: yes. Miscarriage. /GYN Health  Patient is: premenopausal  Last menstrual period: 11/15, regular, but heavy. ON iron pills   Contraceptive method: oral contraceptives. Social History     Substance and Sexual Activity   Sexual Activity Yes    Partners: Male    Birth control/protection: OCP       Others  Safety concerns: no.  Smoking history: no.  Recreational drugs: no.  Alcohol consumption: socially       Review of Systems:   Review of Systems   Constitutional:  Negative for chills, fever and unexpected weight change. HENT:  Negative for congestion, rhinorrhea and sore throat. Eyes:  Negative for visual disturbance. Respiratory:  Negative for chest tightness, shortness of breath and wheezing. Cardiovascular:  Negative for chest pain. Gastrointestinal:  Negative for abdominal pain, constipation, diarrhea, nausea and vomiting. Endocrine: Negative for polyuria. Genitourinary:  Negative for dysuria and hematuria. Skin:  Negative for rash. Neurological:  Positive for dizziness. Negative for weakness, light-headedness and headaches. Psychiatric/Behavioral:  Negative for confusion. Past Medical History:     Past Medical History:   Diagnosis Date    Asthma     albuterol prn    Depression     intermittent   no meds    Varicella     had vaccines        Past Surgical History:   History reviewed. No pertinent surgical history. Family History:     Family History   Problem Relation Age of Onset    Asthma Mother     No Known Problems Father     No Known Problems Sister     No Known Problems Brother     Asthma Maternal Grandmother     Hypertension Maternal Grandmother     No Known Problems Maternal Grandfather     Diabetes Paternal Grandmother     No Known Problems Paternal Grandfather     Ovarian cancer Neg Hx     Cervical cancer Neg Hx     Uterine cancer Neg Hx     Colon cancer Neg Hx     Breast cancer Neg Hx         Social History:    reports that she has never smoked. She has never used smokeless tobacco. She reports current alcohol use. She reports that she does not currently use drugs.       Current Medications:     Current Outpatient Medications:     albuterol (PROVENTIL HFA,VENTOLIN HFA) 90 mcg/act inhaler, Inhale 2 puffs every 6 (six) hours as needed for wheezing, Disp: 6.7 g, Rfl: 1    Cholecalciferol (Vitamin D3) 1.25 MG (75747 UT) CAPS, Take 1 capsule (50,000 Units total) by mouth once a week for 12 doses, Disp: 12 capsule, Rfl: 0    ferrous sulfate 324 (65 Fe) mg, Take 1 tablet (324 mg total) by mouth daily before breakfast, Disp: 90 tablet, Rfl: 3    ibuprofen (MOTRIN) 600 mg tablet, Take 1 tablet (600 mg total) by mouth every 6 (six) hours as needed for mild pain, Disp: 60 tablet, Rfl: 1    loratadine (CLARITIN) 10 mg tablet, Take 1 tablet (10 mg total) by mouth daily (Patient taking differently: Take 10 mg by mouth if needed), Disp: 90 tablet, Rfl: 0    norethindrone (MICRONOR) 0.35 MG tablet, TAKE 1 TABLET BY MOUTH EVERY DAY, Disp: 84 tablet, Rfl: 4     Allergies:   No Known Allergies      Physical Exam:     /58 (BP Location: Left arm, Patient Position: Sitting, Cuff Size: Standard)   Pulse 84   Temp 97.9 °F (36.6 °C) (Tympanic)   Ht 5' 1" (1.549 m)   Wt 68.3 kg (150 lb 9.6 oz)   SpO2 97%   BMI 28.46 kg/m²      Physical Exam  Vitals reviewed. Constitutional:       General: She is not in acute distress. Appearance: Normal appearance. She is not ill-appearing, toxic-appearing or diaphoretic. HENT:      Head: Normocephalic and atraumatic. Right Ear: External ear normal.      Left Ear: External ear normal.      Nose: Nose normal.      Mouth/Throat:      Mouth: Mucous membranes are moist.   Eyes:      General: No scleral icterus. Right eye: No discharge. Left eye: No discharge. Extraocular Movements: Extraocular movements intact. Conjunctiva/sclera: Conjunctivae normal.   Cardiovascular:      Rate and Rhythm: Normal rate and regular rhythm. Pulses: Normal pulses. Heart sounds: Normal heart sounds. Pulmonary:      Effort: Pulmonary effort is normal. No respiratory distress. Breath sounds: Normal breath sounds. Abdominal:      Palpations: Abdomen is soft. Tenderness: There is no abdominal tenderness. Musculoskeletal:         General: No swelling. Normal range of motion. Cervical back: Normal range of motion. Skin:     General: Skin is warm and dry. Neurological:      General: No focal deficit present. Mental Status: She is alert and oriented to person, place, and time. Psychiatric:         Mood and Affect: Mood normal.         Behavior: Behavior normal.         Thought Content:  Thought content normal.           Joyce Aleman MD  Family Medicine Physician   [facility]

## 2023-12-05 NOTE — PATIENT INSTRUCTIONS
Recommendation:  Take vitamin D3 50,000 units once a week for 12 weeks, prescription sent out to pharmacy. Stop daily vitamin D supplements when on the weekly high-dose supplements if prescribed. Follow-up with vitamin D blood work after completing the 12-week course.   Once completed with the 12-week course begin vitamin D3 9282-3127 units daily with food   Start Vitamin K2 100-200 mcg daily with food    Calcium 600-1000 mg daily with Vitamin K2   Fish oil with omega 3 to improve HDL (good cholesterol)

## 2024-01-21 DIAGNOSIS — J30.1 ALLERGIC RHINITIS DUE TO POLLEN, UNSPECIFIED SEASONALITY: ICD-10-CM

## 2024-01-21 DIAGNOSIS — J45.20 MILD INTERMITTENT ASTHMA WITHOUT COMPLICATION: ICD-10-CM

## 2024-01-22 RX ORDER — ALBUTEROL SULFATE 90 UG/1
2 AEROSOL, METERED RESPIRATORY (INHALATION) EVERY 6 HOURS PRN
Qty: 6.7 G | Refills: 0 | Status: SHIPPED | OUTPATIENT
Start: 2024-01-22

## 2024-01-22 RX ORDER — LORATADINE 10 MG/1
10 TABLET ORAL DAILY
Qty: 90 TABLET | Refills: 0 | Status: SHIPPED | OUTPATIENT
Start: 2024-01-22

## 2024-02-03 PROBLEM — Z00.00 HEALTHCARE MAINTENANCE: Status: RESOLVED | Noted: 2023-12-05 | Resolved: 2024-02-03

## 2024-02-21 PROBLEM — Z01.419 ENCOUNTER FOR ANNUAL ROUTINE GYNECOLOGICAL EXAMINATION: Status: RESOLVED | Noted: 2023-06-19 | Resolved: 2024-02-21

## 2024-02-25 DIAGNOSIS — E55.9 VITAMIN D DEFICIENCY: ICD-10-CM

## 2024-02-26 RX ORDER — CHOLECALCIFEROL (VITAMIN D3) 1250 MCG
50000 CAPSULE ORAL WEEKLY
Qty: 12 CAPSULE | Refills: 0 | Status: SHIPPED | OUTPATIENT
Start: 2024-02-26 | End: 2024-05-14

## 2024-04-29 DIAGNOSIS — J30.1 ALLERGIC RHINITIS DUE TO POLLEN, UNSPECIFIED SEASONALITY: ICD-10-CM

## 2024-04-29 DIAGNOSIS — J45.20 MILD INTERMITTENT ASTHMA WITHOUT COMPLICATION: ICD-10-CM

## 2024-04-30 RX ORDER — LORATADINE 10 MG/1
10 TABLET ORAL DAILY
Qty: 90 TABLET | Refills: 3 | Status: SHIPPED | OUTPATIENT
Start: 2024-04-30

## 2024-04-30 RX ORDER — ALBUTEROL SULFATE 90 UG/1
2 AEROSOL, METERED RESPIRATORY (INHALATION) EVERY 6 HOURS PRN
Qty: 6.7 G | Refills: 0 | Status: SHIPPED | OUTPATIENT
Start: 2024-04-30

## 2024-05-27 DIAGNOSIS — J45.20 MILD INTERMITTENT ASTHMA WITHOUT COMPLICATION: ICD-10-CM

## 2024-05-27 RX ORDER — ALBUTEROL SULFATE 90 UG/1
AEROSOL, METERED RESPIRATORY (INHALATION)
Qty: 6.7 G | Refills: 5 | Status: SHIPPED | OUTPATIENT
Start: 2024-05-27

## 2024-09-13 ENCOUNTER — NURSE TRIAGE (OUTPATIENT)
Age: 24
End: 2024-09-13

## 2024-09-13 DIAGNOSIS — O26.899 PELVIC PAIN IN PREGNANCY: Primary | ICD-10-CM

## 2024-09-13 DIAGNOSIS — R10.2 PELVIC PAIN IN PREGNANCY: Primary | ICD-10-CM

## 2024-09-13 NOTE — TELEPHONE ENCOUNTER
"Patient calling in stating that she's pregnant (D&V on 10/1) and is feeling a \"poking sensation\" and pain. LMP is 7/31/24.  Pt reporting its more so on the right side then left. Pt reporting the pain is 5/10. Pt reporting it started a week ago suddenly, and it comes and goes.     Patient is advised to go to the nearest emergency room for further evaluation, pt reporting that she will be arriving at [facility] after 7pm as she is currently working, pt is advised to go to the emergency room now for further evaluation, Pt verbalized understanding, no further questions at this time    [employer] Secure Chat sent to Dr Rigo Marks   [place] Secure Chat received: If pain is not severe ER not needed. Can start with cbc, HCG today. With repeat in 2-3 days. Early pregnancy can be associated with pain. Can use heating pads, Tylenol. Hydration is important.   If ectopic pain would be severe she would be passing out.     Patient was contacted and notified of Dr Rigo Marks's recommendations, Pt verbalized understanding, no further questions at this time      Orders placed as per Dr. Rigo Marks's recommenations, and OBGYN protocol.  Reason for Disposition   Intermittent lower abdominal pain lasting > 24 hours    Answer Assessment - Initial Assessment Questions  1. LOCATION: \"Where does it hurt?\"       Lower right abdomen   2. RADIATION: \"Does the pain shoot anywhere else?\" (e.g., chest, back, shoulder)      Pt denies   3. ONSET: \"When did the pain begin?\" (e.g., minutes, hours or days ago)       Pt reporting it started a week ago.   4. ONSET: \"Gradual or sudden onset?\"      Sudednly   5. PATTERN \"Does the pain come and go, or has it been constant since it started?\"       Comes and goes   6. SEVERITY: \"How bad is the pain?\" \"What does it keep you from doing?\"  (e.g., Scale 1-10; mild, moderate, or severe)    - MILD (1-3): doesn't interfere with normal activities, abdomen soft and not tender to touch     - MODERATE " "(4-7): interferes with normal activities or awakens from sleep, tender to touch     - SEVERE (8-10): excruciating pain, doubled over, unable to do any normal activities      5/10   7. RECURRENT SYMPTOM: \"Have you ever had this type of stomach pain before?\" If Yes, ask: \"When was the last time?\" and \"What happened that time?\"       Pt denies   8. CAUSE: \"What do you think is causing the stomach pain?\"      Pt unsure   9. RELIEVING/AGGRAVATING FACTORS: \"What makes it better or worse?\" (e.g., antacids, bowel movement, movement)      Pt denies   10. OTHER SYMPTOMS: \"Has there been any vaginal bleeding, fever, vomiting, diarrhea, or urine problems?\"        Pt denies vaginal bleeding, fever, vomiting, diarrhea, urine problems  11. PEPE: \"What date are you expecting to deliver?\"        D&V US on 10/1    Protocols used: Pregnancy - Abdominal Pain Less Than 20 Weeks EGA-ADULT-OH    "

## 2024-09-14 ENCOUNTER — APPOINTMENT (OUTPATIENT)
Dept: LAB | Facility: CLINIC | Age: 24
End: 2024-09-14
Payer: COMMERCIAL

## 2024-09-14 DIAGNOSIS — O26.899 PELVIC PAIN IN PREGNANCY: ICD-10-CM

## 2024-09-14 DIAGNOSIS — R10.2 PELVIC PAIN IN PREGNANCY: ICD-10-CM

## 2024-09-14 LAB
B-HCG SERPL-ACNC: ABNORMAL MIU/ML (ref 0–5)
BASOPHILS # BLD AUTO: 0.06 THOUSANDS/ΜL (ref 0–0.1)
BASOPHILS NFR BLD AUTO: 1 % (ref 0–1)
EOSINOPHIL # BLD AUTO: 0.43 THOUSAND/ΜL (ref 0–0.61)
EOSINOPHIL NFR BLD AUTO: 6 % (ref 0–6)
ERYTHROCYTE [DISTWIDTH] IN BLOOD BY AUTOMATED COUNT: 12.9 % (ref 11.6–15.1)
HCT VFR BLD AUTO: 35.1 % (ref 34.8–46.1)
HGB BLD-MCNC: 11.4 G/DL (ref 11.5–15.4)
IMM GRANULOCYTES # BLD AUTO: 0.01 THOUSAND/UL (ref 0–0.2)
IMM GRANULOCYTES NFR BLD AUTO: 0 % (ref 0–2)
LYMPHOCYTES # BLD AUTO: 2.53 THOUSANDS/ΜL (ref 0.6–4.47)
LYMPHOCYTES NFR BLD AUTO: 35 % (ref 14–44)
MCH RBC QN AUTO: 29.7 PG (ref 26.8–34.3)
MCHC RBC AUTO-ENTMCNC: 32.5 G/DL (ref 31.4–37.4)
MCV RBC AUTO: 91 FL (ref 82–98)
MONOCYTES # BLD AUTO: 0.59 THOUSAND/ΜL (ref 0.17–1.22)
MONOCYTES NFR BLD AUTO: 8 % (ref 4–12)
NEUTROPHILS # BLD AUTO: 3.71 THOUSANDS/ΜL (ref 1.85–7.62)
NEUTS SEG NFR BLD AUTO: 50 % (ref 43–75)
NRBC BLD AUTO-RTO: 0 /100 WBCS
PLATELET # BLD AUTO: 185 THOUSANDS/UL (ref 149–390)
PMV BLD AUTO: 12.5 FL (ref 8.9–12.7)
RBC # BLD AUTO: 3.84 MILLION/UL (ref 3.81–5.12)
WBC # BLD AUTO: 7.33 THOUSAND/UL (ref 4.31–10.16)

## 2024-09-14 PROCEDURE — 36415 COLL VENOUS BLD VENIPUNCTURE: CPT

## 2024-09-14 PROCEDURE — 84702 CHORIONIC GONADOTROPIN TEST: CPT

## 2024-09-14 PROCEDURE — 85025 COMPLETE CBC W/AUTO DIFF WBC: CPT

## 2024-09-21 ENCOUNTER — APPOINTMENT (OUTPATIENT)
Dept: LAB | Facility: CLINIC | Age: 24
End: 2024-09-21
Payer: COMMERCIAL

## 2024-09-21 DIAGNOSIS — R10.2 PELVIC PAIN IN PREGNANCY: ICD-10-CM

## 2024-09-21 DIAGNOSIS — O26.899 PELVIC PAIN IN PREGNANCY: ICD-10-CM

## 2024-09-21 LAB
B-HCG SERPL-ACNC: ABNORMAL MIU/ML (ref 0–5)
BASOPHILS # BLD AUTO: 0.04 THOUSANDS/ΜL (ref 0–0.1)
BASOPHILS NFR BLD AUTO: 1 % (ref 0–1)
EOSINOPHIL # BLD AUTO: 0.42 THOUSAND/ΜL (ref 0–0.61)
EOSINOPHIL NFR BLD AUTO: 6 % (ref 0–6)
ERYTHROCYTE [DISTWIDTH] IN BLOOD BY AUTOMATED COUNT: 12.7 % (ref 11.6–15.1)
HCT VFR BLD AUTO: 35.9 % (ref 34.8–46.1)
HGB BLD-MCNC: 11.8 G/DL (ref 11.5–15.4)
IMM GRANULOCYTES # BLD AUTO: 0.01 THOUSAND/UL (ref 0–0.2)
IMM GRANULOCYTES NFR BLD AUTO: 0 % (ref 0–2)
LYMPHOCYTES # BLD AUTO: 2.19 THOUSANDS/ΜL (ref 0.6–4.47)
LYMPHOCYTES NFR BLD AUTO: 30 % (ref 14–44)
MCH RBC QN AUTO: 29.9 PG (ref 26.8–34.3)
MCHC RBC AUTO-ENTMCNC: 32.9 G/DL (ref 31.4–37.4)
MCV RBC AUTO: 91 FL (ref 82–98)
MONOCYTES # BLD AUTO: 0.5 THOUSAND/ΜL (ref 0.17–1.22)
MONOCYTES NFR BLD AUTO: 7 % (ref 4–12)
NEUTROPHILS # BLD AUTO: 4.04 THOUSANDS/ΜL (ref 1.85–7.62)
NEUTS SEG NFR BLD AUTO: 56 % (ref 43–75)
NRBC BLD AUTO-RTO: 0 /100 WBCS
PLATELET # BLD AUTO: 181 THOUSANDS/UL (ref 149–390)
PMV BLD AUTO: 12.4 FL (ref 8.9–12.7)
RBC # BLD AUTO: 3.94 MILLION/UL (ref 3.81–5.12)
WBC # BLD AUTO: 7.2 THOUSAND/UL (ref 4.31–10.16)

## 2024-09-21 PROCEDURE — 85025 COMPLETE CBC W/AUTO DIFF WBC: CPT

## 2024-09-21 PROCEDURE — 84702 CHORIONIC GONADOTROPIN TEST: CPT

## 2024-09-21 PROCEDURE — 36415 COLL VENOUS BLD VENIPUNCTURE: CPT

## 2024-09-22 ENCOUNTER — HOSPITAL ENCOUNTER (EMERGENCY)
Facility: HOSPITAL | Age: 24
Discharge: HOME | End: 2024-09-22
Attending: EMERGENCY MEDICINE
Payer: COMMERCIAL

## 2024-09-22 ENCOUNTER — APPOINTMENT (OUTPATIENT)
Dept: ULTRASOUND IMAGING | Facility: HOSPITAL | Age: 24
End: 2024-09-22
Payer: COMMERCIAL

## 2024-09-22 VITALS
OXYGEN SATURATION: 100 % | DIASTOLIC BLOOD PRESSURE: 60 MMHG | HEIGHT: 62 IN | WEIGHT: 146.61 LBS | BODY MASS INDEX: 26.98 KG/M2 | SYSTOLIC BLOOD PRESSURE: 104 MMHG | HEART RATE: 75 BPM | TEMPERATURE: 97.8 F | RESPIRATION RATE: 18 BRPM

## 2024-09-22 DIAGNOSIS — O26.899 ABDOMINAL PAIN IN PREGNANCY: Primary | ICD-10-CM

## 2024-09-22 DIAGNOSIS — O20.8 SUBCHORIONIC HEMORRHAGE IN FIRST TRIMESTER: ICD-10-CM

## 2024-09-22 DIAGNOSIS — R10.9 ABDOMINAL PAIN IN PREGNANCY: Primary | ICD-10-CM

## 2024-09-22 LAB
ANION GAP SERPL CALCULATED.3IONS-SCNC: 8 MMOL/L (ref 4–13)
B-HCG SERPL-ACNC: ABNORMAL MIU/ML (ref 0–5)
BASOPHILS # BLD AUTO: 0.04 THOUSANDS/ΜL (ref 0–0.1)
BASOPHILS NFR BLD AUTO: 1 % (ref 0–1)
BILIRUB UR QL STRIP: NEGATIVE
BUN SERPL-MCNC: 6 MG/DL (ref 5–25)
CALCIUM SERPL-MCNC: 9.1 MG/DL (ref 8.4–10.2)
CHLORIDE SERPL-SCNC: 103 MMOL/L (ref 96–108)
CLARITY UR: CLEAR
CO2 SERPL-SCNC: 23 MMOL/L (ref 21–32)
COLOR UR: NORMAL
CREAT SERPL-MCNC: 0.38 MG/DL (ref 0.6–1.3)
EOSINOPHIL # BLD AUTO: 0.35 THOUSAND/ΜL (ref 0–0.61)
EOSINOPHIL NFR BLD AUTO: 4 % (ref 0–6)
ERYTHROCYTE [DISTWIDTH] IN BLOOD BY AUTOMATED COUNT: 12.7 % (ref 11.6–15.1)
GFR SERPL CREATININE-BSD FRML MDRD: 150 ML/MIN/1.73SQ M
GLUCOSE SERPL-MCNC: 80 MG/DL (ref 65–140)
GLUCOSE UR STRIP-MCNC: NEGATIVE MG/DL
HCT VFR BLD AUTO: 37.5 % (ref 34.8–46.1)
HGB BLD-MCNC: 12.1 G/DL (ref 11.5–15.4)
HGB UR QL STRIP.AUTO: NEGATIVE
IMM GRANULOCYTES # BLD AUTO: 0.02 THOUSAND/UL (ref 0–0.2)
IMM GRANULOCYTES NFR BLD AUTO: 0 % (ref 0–2)
KETONES UR STRIP-MCNC: NEGATIVE MG/DL
LEUKOCYTE ESTERASE UR QL STRIP: NEGATIVE
LYMPHOCYTES # BLD AUTO: 2.6 THOUSANDS/ΜL (ref 0.6–4.47)
LYMPHOCYTES NFR BLD AUTO: 32 % (ref 14–44)
MCH RBC QN AUTO: 29.6 PG (ref 26.8–34.3)
MCHC RBC AUTO-ENTMCNC: 32.3 G/DL (ref 31.4–37.4)
MCV RBC AUTO: 92 FL (ref 82–98)
MONOCYTES # BLD AUTO: 0.64 THOUSAND/ΜL (ref 0.17–1.22)
MONOCYTES NFR BLD AUTO: 8 % (ref 4–12)
NEUTROPHILS # BLD AUTO: 4.49 THOUSANDS/ΜL (ref 1.85–7.62)
NEUTS SEG NFR BLD AUTO: 55 % (ref 43–75)
NITRITE UR QL STRIP: NEGATIVE
NRBC BLD AUTO-RTO: 0 /100 WBCS
PH UR STRIP.AUTO: 7 [PH]
PLATELET # BLD AUTO: 221 THOUSANDS/UL (ref 149–390)
PMV BLD AUTO: 11.7 FL (ref 8.9–12.7)
POTASSIUM SERPL-SCNC: 3.8 MMOL/L (ref 3.5–5.3)
PROT UR STRIP-MCNC: NEGATIVE MG/DL
RBC # BLD AUTO: 4.09 MILLION/UL (ref 3.81–5.12)
SODIUM SERPL-SCNC: 134 MMOL/L (ref 135–147)
SP GR UR STRIP.AUTO: 1.01 (ref 1–1.03)
UROBILINOGEN UR STRIP-ACNC: <2 MG/DL
WBC # BLD AUTO: 8.14 THOUSAND/UL (ref 4.31–10.16)

## 2024-09-22 PROCEDURE — 87086 URINE CULTURE/COLONY COUNT: CPT | Performed by: EMERGENCY MEDICINE

## 2024-09-22 PROCEDURE — 99284 EMERGENCY DEPT VISIT MOD MDM: CPT | Performed by: EMERGENCY MEDICINE

## 2024-09-22 PROCEDURE — 85025 COMPLETE CBC W/AUTO DIFF WBC: CPT | Performed by: EMERGENCY MEDICINE

## 2024-09-22 PROCEDURE — 99284 EMERGENCY DEPT VISIT MOD MDM: CPT

## 2024-09-22 PROCEDURE — 36415 COLL VENOUS BLD VENIPUNCTURE: CPT | Performed by: EMERGENCY MEDICINE

## 2024-09-22 PROCEDURE — 81003 URINALYSIS AUTO W/O SCOPE: CPT | Performed by: EMERGENCY MEDICINE

## 2024-09-22 PROCEDURE — 84702 CHORIONIC GONADOTROPIN TEST: CPT | Performed by: EMERGENCY MEDICINE

## 2024-09-22 PROCEDURE — 80048 BASIC METABOLIC PNL TOTAL CA: CPT | Performed by: EMERGENCY MEDICINE

## 2024-09-22 PROCEDURE — 76815 OB US LIMITED FETUS(S): CPT

## 2024-09-22 NOTE — DISCHARGE INSTRUCTIONS
A subchorionic hemorrhage (IRISH), or hematoma, is a collection of blood between the placenta and the uterus. IRISH usually develops late in the first trimester. The bleeding usually reabsorbs into your body by 20 weeks of pregnancy. Most pregnancies progress without problems. You may have occasional spotting or light bleeding throughout your pregnancy.    DISCHARGE INSTRUCTIONS:  Return to the emergency department if:  You have a fever.  You have abdominal pain.  You have a sudden increase in bleeding.  Contact your healthcare provider if:  Your bleeding has increased.  You have questions or concerns about your condition or care.  Pelvic rest:  Do not have sex, douche, or use tampons. Do not strain or lift heavy objects. These activities may cause contractions or infection and put you or your baby at risk. You may need to rest more than usual. Do daily activities as directed.    Follow up with your healthcare provider as directed:  You may need to return frequently for ultrasounds. Write down your questions so you remember to ask them during your visits.      A  personal message from Dr. Higinio West,  Thank you so much for allowing me to care for you today.    I pride myself in the care and attention I give all my patients.  I hope you were a witness to this tonight.   If for any reason your condition does not improve or worsens, or you have a question that was not answered during your visit you can feel free to text me on my personal phone #  # [phone].   I will answer to your message and continue your care past your emergency room visit.     Please understand that although you are being discharged because your condition has been deemed stable and able to be managed on an outpatient setting. However your condition may worsen as part of the natural progression of the illness/condition, if this occurs please come back to the emergency department for a repeat evaluation.

## 2024-09-22 NOTE — ED NOTES
Pt left department prior to blood work collection for ABO/Rh.     Vika Veloz RN  09/22/24 1400    
All other review of systems negative, except as noted in HPI

## 2024-09-24 LAB — BACTERIA UR CULT: NORMAL

## 2024-09-25 NOTE — ED PROVIDER NOTES
1. Abdominal pain in pregnancy    2. Subchorionic hemorrhage in first trimester      ED Disposition       ED Disposition   Discharge    Condition   Stable    Date/Time   Sun Sep 22, 2024  1:53 PM    Comment   My Flannery discharge to home/self care.                   Assessment & Plan       Medical Decision Making  I have considered a broad diagnosis for abdominal pain that includes: Appendicitis, diverticulitis, colitis, cholecystitis, biliary colic, volvulus, small bowel obstruction, ileus, UTI, gastritis/PUD and other abdominal pathology.    Given the patient's physical exam and work-up today, there is low although not zero suspicion for these diagnoses.  Patient was advised and given appropriate return precautions, including continued or new fever, persistent vomiting, worsening abdominal pain, or any other concerning signs or symptoms especially if there are new.      Problems Addressed:  Abdominal pain in pregnancy: acute illness or injury  Subchorionic hemorrhage in first trimester: acute illness or injury    Amount and/or Complexity of Data Reviewed  Labs: ordered. Decision-making details documented in ED Course.  Radiology: ordered.    Risk  OTC drugs.                ED Course as of 09/24/24 2144   Sun Sep 22, 2024   1339 WBC: 8.14   1339 Hemoglobin: 12.1   1352 Leukocytes, UA: Negative   1352 Nitrite, UA: Negative       Medications - No data to display    History of Present Illness       My Flannery is a 23 y.o.  year old female  Past Medical History:  No date: Asthma      Comment:  albuterol prn  No date: Depression      Comment:  intermittent   no meds  No date: Varicella      Comment:  had vaccines  Social History    Tobacco Use      Smoking status: Never      Smokeless tobacco: Never    Vaping Use      Vaping status: Never Used    Alcohol use: Yes      Comment: occasionally    Drug use: Not Currently    Patient presents with:  Abdominal Pain Pregnant: Patient arrived to ER c/o abd pain/cramping  that started 2-3 weeks, worsened the last few days, pain is consistent.   Patient is 7 weeks pregnant. Patient denies bleeding.       History obtained directly from the PATIENT              History provided by:  Patient      Review of Systems   Constitutional:  Negative for chills and fever.   HENT:  Negative for ear pain and sore throat.    Eyes:  Negative for pain and visual disturbance.   Respiratory:  Negative for cough and shortness of breath.    Cardiovascular:  Negative for chest pain and palpitations.   Gastrointestinal:  Negative for abdominal pain and vomiting.   Genitourinary:  Positive for pelvic pain. Negative for dysuria, hematuria and vaginal bleeding.   Musculoskeletal:  Negative for arthralgias and back pain.   Skin:  Negative for color change and rash.   Neurological:  Negative for seizures and syncope.   All other systems reviewed and are negative.          Objective     ED Triage Vitals [09/22/24 1231]   Temperature Pulse Blood Pressure Respirations SpO2 Patient Position - Orthostatic VS   97.8 °F (36.6 °C) 75 104/60 18 100 % Sitting      Temp Source Heart Rate Source BP Location FiO2 (%) Pain Score    Tympanic Monitor Left arm -- --        Physical Exam  Vitals and nursing note reviewed.   Constitutional:       General: She is not in acute distress.     Appearance: Normal appearance. She is well-developed.   HENT:      Head: Normocephalic and atraumatic.      Right Ear: External ear normal.      Left Ear: External ear normal.   Eyes:      Conjunctiva/sclera: Conjunctivae normal.   Cardiovascular:      Rate and Rhythm: Normal rate and regular rhythm.      Heart sounds: No murmur heard.  Pulmonary:      Effort: Pulmonary effort is normal. No respiratory distress.      Breath sounds: Normal breath sounds.   Abdominal:      Palpations: Abdomen is soft.      Tenderness: There is no abdominal tenderness.   Musculoskeletal:         General: No swelling.      Cervical back: Neck supple.   Skin:      General: Skin is warm and dry.      Capillary Refill: Capillary refill takes less than 2 seconds.   Neurological:      General: No focal deficit present.      Mental Status: She is alert and oriented to person, place, and time.   Psychiatric:         Mood and Affect: Mood normal.         Thought Content: Thought content normal.         Labs Reviewed   BASIC METABOLIC PANEL - Abnormal       Result Value    Sodium 134 (*)     Potassium 3.8      Chloride 103      CO2 23      ANION GAP 8      BUN 6      Creatinine 0.38 (*)     Glucose 80      Calcium 9.1      eGFR 150      Narrative:     National Kidney Disease Foundation guidelines for Chronic Kidney Disease (CKD):     Stage 1 with normal or high GFR (GFR > 90 mL/min/1.73 square meters)    Stage 2 Mild CKD (GFR = 60-89 mL/min/1.73 square meters)    Stage 3A Moderate CKD (GFR = 45-59 mL/min/1.73 square meters)    Stage 3B Moderate CKD (GFR = 30-44 mL/min/1.73 square meters)    Stage 4 Severe CKD (GFR = 15-29 mL/min/1.73 square meters)    Stage 5 End Stage CKD (GFR <15 mL/min/1.73 square meters)  Note: GFR calculation is accurate only with a steady state creatinine   HCG, QUANTITATIVE - Abnormal    HCG, Quant 218,654.0 (*)     Narrative:      Expected Ranges:    HCG results between 5.0 and 25.0 mIU/mL may be indicative of early pregnancy but should be interpreted in light of the total clinical presentation.    HCG can rise to detectable levels in danni and post menopausal women (0-11.6 mIU/mL).     Approximate               Approximate HCG  Gestation age          Concentration ( mIU/mL)  _____________          ______________________   Weeks                      HCG values  0.2-1                       5-50  1-2                           2-3                         100-5000  3-4                         500-45478  4-5                         1000-17416  5-6                         [id number]  6-8                         [id number]  8-12                         [id number]     URINE CULTURE    Urine Culture <10,000 cfu/ml     UA W REFLEX TO MICROSCOPIC WITH REFLEX TO CULTURE    Color, UA Light Yellow      Clarity, UA Clear      Specific Gravity, UA 1.009      pH, UA 7.0      Leukocytes, UA Negative      Nitrite, UA Negative      Protein, UA Negative      Glucose, UA Negative      Ketones, UA Negative      Urobilinogen, UA <2.0      Bilirubin, UA Negative      Occult Blood, UA Negative      URINE COMMENT       CBC AND DIFFERENTIAL    WBC 8.14      RBC 4.09      Hemoglobin 12.1      Hematocrit 37.5      MCV 92      MCH 29.6      MCHC 32.3      RDW 12.7      MPV 11.7      Platelets 221      nRBC 0      Segmented % 55      Immature Grans % 0      Lymphocytes % 32      Monocytes % 8      Eosinophils Relative 4      Basophils Relative 1      Absolute Neutrophils 4.49      Absolute Immature Grans 0.02      Absolute Lymphocytes 2.60      Absolute Monocytes 0.64      Eosinophils Absolute 0.35      Basophils Absolute 0.04       US OB pregnancy limited with transvaginal   Final Interpretation by Paulino Espinoza MD (09/22 1338)      Single live intrauterine gestation at 8 weeks 0 days (range +/- 3 days).      PEPE of 05/04/2025.      Subchorionic hemorrhage measuring up to 16 mm.      Workstation performed: [id number]             Procedures    ED Medication and Procedure Management   Prior to Admission Medications   Prescriptions Last Dose Informant Patient Reported? Taking?   Cholecalciferol (Vitamin D3) 1.25 MG (01598 UT) CAPS   No No   Sig: TAKE 1 CAPSULE (50,000 UNITS TOTAL) BY MOUTH ONCE A WEEK FOR 12 DOSES   albuterol (PROVENTIL HFA,VENTOLIN HFA) 90 mcg/act inhaler   No No   Sig: INHALE 2 PUFFS EVERY 6 HOURS AS NEEDED FOR WHEEZING   ferrous sulfate 324 (65 Fe) mg   No No   Sig: Take 1 tablet (324 mg total) by mouth daily before breakfast   ibuprofen (MOTRIN) 600 mg tablet   No No   Sig: Take 1 tablet (600 mg total) by mouth every 6 (six) hours as needed for mild pain    loratadine (CLARITIN) 10 mg tablet   No No   Sig: Take 1 tablet (10 mg total) by mouth daily   norethindrone (MICRONOR) 0.35 MG tablet   No No   Sig: TAKE 1 TABLET BY MOUTH EVERY DAY      Facility-Administered Medications: None     Discharge Medication List as of 9/22/2024  1:55 PM        CONTINUE these medications which have NOT CHANGED    Details   albuterol (PROVENTIL HFA,VENTOLIN HFA) 90 mcg/act inhaler INHALE 2 PUFFS EVERY 6 HOURS AS NEEDED FOR WHEEZING, Normal      Cholecalciferol (Vitamin D3) 1.25 MG (79231 UT) CAPS TAKE 1 CAPSULE (50,000 UNITS TOTAL) BY MOUTH ONCE A WEEK FOR 12 DOSES, Starting Mon 2/26/2024, Until Tue 5/14/2024, Normal      ferrous sulfate 324 (65 Fe) mg Take 1 tablet (324 mg total) by mouth daily before breakfast, Starting Mon 11/6/2023, Until Sun 2/4/2024, Normal      ibuprofen (MOTRIN) 600 mg tablet Take 1 tablet (600 mg total) by mouth every 6 (six) hours as needed for mild pain, Starting u 9/7/2023, Normal      loratadine (CLARITIN) 10 mg tablet Take 1 tablet (10 mg total) by mouth daily, Starting Tue 4/30/2024, Normal      norethindrone (MICRONOR) 0.35 MG tablet TAKE 1 TABLET BY MOUTH EVERY DAY, Normal           No discharge procedures on file.     Higinio West MD  09/24/24 3156

## 2024-10-01 ENCOUNTER — ULTRASOUND (OUTPATIENT)
Dept: OBGYN CLINIC | Facility: CLINIC | Age: 24
End: 2024-10-01
Payer: COMMERCIAL

## 2024-10-01 VITALS
SYSTOLIC BLOOD PRESSURE: 110 MMHG | WEIGHT: 148 LBS | BODY MASS INDEX: 27.23 KG/M2 | DIASTOLIC BLOOD PRESSURE: 68 MMHG | HEIGHT: 62 IN

## 2024-10-01 DIAGNOSIS — O46.8X1 SUBCHORIONIC HEMATOMA IN FIRST TRIMESTER, SINGLE OR UNSPECIFIED FETUS: ICD-10-CM

## 2024-10-01 DIAGNOSIS — O41.8X10 SUBCHORIONIC HEMATOMA IN FIRST TRIMESTER, SINGLE OR UNSPECIFIED FETUS: ICD-10-CM

## 2024-10-01 DIAGNOSIS — N91.2 AMENORRHEA: Primary | ICD-10-CM

## 2024-10-01 DIAGNOSIS — Z3A.08 8 WEEKS GESTATION OF PREGNANCY: ICD-10-CM

## 2024-10-01 PROBLEM — O02.1 MISSED AB: Status: RESOLVED | Noted: 2023-09-07 | Resolved: 2024-10-01

## 2024-10-01 PROBLEM — N94.9 CMT (CERVICAL MOTION TENDERNESS): Status: RESOLVED | Noted: 2023-06-21 | Resolved: 2024-10-01

## 2024-10-01 PROBLEM — B96.89 BACTERIAL VAGINOSIS: Status: RESOLVED | Noted: 2023-10-03 | Resolved: 2024-10-01

## 2024-10-01 PROBLEM — N94.10 DYSPAREUNIA, FEMALE: Status: RESOLVED | Noted: 2023-06-21 | Resolved: 2024-10-01

## 2024-10-01 PROBLEM — N76.0 BACTERIAL VAGINOSIS: Status: RESOLVED | Noted: 2023-10-03 | Resolved: 2024-10-01

## 2024-10-01 PROCEDURE — 76817 TRANSVAGINAL US OBSTETRIC: CPT | Performed by: OBSTETRICS & GYNECOLOGY

## 2024-10-01 PROCEDURE — 99213 OFFICE O/P EST LOW 20 MIN: CPT | Performed by: OBSTETRICS & GYNECOLOGY

## 2024-10-01 NOTE — PROGRESS NOTES
"ASSESSMENT/PLAN    Early pregnancy at 8w6d with a calculated PEPE of 25 based on lmp    - Genetic screening options reviewed. She is interested in NIPT, so MFM referral placed  - Prenatal care reviewed  - Pregnancy precautions reviewed  - Prenatal Vitamin recommended.       RTO for OB interview and PN-1 visit    SUBJECTIVE:    My Flannery is a 23 y.o.  presenting today for verification of pregnancy.     Patient's last menstrual period was 2024 (exact date).    Menses are regular.  This pregnancy was not planned  but happy   She is accompanied by self .     Reports Nausea and fatigue  Denies bleeding, cramping is resolving, was seen in ED on 24 for abd pain   Subchorionic hemorrhage noted on that ultrasound      OB hx significant for: 3 AB's, anemia     Taking a prenatal vitamin, gummies     The following portions of the patient's history were reviewed and updated as appropriate: allergies, current medications, past family history, past medical history, past social history, past surgical history, and problem list.    OBJECTIVE:    /68 (BP Location: Left arm, Patient Position: Sitting, Cuff Size: Large)   Ht 5' 2\" (1.575 m)   Wt 67.1 kg (148 lb)   LMP 2024 (Exact Date)   BMI 27.07 kg/m²    "

## 2024-10-01 NOTE — PATIENT INSTRUCTIONS
"Valuable Online Resource:    [facility] pregnancy essential guide    [email or web address]      On the right side of the screen is a 50 page guide providing valuable information about your entire pregnancy.    On the left hand side of the site you will see several other links to great information and resources that [facility] offers     If you click on the tab that says \"Pregnancy and Birth Packet\" this opens another  guide to labor and delivery information as well as breast feeding information,  care, pediatricians, car seat safety and much more     The [facility] tab has a virtual tour of the new L&D unit, as well as valuable information about classes that are offered, breast feeding support, support groups and much more.     I highly recommend the virtual Breast Feeding class, very informational even if you have breast fed in the past. Check for available dates !    Click around and enjoy all we have to offer!    Please note that all information in regards to locations and visiting hours have not been updated due to COVID    Your delivery location is [facility] @ [address] [address]         Maternal Fetal Medicine     Nuchal Translucency ( NT) ultrasound is offered in the first trimester of pregnancy to assess your developing baby and look for any markers that may indicate a risk for certain chromosomal conditions such as Down's syndrome.  This ultrasound is offered to all pregnant women along with several options for blood screening.     During your ultrasound appointment the Perinatologist will discuss these options and together you can decide which screen is best for you.  We encourage you to view the following video prior to your appointment to learn more:  [email or web address]     Please check your individual insurance plan to determine if the screening is covered, requires prior " authorization or if you will incur any out of pocket cost.   It is also important to know if your insurance company has a preferred in network lab such as [facility] or [facility].     Below is list of CPT codes for blood screening options.   CPT codes are procedure codes that tell your insurance company what testing is being done.     In order for testing to be performed in a timely and efficient manner it is best if you have this information available before your first visit with our office.  Please note, [facility]'s genetic testing division is called [facility].     Sequential Screen - 2 part screen, CPT codes are dependent on the lab used:     [facility]    Part 1 code 98757,55710      Part 2 code 15288,56869,59431, 82127     [facility]            Part 1 code  53235                Part 2 code 57352        NIPT (cell free DNA testing)  CPT code 87363        NIPT testing through [facility]/ [facility] is called [id number].   Please use the  @ [employer]   > click estimate my cost>  pregnancy>   PzehfrpS12 plus  OR call # [phone] and speak to a .   Be sure to ask about the Every [employer] program for additional financial assistance.     NIPT testing through [facility] is called [facility].  Please use the  @ [employer].     If you have any questions please feel free to reach out to our office at [phone].  Genetic Counseling appointments are available for any patient but strongly encouraged for Moms 35 or older or patients with family history of genetic disorders. Patient Education     Pregnancy - The Third Month   About this topic   It is important for you to learn how to take care of yourself to help you have a healthy baby and safe delivery. It is good to have health care throughout your pregnancy.  The third month of your pregnancy starts around week 10 and lasts through week 13. By knowing how far along  you are, you can learn what is normal for this stage of your pregnancy and plan for what is next.  General   Your Body   During the third month of your pregnancy, here are some things you can expect.  You may:  Have less morning sickness or upset stomach. This is because the placenta has taken over some of the hormone production for the baby.  Start to gain weight. It is normal to gain about 1 to 3 pounds (.5 to 1.5 kg) in your first 3 months. Most moms gain about 25 to 35 pounds (11 to 16 kg) during their pregnancy. Talk to your doctor about how much weight you should gain.  Have glowing skin because of extra blood flow and hormones  Notice a dark line on your belly. This is normal. You may also be able to feel your uterus in your belly as it starts to grow.  Have more trouble sleeping at night  Have an increase in appetite  Have trouble breathing or have an increase in congestion  Have trouble with bowel movements  Be at a higher risk for getting a yeast infection because of the change in pH of your vagina  Have frequent mood swings.  Your baby’s growth and development:  Your baby's organs are formed and are starting to work together. Eyelids are closed and will stay that way to protect their eyes as they grow.  Their bones are growing. Your baby is able to open and close their mouth and starts to suck their thumb.  Your baby's genitals and reproductive organs are developing, but it is too soon to tell if your baby is a boy or girl with an ultrasound.  The heartbeat is easy to hear with a special tool at the doctor’s office.  Your baby is about 3 inches (8 cm) long and weighs about 1 ounce (30 gm). Your baby is about the size of a lemon.  Things to Think About   Avoid alcohol, drugs, tobacco products, and second hand smoke  Check with your doctor before taking any kind of drugs. Continue to take your vitamin with folic acid.  Eat small meals and drink more water to help with heartburn. Also go for a walk after  eating and avoid spicy, fried foods.  You may need to switch to another size or style of clothes as your baby grows. Be comfortable and know that the baby is well cushioned inside of you.  Stay healthy by eating good foods, exercising, and getting enough rest.  When do I need to call the doctor?   Not gaining any weight or losing weight  Belly pain or cramps that keeps you from eating or sleeping  Continuing to have too much upset stomach and throwing up  Period-like bleeding  Last Reviewed Date   2020-04-20  Consumer Information Use and Disclaimer   This generalized information is a limited summary of diagnosis, treatment, and/or medication information. It is not meant to be comprehensive and should be used as a tool to help the user understand and/or assess potential diagnostic and treatment options. It does NOT include all information about conditions, treatments, medications, side effects, or risks that may apply to a specific patient. It is not intended to be medical advice or a substitute for the medical advice, diagnosis, or treatment of a health care provider based on the health care provider's examination and assessment of a patient’s specific and unique circumstances. Patients must speak with a health care provider for complete information about their health, medical questions, and treatment options, including any risks or benefits regarding use of medications. This information does not endorse any treatments or medications as safe, effective, or approved for treating a specific patient. UpToDate, Inc. and its affiliates disclaim any warranty or liability relating to this information or the use thereof. The use of this information is governed by the Terms of Use, available at https://www.woltersAquapharm Biodiscoveryuwer.com/en/know/clinical-effectiveness-terms   Copyright   Copyright © 2024 UpToDate, Inc. and its affiliates and/or licensors. All rights reserved.

## 2024-10-14 NOTE — PATIENT INSTRUCTIONS
Congratulations!! Please review our Pregnancy Essential Guide and [facility] L&D Virtual tour from our networks website.     [facility] Pregnancy Essentials Guide  [facility] ([email or web address])     [facility] ([employer])

## 2024-10-15 ENCOUNTER — INITIAL PRENATAL (OUTPATIENT)
Dept: OBGYN CLINIC | Facility: CLINIC | Age: 24
End: 2024-10-15
Payer: COMMERCIAL

## 2024-10-15 VITALS — HEIGHT: 62 IN | BODY MASS INDEX: 27.23 KG/M2 | WEIGHT: 148 LBS

## 2024-10-15 DIAGNOSIS — Z34.81 PRENATAL CARE, SUBSEQUENT PREGNANCY, FIRST TRIMESTER: Primary | ICD-10-CM

## 2024-10-15 DIAGNOSIS — Z3A.08 8 WEEKS GESTATION OF PREGNANCY: ICD-10-CM

## 2024-10-15 DIAGNOSIS — Z36.9 ENCOUNTER FOR ANTENATAL SCREENING: ICD-10-CM

## 2024-10-15 DIAGNOSIS — Z31.430 ENCOUNTER OF FEMALE FOR TESTING FOR GENETIC DISEASE CARRIER STATUS FOR PROCREATIVE MANAGEMENT: ICD-10-CM

## 2024-10-15 PROCEDURE — 99211 OFF/OP EST MAY X REQ PHY/QHP: CPT

## 2024-10-15 NOTE — PROGRESS NOTES
OB INTAKE INTERVIEW  Patient is 23 y.o. who presents for OB intake at 10 6/7 wks  She is accompanied by herself during this encounter  The father of her baby Lauryn Ch is involved in the pregnancy and is 23 years old.      Last Menstrual Period: 24  Ultrasound: Measured 9 weeks 1 days on 10/1/24  Estimated Date of Delivery: 25 confirmed by 9 week US    Signs/Symptoms of Pregnancy  Current pregnancy symptoms: Tiredness and nausea  Constipation YES  Headaches no  Cramping/spotting YES- Mild cramping on occasion  PICA cravings no    Diabetes-  Body mass index is 27.07 kg/m².  If patient has 1 or more, please order early 1 hour GTT  History of GDM no  BMI >35 no  History of PCOS or current metformin use no  History of LGA/macrosomic infant (4000g/9lbs) no    If patient has 2 or more, please order early 1 hour GTT  BMI>30 no  AMA no  First degree relative with type 2 diabetes no  History of chronic HTN, hyperlipidemia, elevated A1C no  High risk race (, , ,  or ) YES    Hypertension- if you answer yes to any of the following, please order baseline preeclampsia labs (cbc, comprehensive metabolic panel, urine protein creatinine ratio, uric acid)  History of of chronic HTN no  History of gestational HTN no  History of preeclampsia, eclampsia, or HELLP syndrome no  History of diabetes no  History of lupus,sjogrens syndrome, kidney disease no    Thyroid- if yes order TSH with reflex T4  History of thyroid disease no    Bleeding Disorder or Hx of DVT-patient or first degree relative with history of. Order the following if not done previously.   (Factor V, antithrombin III, prothrombin gene mutation, protein C and S Ag, lupus anticoagulant, anticardiolipin, beta-2 glycoprotein)   no    OB/GYN-  History of abnormal pap smear no       Date of last pap smear 2022  History of HPV no  History of Herpes/HSV no  History of other STI (gonorrhea,  chlamydia, trich) no  History of prior  YES  History of prior  no  History of  delivery prior to 36 weeks 6 days no  History of Varicella or Vaccination Had vaccine  History of blood transfusion no  Ok for blood transfusion YES    Substance screening-   History of tobacco use no  Currently using tobacco no  Substance Use Screen Level (N/A, LOW, HIGH) N/A    MRSA Screening-   Does the pt have a hx of MRSA? no    Immunizations:  Influenza vaccine given this season Not sure if she wants it  Discussed Tdap vaccine YES  Discussed COVID Vaccine YES    Genetic/[facility]-  Do you or your partner have a history of any of the following in yourselves or first degree relatives?  Cystic fibrosis no  Spinal muscular atrophy no  Hemoglobinopathy/Sickle Cell/Thalassemia no  Fragile X Intellectual Disability no      If no, discuss Carrier Screening being completed once in a lifetime as a standard of care lab test. Place orders for Cystic Fibrosis Gene Test ([id number]) and Spinal Muscular Atrophy DNA ([id number])      Appointment for Nuchal Translucency Ultrasound at [facility] scheduled for 10/24/24      Interview education  [facility] Pregnancy Essentials Book reviewed, discussed and attached to their AVS YES    Nurse/Family Partnership- patient may qualify NO; referral placed NO    Prenatal lab work scripts YES  Extra labs ordered:  CF and SMA screening    Aspirin/Preeclampsia Screen    Risk Level Risk Factor Recommendation   LOW Prior Uncomplicated full-term delivery YES No Aspirin recommendation        MODERATE Nulliparity no Recommend low-dose aspirin if     BMI>30 no 2 or more moderate risk factors    Family History Preeclampsia (mother/sister) YES- Pt's Mother     35yr old or greater no     Black Race, Concern for SDOH/Low Socioeconomic no     IVF Pregnancy  no     Personal History Risks (low birth weight, prior adverse preg outcome, >10yr preg interval) no         HIGH History of Preeclampsia no Recommend low-dose aspirin if      Multifetal gestation no 1 or more high risk factors    Chronic HTN no     Type 1 or 2 Diabetes no     Renal Disease no     Autoimmune Disease  no      Contraindications to ASA therapy:  NSAID/ ASA allergy: no  Nasal polyps: no  Asthma with history of ASA induced bronchospasm: no  Relative contraindications:  History of GI bleed: no  Active peptic ulcer disease: no  Severe hepatic dysfunction: no    Patient should be recommended to take ASA 162mg during this pregnancy from 12-36wks to lower her risk of preeclampsia: N/A      The patient has a history now or in prior pregnancy notable for:  Depression-EPDS-4      Details that I feel the provider should be aware of: This is a planned and welcomed pregnancy for parents. Patient has been experiencing nausea and constipation. OTC meds and diet were discussed. Pt has a history of depression and is not currently on any meds. Pt knows when to call Doctor and how to contact her nurse navigator. Patient verbalized understanding.Patient knows to have lab orders completed before next appointment.          PN1 visit scheduled. The patient was oriented to our practice, the navigator role, reviewed delivering physicians and [facility] for Delivery. All questions were answered.    Interviewed by: Toya Noyola RN

## 2024-10-16 ENCOUNTER — NURSE TRIAGE (OUTPATIENT)
Dept: OTHER | Facility: OTHER | Age: 24
End: 2024-10-16

## 2024-10-16 NOTE — TELEPHONE ENCOUNTER
"Has been having intermittent cramping throughout this pregnancy. However, starting 3-4 hours ago cramping seems to be getting worse, pain moderate, feels like period cramping. No other symptoms. No spotting or leaking fluids. No fever. Advised to be seen within 24 hours as per protocol and contact her OB's office in am with ER/call back precautions if pain becomes more continuous and less cramping in nature, or if pain becomes severe, if she has bleeding or leaking fluid, or any other concerns. Pt may still go to ER anyway just because she is worried, but she will wait a little bit to see if it subsides. Encouraged pt to call back with any changes, questions or concerns. Pt verbalized understanding.     Reason for Disposition   [1] Intermittent lower abdominal pain (e.g., cramping) AND [2] present > 24 hours    Answer Assessment - Initial Assessment Questions  1. LOCATION: \"Where does it hurt?\"       Like uterus- period cramping, but mainly feels it on left side    2. RADIATION: \"Does the pain shoot anywhere else?\" (e.g., chest, back, shoulder)      no    3. ONSET: \"When did the pain begin?\" (e.g., minutes, hours or days ago)       3-4 hours ago    4. ONSET: \"Gradual or sudden onset?\"      gradual    5. PATTERN \"Does the pain come and go, or has it been constant since it started?\"       Cramping nonstop    6. SEVERITY: \"How bad is the pain?\" \"What does it keep you from doing?\"  (e.g., Scale 1-10; mild, moderate, or severe)      6/10    7. RECURRENT SYMPTOM: \"Have you ever had this type of stomach pain before?\" If Yes, ask: \"When was the last time?\" and \"What happened that time?\"       You've cramping on off during this pregnancy    8. CAUSE: \"What do you think is causing the stomach pain?\"      Not sure    9. RELIEVING/AGGRAVATING FACTORS: \"What makes it better or worse?\" (e.g., antacids, bowel movement, movement)      No precipitating or alleviating factors    10. OTHER SYMPTOMS: \"Do you have any other symptoms?\" " "(e.g., back pain, diarrhea, fever, urination pain, vaginal bleeding, vaginal discharge, vomiting)        No fever no bleeding no spotting no leaking fluid    11. PEPE: \"What date are you expecting to deliver?\"        11w0d 5/7/2025    Protocols used: Pregnancy - Abdominal Pain Less Than 20 Weeks EGA-Adult-AH    "

## 2024-10-17 ENCOUNTER — ULTRASOUND (OUTPATIENT)
Dept: OBGYN CLINIC | Facility: MEDICAL CENTER | Age: 24
End: 2024-10-17
Payer: COMMERCIAL

## 2024-10-17 VITALS
DIASTOLIC BLOOD PRESSURE: 64 MMHG | WEIGHT: 148 LBS | BODY MASS INDEX: 27.23 KG/M2 | SYSTOLIC BLOOD PRESSURE: 110 MMHG | HEIGHT: 62 IN

## 2024-10-17 DIAGNOSIS — Z12.4 CERVICAL CANCER SCREENING: ICD-10-CM

## 2024-10-17 DIAGNOSIS — Z11.3 SCREEN FOR STD (SEXUALLY TRANSMITTED DISEASE): ICD-10-CM

## 2024-10-17 DIAGNOSIS — R10.9 CRAMPING COMPLICATING PREGNANCY, ANTEPARTUM: Primary | ICD-10-CM

## 2024-10-17 DIAGNOSIS — O26.899 CRAMPING COMPLICATING PREGNANCY, ANTEPARTUM: Primary | ICD-10-CM

## 2024-10-17 DIAGNOSIS — O41.8X10 SUBCHORIONIC HEMATOMA IN FIRST TRIMESTER, SINGLE OR UNSPECIFIED FETUS: ICD-10-CM

## 2024-10-17 DIAGNOSIS — Z3A.08 8 WEEKS GESTATION OF PREGNANCY: ICD-10-CM

## 2024-10-17 DIAGNOSIS — O46.8X1 SUBCHORIONIC HEMATOMA IN FIRST TRIMESTER, SINGLE OR UNSPECIFIED FETUS: ICD-10-CM

## 2024-10-17 DIAGNOSIS — Z12.4 SCREENING FOR CERVICAL CANCER: ICD-10-CM

## 2024-10-17 PROCEDURE — 99214 OFFICE O/P EST MOD 30 MIN: CPT | Performed by: CLINICAL NURSE SPECIALIST

## 2024-10-17 PROCEDURE — 87591 N.GONORRHOEAE DNA AMP PROB: CPT | Performed by: CLINICAL NURSE SPECIALIST

## 2024-10-17 PROCEDURE — 87624 HPV HI-RISK TYP POOLED RSLT: CPT | Performed by: CLINICAL NURSE SPECIALIST

## 2024-10-17 PROCEDURE — 76817 TRANSVAGINAL US OBSTETRIC: CPT | Performed by: CLINICAL NURSE SPECIALIST

## 2024-10-17 PROCEDURE — 87491 CHLMYD TRACH DNA AMP PROBE: CPT | Performed by: CLINICAL NURSE SPECIALIST

## 2024-10-17 PROCEDURE — G0145 SCR C/V CYTO,THINLAYER,RESCR: HCPCS | Performed by: SPECIALIST

## 2024-10-17 PROCEDURE — G0124 SCREEN C/V THIN LAYER BY MD: HCPCS | Performed by: SPECIALIST

## 2024-10-17 NOTE — PROGRESS NOTES
Assessment/Plan:       1. Cramping complicating pregnancy, antepartum  Assessment & Plan:  Intermittent cramping - worse yesterday afternoon. But now resolved.  Exam with closed cervix, normal appearing d/c  US with + FHR and nml CL. Resolving known IRISH- getting smaller  GC/CT collected (also collected pap since doing exam today and hasn't had formal Prenatal exam yet.)  All reassuring findings. No evidence of impending AB      Orders:  -     AMB US OB < 14 weeks single or first gestation level 1  2. Screen for STD (sexually transmitted disease)  -     Chlamydia/GC amplified DNA by PCR  3. Cervical cancer screening  4. 8 weeks gestation of pregnancy  Assessment & Plan:  + FHR on US.   Keep appts for prenatal exam and MFM appts   5. Subchorionic hematoma in first trimester, single or unspecified fetus  Assessment & Plan:  Present but resolving. Now 0.60 x 0.47 cm  No c/o bleeding  6. Screening for cervical cancer  -     Liquid-based pap, screening          Subjective:      Patient ID: My Flannery is a 23 y.o. female. She is here for Abdominal Pain (Patient was having cramping yesterday(10/16/24) felt like periods cramps mostly on her right side but has gone away since )    HPI  Pt with early pregnancy problem   11w1d   Has had some intermittent cramping throughout the pregnancy. Was actually seen in ED  and had essentially a normal US - SLIUP with small IRISH. S=D.  No other concerning findings while there.     Dating US 10/1  good interval growth. No mention of IRISH     She reports yesterday afternoon the cramping become more consistent and seemed more intense.  No VB  Pain is more right sided than left.   Presently w/o pain. Resolved with rest.     Menstrual History:  Patient's last menstrual period was 2024 (exact date).        The following portions of the patient's history were reviewed and updated as appropriate: allergies, current medications, past family history, past medical history, past  "social history, past surgical history, and problem list.    Review of Systems  See HPI for pertinent positives          Objective:    /64 (BP Location: Left arm, Patient Position: Sitting, Cuff Size: Standard)   Ht 5' 2\" (1.575 m)   Wt 67.1 kg (148 lb)   LMP 2024 (Exact Date)   BMI 27.07 kg/m²      Physical Exam  Constitutional:       General: She is not in acute distress.     Appearance: Normal appearance.   Genitourinary:      Urethral meatus normal.      No lesions in the vagina.      Right Labia: No rash, lesions or skin changes.     Left Labia: No lesions, skin changes or rash.     No vaginal discharge, erythema, tenderness, bleeding or ulceration.        Right Adnexa: not tender, not full and no mass present.     Left Adnexa: not tender, not full and no mass present.     No cervical motion tenderness, discharge, friability or lesion.      Uterus is not enlarged (mildly enlarged- gravid- 11-12 wk size) or tender.      Bladder is not tender.       Pelvic exam was performed with patient in the lithotomy position.   Rectum:      No external hemorrhoid.   HENT:      Head: Normocephalic.   Cardiovascular:      Rate and Rhythm: Normal rate.   Pulmonary:      Effort: Pulmonary effort is normal.   Neurological:      Mental Status: She is alert and oriented to person, place, and time.   Psychiatric:         Mood and Affect: Mood normal.         Behavior: Behavior normal.   Vitals reviewed.     FIRST TRIMESTER OBSTETRIC ULTRASOUND     Patient's last menstrual period was 2024 (exact date).      INDICATION: cramping R>L     TECHNIQUE:   Transvaginal imaging was performed to assess the fetal cardiac activity     FINDINGS:  A single intrauterine gestation is identified  Mean Crown-Rump Length:  not assessed today  Cardiac activity is detected at 183.     Adnexa:  No adnexal mass or pathologic cyst.  Cul de Sac:  No significant free fluid identified  Cervical length 3.82 cm    "   IMPRESSION:  Single intrauterine pregnancy 11w1d  Fetal cardiac activity detected.  No adnexal masses seen.  Resolving IRISH  Normal CL    Ultrasound Probe Disinfection    A transvaginal ultrasound was performed.   Prior to use, disinfection was performed with High Level Dis. Probe serial number [id number]:  [id number] was used    ANGIE Metzger  10/17/24  12:28 PM

## 2024-10-17 NOTE — ASSESSMENT & PLAN NOTE
Intermittent cramping - worse yesterday afternoon. But now resolved.  Exam with closed cervix, normal appearing d/c  US with + FHR and nml CL. Resolving known IRISH- getting smaller  GC/CT collected (also collected pap since doing exam today and hasn't had formal Prenatal exam yet.)  All reassuring findings. No evidence of impending AB

## 2024-10-19 LAB
C TRACH DNA SPEC QL NAA+PROBE: NEGATIVE
N GONORRHOEA DNA SPEC QL NAA+PROBE: NEGATIVE

## 2024-10-24 ENCOUNTER — ROUTINE PRENATAL (OUTPATIENT)
Dept: PERINATAL CARE | Facility: OTHER | Age: 24
End: 2024-10-24
Payer: COMMERCIAL

## 2024-10-24 VITALS
BODY MASS INDEX: 27.53 KG/M2 | HEIGHT: 62 IN | DIASTOLIC BLOOD PRESSURE: 50 MMHG | HEART RATE: 93 BPM | SYSTOLIC BLOOD PRESSURE: 90 MMHG | WEIGHT: 149.6 LBS

## 2024-10-24 DIAGNOSIS — Z3A.12 12 WEEKS GESTATION OF PREGNANCY: ICD-10-CM

## 2024-10-24 DIAGNOSIS — Z03.72 SUSPECTED PROBLEM WITH PLACENTA NOT FOUND: Primary | ICD-10-CM

## 2024-10-24 DIAGNOSIS — Z36.0 ENCOUNTER FOR ANTENATAL SCREENING FOR CHROMOSOMAL ANOMALIES: ICD-10-CM

## 2024-10-24 DIAGNOSIS — Z36.82 NUCHAL TRANSLUCENCY OF FETUS ON PRENATAL ULTRASOUND: ICD-10-CM

## 2024-10-24 LAB
HPV HR 12 DNA CVX QL NAA+PROBE: NEGATIVE
HPV16 DNA CVX QL NAA+PROBE: NEGATIVE
HPV18 DNA CVX QL NAA+PROBE: NEGATIVE
LAB AP GYN PRIMARY INTERPRETATION: ABNORMAL
LAB AP LMP: ABNORMAL
Lab: ABNORMAL
PATH INTERP SPEC-IMP: ABNORMAL

## 2024-10-24 PROCEDURE — 36415 COLL VENOUS BLD VENIPUNCTURE: CPT | Performed by: OBSTETRICS & GYNECOLOGY

## 2024-10-24 PROCEDURE — 76801 OB US < 14 WKS SINGLE FETUS: CPT | Performed by: OBSTETRICS & GYNECOLOGY

## 2024-10-24 PROCEDURE — 76813 OB US NUCHAL MEAS 1 GEST: CPT | Performed by: OBSTETRICS & GYNECOLOGY

## 2024-10-24 PROCEDURE — 99243 OFF/OP CNSLTJ NEW/EST LOW 30: CPT | Performed by: NURSE PRACTITIONER

## 2024-10-24 NOTE — LETTER
2024     Татьяна Wakefield MD  [address]  [address] [place] [id number]    Patient: My Flannery   YOB: 2000   Date of Visit: 10/24/2024       Dear Dr. Wakefield:    Thank you for referring My Flannery to me for evaluation. Below are my notes for this consultation.    If you have questions, please do not hesitate to call me. I look forward to following your patient along with you.         Sincerely,        Tali Garcia MD        CC: No Recipients    Tali Garcia MD  10/24/2024  4:09 PM  Sign when Signing Visit  OFFICE CONSULT      Dear Dr. Prather,       Thank you for requesting a  consultation on your patient My Flannery for the following indications:  Genetic screening    History  Medications: Prenatal vitamins, ferrous sulfate, Claritin and albuterol  Allergies to medications: No known drug allergies  Past medical history: Asthma, depression (not currently on medication) and anemia  Past surgical history: Denies  Past obstetrical history: .  In 2021 she had a term vaginal delivery of a female  weighing 7 pounds 3 ounces. She denies pregnancy complications.  She has a history of 2 elective terminations and one spontaneous miscarriage that did not require surgical intervention.  Social history: Denies current use of alcohol, tobacco or drugs of abuse  First generation family history: Noncontributory.    Ultrasound findings:  The ultrasound shows a fetus concordant with dates. The nasal bone and nuchal translucency appear normal. No malformations are seen on today's early ultrasound.       She does not report any vaginal bleeding or uterine cramping or contractions.      Specific counseling was provided on the following problems:  1. We discussed the options for genetic screening which include invasive testing on the fetal placenta or on fetal skin cells within the amniotic fluid and compared this to  noninvasive testing which includes cell free DNA screening and the sequential screen.  We reviewed the risks, the benefits and the limitations of each.  In the end patient chose to complete the cell free DNA screen.    2.  We reviewed her history of intermittent asthma. Asthma complicates 4-8% of pregnancies. The majority of women with asthma experience stability or improvement in symptoms in pregnancy, while approximately 1/3 experience worsening. Need for a rescue inhaler more than twice weekly indicates suboptimal asthma control and need for escalation in therapy.  The majority of asthma medications are safe for pregnancy, and disease control is important for maternal and fetal health in pregnancy. If her symptoms remain well-controlled in pregnancy, no additional obstetric surveillance is indicated. However, if control is poor, there is a risk of fetal growth restriction, and evaluation of fetal growth in the second half of pregnancy is likely warranted. Prevention of respiratory morbidity is particularly important in pregnancy. Annual influenza vaccination is  recommended, as is pneumococcal vaccination if not performed previously.    3.  We reviewed current recommendations regarding  Flu, COVID and RSV (third trimester) vaccines by the American College of Obstetricians and Gynecologists and the Society for Maternal-Fetal Medicine. We discussed reassuring pregnancy outcome information after vaccination. We discussed the increased severity of infections and the resultant maternal and fetal complications that can arise with a severe infection including  labor.  Vaccines have been found to generate  antibodies in pregnant and lactating women similar to that observed in non-pregnant women. Vaccine-induced antibody levels were significantly greater than the levels found in response to natural infection. Immune transfer of these antibodies to neonates is found to occur via the placenta and breast  milk.        Future tests recommended:  The results of her NIPT will return in 7-10 days and her OB office will order an MSAFP screen at 16-18 weeks to screen for spina bifida.       Future ultrasounds ordered today:   Fetal Level II ultrasound imaging is recommended at 19-20 weeks' gestation.      Split-shared decision-making between ANGIE Monet and myself was utilized, with the majority of the time spent by JAZMINE Monet.  Medical decision-making for this encounter was moderate (diagnosis moderate, data moderate and risk moderate).    I reviewed the ultrasound pictures and recommended the medical decision making transcribed in the care of this patient.      Tali Zambrano  10/24/2024  3:40 PM  Sign when Signing Visit  Patient chose to have [facility] [id number] Non-Invasive Prenatal Screen [id number] PLUS w/ SCA, WITH fetal sex.  Patient choose to be billed through insurance.     Patient given brochure and is aware [facility] will contact patient's insurance and coordinate coverage.  Provided [facility] contact information. General inquiries [phone], Cost estimates [phone] and [facility] Billing [phone]. Website [email or web address].     Blood collection tubes labeled with patient identifiers (name, medical record number, and date of birth).     Filled out [facility] order form. Patient chose to have blood drawn in our office at time of visit. NIPS was drawn from right arm with a butterfly needle by MOE Perla. .      If patient chose to have blood work drawn at a [facility] lab we requested patient notify MFM (via phone call or Penxy message) when blood collected so office can follow up on results.       Maternal Fetal Medicine will have results in approximately 5-7 business days and will call patient or notify via Movayat.  Patient aware viewing lab result online will reveal fetal sex if ordered.    Patient verbalized understanding of all instructions and no questions at  this time.

## 2024-10-24 NOTE — PROGRESS NOTES
Patient chose to have [facility] [id number] Non-Invasive Prenatal Screen [id number] PLUS w/ SCA, WITH fetal sex.  Patient choose to be billed through insurance.     Patient given brochure and is aware [facility] will contact patient's insurance and coordinate coverage.  Provided [facility] contact information. General inquiries [phone], Cost estimates [phone] and [facility] Billing [phone]. Website [email or web address]     Blood collection tubes labeled with patient identifiers (name, medical record number, and date of birth).     Filled out [facility] order form. Patient chose to have blood drawn in our office at time of visit. NIPS was drawn from right arm with a butterfly needle by MOE Perla. .      If patient chose to have blood work drawn at a [facility] lab we requested patient notify MFM (via phone call or Cap That message) when blood collected so office can follow up on results.       Maternal Fetal Medicine will have results in approximately 5-7 business days and will call patient or notify via Cap That.  Patient aware viewing lab result online will reveal fetal sex if ordered.    Patient verbalized understanding of all instructions and no questions at this time.

## 2024-10-24 NOTE — PROGRESS NOTES
OFFICE CONSULT      Dear Dr. Prather,       Thank you for requesting a  consultation on your patient My Flannery for the following indications:  Genetic screening    History  Medications: Prenatal vitamins, ferrous sulfate, Claritin and albuterol  Allergies to medications: No known drug allergies  Past medical history: Asthma, depression (not currently on medication) and anemia  Past surgical history: Denies  Past obstetrical history: .  In 2021 she had a term vaginal delivery of a female  weighing 7 pounds 3 ounces. She denies pregnancy complications.  She has a history of 2 elective terminations and one spontaneous miscarriage that did not require surgical intervention.  Social history: Denies current use of alcohol, tobacco or drugs of abuse  First generation family history: Noncontributory.    Ultrasound findings:  The ultrasound shows a fetus concordant with dates. The nasal bone and nuchal translucency appear normal. No malformations are seen on today's early ultrasound.       She does not report any vaginal bleeding or uterine cramping or contractions.      Specific counseling was provided on the following problems:  1. We discussed the options for genetic screening which include invasive testing on the fetal placenta or on fetal skin cells within the amniotic fluid and compared this to noninvasive testing which includes cell free DNA screening and the sequential screen.  We reviewed the risks, the benefits and the limitations of each.  In the end patient chose to complete the cell free DNA screen.    2.  We reviewed her history of intermittent asthma. Asthma complicates 4-8% of pregnancies. The majority of women with asthma experience stability or improvement in symptoms in pregnancy, while approximately 1/3 experience worsening. Need for a rescue inhaler more than twice weekly indicates suboptimal asthma control and need for escalation in therapy.  The majority of  asthma medications are safe for pregnancy, and disease control is important for maternal and fetal health in pregnancy. If her symptoms remain well-controlled in pregnancy, no additional obstetric surveillance is indicated. However, if control is poor, there is a risk of fetal growth restriction, and evaluation of fetal growth in the second half of pregnancy is likely warranted. Prevention of respiratory morbidity is particularly important in pregnancy. Annual influenza vaccination is  recommended, as is pneumococcal vaccination if not performed previously.    3.  We reviewed current recommendations regarding  Flu, COVID and RSV (third trimester) vaccines by the American College of Obstetricians and Gynecologists and the Society for Maternal-Fetal Medicine. We discussed reassuring pregnancy outcome information after vaccination. We discussed the increased severity of infections and the resultant maternal and fetal complications that can arise with a severe infection including  labor.  Vaccines have been found to generate  antibodies in pregnant and lactating women similar to that observed in non-pregnant women. Vaccine-induced antibody levels were significantly greater than the levels found in response to natural infection. Immune transfer of these antibodies to neonates is found to occur via the placenta and breast milk.        Future tests recommended:  The results of her NIPT will return in 7-10 days and her OB office will order an MSAFP screen at 16-18 weeks to screen for spina bifida.       Future ultrasounds ordered today:   Fetal Level II ultrasound imaging is recommended at 19-20 weeks' gestation.      Split-shared decision-making between ANGIE Monet and myself was utilized, with the majority of the time spent by JAZMINE Monet.  Medical decision-making for this encounter was moderate (diagnosis moderate, data moderate and risk moderate).    I reviewed the ultrasound pictures and recommended the  medical decision making transcribed in the care of this patient.      Tali Garcia M.D.

## 2024-10-28 LAB
CFDNA.FET/CFDNA.TOTAL SFR FETUS: NORMAL %
CITATION REF LAB TEST: NORMAL
FET 13+18+21+X+Y ANEUP PLAS.CFDNA: NEGATIVE
FET CHR 21 TS PLAS.CFDNA QL: NEGATIVE
FET CHR 21 TS PLAS.CFDNA QL: NEGATIVE
FET MS X RISK WBC.DNA+CFDNA QL: NOT DETECTED
FET SEX PLAS.CFDNA DOSAGE CFDNA: NORMAL
FET TS 13 RISK PLAS.CFDNA QL: NEGATIVE
FET X + Y ANEUP RISK PLAS.CFDNA SEQ-IMP: NOT DETECTED
GA EST FROM CONCEPTION DATE: NORMAL D
GESTATIONAL AGE > 9:: YES
LAB DIRECTOR NAME PROVIDER: NORMAL
LAB DIRECTOR NAME PROVIDER: NORMAL
LABORATORY COMMENT REPORT: NORMAL
LIMITATIONS OF THE TEST: NORMAL
NEGATIVE PREDICTIVE VALUE: NORMAL
PERFORMANCE CHARACTERISTICS: NORMAL
POSITIVE PREDICTIVE VALUE: NORMAL
REF LAB TEST METHOD: NORMAL
SL AMB NOTE:: NORMAL
TEST PERFORMANCE INFO SPEC: NORMAL

## 2024-10-30 ENCOUNTER — TELEPHONE (OUTPATIENT)
Age: 24
End: 2024-10-30

## 2024-10-30 NOTE — TELEPHONE ENCOUNTER
----- Message from Tali Garcia MD sent at 10/30/2024 12:14 PM EDT -----  Ms. My Flannery   Your Cell free DNA screening returned as normal.  If you are interested in knowing what the baby's sex is, than you will need to open the lab result to see it.     Your obstetrician at your next OB visit should offer screening for spina bifida that can be completed between 16 and 18 weeks and utilizes the blood test called MSAFP. This lab is to see if your baby is at increased risk to have spinal defect.    Tali Garcia MD

## 2024-11-02 ENCOUNTER — APPOINTMENT (OUTPATIENT)
Dept: LAB | Facility: CLINIC | Age: 24
End: 2024-11-02
Payer: COMMERCIAL

## 2024-11-02 DIAGNOSIS — Z34.81 PRENATAL CARE, SUBSEQUENT PREGNANCY, FIRST TRIMESTER: ICD-10-CM

## 2024-11-02 LAB
ABO GROUP BLD: NORMAL
BACTERIA UR QL AUTO: ABNORMAL /HPF
BASOPHILS # BLD AUTO: 0.04 THOUSANDS/ΜL (ref 0–0.1)
BASOPHILS NFR BLD AUTO: 1 % (ref 0–1)
BILIRUB UR QL STRIP: NEGATIVE
BLD GP AB SCN SERPL QL: NEGATIVE
CLARITY UR: CLEAR
COLOR UR: YELLOW
EOSINOPHIL # BLD AUTO: 0.38 THOUSAND/ΜL (ref 0–0.61)
EOSINOPHIL NFR BLD AUTO: 5 % (ref 0–6)
ERYTHROCYTE [DISTWIDTH] IN BLOOD BY AUTOMATED COUNT: 13.4 % (ref 11.6–15.1)
GLUCOSE UR STRIP-MCNC: NEGATIVE MG/DL
HCT VFR BLD AUTO: 34.3 % (ref 34.8–46.1)
HGB BLD-MCNC: 11.5 G/DL (ref 11.5–15.4)
HGB UR QL STRIP.AUTO: NEGATIVE
IMM GRANULOCYTES # BLD AUTO: 0.02 THOUSAND/UL (ref 0–0.2)
IMM GRANULOCYTES NFR BLD AUTO: 0 % (ref 0–2)
KETONES UR STRIP-MCNC: NEGATIVE MG/DL
LEUKOCYTE ESTERASE UR QL STRIP: NEGATIVE
LYMPHOCYTES # BLD AUTO: 1.94 THOUSANDS/ΜL (ref 0.6–4.47)
LYMPHOCYTES NFR BLD AUTO: 27 % (ref 14–44)
MCH RBC QN AUTO: 30.7 PG (ref 26.8–34.3)
MCHC RBC AUTO-ENTMCNC: 33.5 G/DL (ref 31.4–37.4)
MCV RBC AUTO: 92 FL (ref 82–98)
MONOCYTES # BLD AUTO: 0.48 THOUSAND/ΜL (ref 0.17–1.22)
MONOCYTES NFR BLD AUTO: 7 % (ref 4–12)
MUCOUS THREADS UR QL AUTO: ABNORMAL
NEUTROPHILS # BLD AUTO: 4.35 THOUSANDS/ΜL (ref 1.85–7.62)
NEUTS SEG NFR BLD AUTO: 60 % (ref 43–75)
NITRITE UR QL STRIP: NEGATIVE
NON-SQ EPI CELLS URNS QL MICRO: ABNORMAL /HPF
NRBC BLD AUTO-RTO: 0 /100 WBCS
PH UR STRIP.AUTO: 6 [PH]
PLATELET # BLD AUTO: 196 THOUSANDS/UL (ref 149–390)
PMV BLD AUTO: 12.3 FL (ref 8.9–12.7)
PROT UR STRIP-MCNC: ABNORMAL MG/DL
RBC # BLD AUTO: 3.75 MILLION/UL (ref 3.81–5.12)
RBC #/AREA URNS AUTO: ABNORMAL /HPF
RH BLD: POSITIVE
RUBV IGG SERPL IA-ACNC: 20.4 IU/ML
SP GR UR STRIP.AUTO: 1.02 (ref 1–1.03)
UROBILINOGEN UR STRIP-ACNC: <2 MG/DL
WBC # BLD AUTO: 7.21 THOUSAND/UL (ref 4.31–10.16)
WBC #/AREA URNS AUTO: ABNORMAL /HPF

## 2024-11-02 PROCEDURE — 87086 URINE CULTURE/COLONY COUNT: CPT

## 2024-11-02 PROCEDURE — 36415 COLL VENOUS BLD VENIPUNCTURE: CPT

## 2024-11-02 PROCEDURE — 86900 BLOOD TYPING SEROLOGIC ABO: CPT

## 2024-11-02 PROCEDURE — 86803 HEPATITIS C AB TEST: CPT

## 2024-11-02 PROCEDURE — 86901 BLOOD TYPING SEROLOGIC RH(D): CPT

## 2024-11-02 PROCEDURE — 86762 RUBELLA ANTIBODY: CPT

## 2024-11-02 PROCEDURE — 81001 URINALYSIS AUTO W/SCOPE: CPT

## 2024-11-02 PROCEDURE — 87340 HEPATITIS B SURFACE AG IA: CPT

## 2024-11-02 PROCEDURE — 86850 RBC ANTIBODY SCREEN: CPT

## 2024-11-02 PROCEDURE — 86780 TREPONEMA PALLIDUM: CPT

## 2024-11-02 PROCEDURE — 85025 COMPLETE CBC W/AUTO DIFF WBC: CPT

## 2024-11-02 PROCEDURE — 87389 HIV-1 AG W/HIV-1&-2 AB AG IA: CPT

## 2024-11-03 LAB
BACTERIA UR CULT: ABNORMAL
HBV SURFACE AG SER QL: NORMAL
HCV AB SER QL: NORMAL
HIV 1+2 AB+HIV1 P24 AG SERPL QL IA: NORMAL
HIV 2 AB SERPL QL IA: NORMAL
HIV1 AB SERPL QL IA: NORMAL
HIV1 P24 AG SERPL QL IA: NORMAL
TREPONEMA PALLIDUM IGG+IGM AB [PRESENCE] IN SERUM OR PLASMA BY IMMUNOASSAY: NORMAL

## 2024-11-04 ENCOUNTER — ROUTINE PRENATAL (OUTPATIENT)
Dept: OBGYN CLINIC | Facility: CLINIC | Age: 24
End: 2024-11-04
Payer: COMMERCIAL

## 2024-11-04 VITALS — DIASTOLIC BLOOD PRESSURE: 58 MMHG | BODY MASS INDEX: 27.33 KG/M2 | WEIGHT: 149.4 LBS | SYSTOLIC BLOOD PRESSURE: 102 MMHG

## 2024-11-04 DIAGNOSIS — Z34.81 PRENATAL CARE, SUBSEQUENT PREGNANCY, FIRST TRIMESTER: Primary | ICD-10-CM

## 2024-11-04 DIAGNOSIS — Z3A.13 13 WEEKS GESTATION OF PREGNANCY: ICD-10-CM

## 2024-11-04 PROBLEM — O26.899 CRAMPING COMPLICATING PREGNANCY, ANTEPARTUM: Status: RESOLVED | Noted: 2024-10-17 | Resolved: 2024-11-04

## 2024-11-04 PROBLEM — R10.9 CRAMPING COMPLICATING PREGNANCY, ANTEPARTUM: Status: RESOLVED | Noted: 2024-10-17 | Resolved: 2024-11-04

## 2024-11-04 LAB
SL AMB  POCT GLUCOSE, UA: NORMAL
SL AMB POCT URINE PROTEIN: NORMAL

## 2024-11-04 PROCEDURE — 99213 OFFICE O/P EST LOW 20 MIN: CPT | Performed by: OBSTETRICS & GYNECOLOGY

## 2024-11-04 PROCEDURE — 81002 URINALYSIS NONAUTO W/O SCOPE: CPT | Performed by: OBSTETRICS & GYNECOLOGY

## 2024-11-04 NOTE — ASSESSMENT & PLAN NOTE
PNC appts scheduled. NIPT wnl, AFP to be ordered  20w scheduled  Nutrition: continue PNV, healthy diet, exercise recommend 25-30lb weight gain.  TWlb    Labs: Pap ASCUS HPV neg  Hgb 11.5.. h/o anemia, recommend PNV and oral iron.

## 2024-11-04 NOTE — PROGRESS NOTES
13 weeks gestation of pregnancy  PNC appts scheduled. NIPT wnl, AFP to be ordered  20w scheduled  Nutrition: continue PNV, healthy diet, exercise recommend 25-30lb weight gain.  TWlb    Labs: Pap ASCUS HPV neg  Hgb 11.5.. h/o anemia, recommend PNV and oral iron.       Patient is here for prenatal ob visit today.  GA: 13w5d  PEPE: 25    Denies LOF, VB, CTX  Has not began to feel FM yet    Urine: Protein trace / Glucose neg  Labs utd  Pap collected 10/17/24  Gc/chl collected 10/17/24    Blue folder given at OB intake  Flu deferred    Tired and nausea.

## 2024-11-07 NOTE — RESULT ENCOUNTER NOTE
Ms. My Pritchardoa   Your Cell free DNA screening returned as normal.  If you are interested in knowing what the baby's sex is, than you will need to open the lab result to see it.     Your obstetrician at your next OB visit should offer screening for spina bifida that can be completed between 16 and 18 weeks and utilizes the blood test called MSAFP. This lab is to see if your baby is at increased risk to have spinal defect.    Tali Garcia MD   [FreeTextEntry3] : TIMOTHY Carlton has acted like a scribe on my behalf. I have reviewed the note and edited where appropriate. History, PE, assessment, and plan were personally performed by me.

## 2024-11-12 ENCOUNTER — NURSE TRIAGE (OUTPATIENT)
Age: 24
End: 2024-11-12

## 2024-11-12 ENCOUNTER — TELEMEDICINE (OUTPATIENT)
Age: 24
End: 2024-11-12
Payer: COMMERCIAL

## 2024-11-12 DIAGNOSIS — J06.9 VIRAL URI WITH COUGH: Primary | ICD-10-CM

## 2024-11-12 PROCEDURE — 99213 OFFICE O/P EST LOW 20 MIN: CPT | Performed by: FAMILY MEDICINE

## 2024-11-12 RX ORDER — IPRATROPIUM BROMIDE 21 UG/1
2 SPRAY, METERED NASAL EVERY 12 HOURS
Qty: 30 ML | Refills: 1 | Status: SHIPPED | OUTPATIENT
Start: 2024-11-12

## 2024-11-12 NOTE — PROGRESS NOTES
Virtual Regular Visit  Name: My Flannery      : 2000      MRN: [id number]  Encounter Provider: Sage Carreno MD  Encounter Date: 2024   Encounter department: [facility]    Verification of patient location:    Patient is located at Home in the following state in which I hold an active license [place]    Assessment & Plan  Viral URI with cough  Uri vs allergies vs exacerbation of asthma.   Pregnant currently  Discussed OTC management including Vicks, humidifier  Provided Atrovent nasal spray 2 times a day as needed.  Consider switching from Claritin to a different allergy medication  If no improvement or worsening would recommend a same-day office visit  Orders:    ipratropium (ATROVENT) 0.03 % nasal spray; 2 sprays into each nostril every 12 (twelve) hours         Encounter provider Sage Carreno MD    The patient was identified by name and date of birth. My Flannery was informed that this is a telemedicine visit and that the visit is being conducted through the Epic Embedded platform. She agrees to proceed..  My office door was closed. No one else was in the room.  She acknowledged consent and understanding of privacy and security of the video platform. The patient has agreed to participate and understands they can discontinue the visit at any time.    Patient is aware this is a billable service.     History of Present Illness     HPI    History obtained from : patient  Review of Systems   HENT:  Positive for congestion.    Respiratory:  Positive for cough.    Neurological:  Positive for headaches.     Pertinent Medical History         Objective     LMP 2024 (Exact Date)   Physical Exam  Constitutional:       Appearance: Normal appearance.   HENT:      Nose: Congestion present.   Pulmonary:      Effort: Pulmonary effort is normal. No respiratory distress.   Neurological:      Mental Status: She is alert.         Visit Time  Total Visit Duration: 20  minutes

## 2024-11-12 NOTE — TELEPHONE ENCOUNTER
"14w6d OB patient called in reporting leaking clear fluid from both her breasts. Not upon squeezing, just noted wetness on her shirt on both sides. Denies redness, warmth, swelling, fevers, discoloration, or pain. No fetal concerns.  Not breastfeeding. Please advise on recommendations.     Reason for Disposition   Nipple discharge and not bloody (e.g., clear, white, yellow, brown, green)    Answer Assessment - Initial Assessment Questions  1. SYMPTOM: \"What's the main symptom you're concerned about?\"  (e.g., lump, nipple discharge, pain, rash )      Clear discharge  2. LOCATION: \"Where is the discharge located?\"      BL nipple drainage  3. ONSET: \"When did Sx  start?\"      today  4. PRIOR HISTORY: \"Do you have any history of prior problems with your breasts?\" (e.g., breast cancer, breast implant, fibrocystic breast disease)      Denies  5. CAUSE: \"What do you think is causing this symptom?\"      Unsure  6. OTHER SYMPTOMS: \"Do you have any other symptoms?\" (e.g., fever, breast pain, nipple discharge, redness or rash)      Denies  7. PREGNANCY-BREASTFEEDING: \"Is there any chance you are pregnant?\" \"When was your last menstrual period?\" \"Are you breastfeeding?\"      14w6d Not breast feeding    Protocols used: Breast Symptoms-Adult-[place]    "

## 2024-11-20 ENCOUNTER — TELEPHONE (OUTPATIENT)
Dept: OBGYN CLINIC | Facility: CLINIC | Age: 24
End: 2024-11-20

## 2024-11-20 NOTE — TELEPHONE ENCOUNTER
Patient was called for 2nd Trimester follow up. Left V.M for Patient to reach out via portal for any general questions or call office for any concerns.

## 2024-11-25 ENCOUNTER — TELEPHONE (OUTPATIENT)
Age: 24
End: 2024-11-25

## 2024-11-25 DIAGNOSIS — J45.20 MILD INTERMITTENT ASTHMA WITHOUT COMPLICATION: ICD-10-CM

## 2024-11-26 ENCOUNTER — TELEPHONE (OUTPATIENT)
Age: 24
End: 2024-11-26

## 2024-11-26 RX ORDER — ALBUTEROL SULFATE 90 UG/1
2 INHALANT RESPIRATORY (INHALATION) EVERY 4 HOURS PRN
Qty: 6.7 G | Refills: 4 | Status: SHIPPED | OUTPATIENT
Start: 2024-11-26

## 2024-11-26 NOTE — TELEPHONE ENCOUNTER
Patient would like to reschedule 11/27 prenatal visit. She is open to any location, offered 12/2 in [facility]. She is looking for an appointment on either 11/29 or 12/4. Contact patient to assist with scheduling.

## 2024-11-27 ENCOUNTER — TELEPHONE (OUTPATIENT)
Dept: OBGYN CLINIC | Facility: CLINIC | Age: 24
End: 2024-11-27

## 2024-11-27 NOTE — TELEPHONE ENCOUNTER
Patient was called for 2nd Trimester follow up. Left V.M to call back office with any questions or concerns.

## 2024-11-29 ENCOUNTER — APPOINTMENT (OUTPATIENT)
Dept: LAB | Facility: MEDICAL CENTER | Age: 24
End: 2024-11-29
Payer: COMMERCIAL

## 2024-11-29 ENCOUNTER — ROUTINE PRENATAL (OUTPATIENT)
Dept: OBGYN CLINIC | Facility: MEDICAL CENTER | Age: 24
End: 2024-11-29
Payer: COMMERCIAL

## 2024-11-29 VITALS
DIASTOLIC BLOOD PRESSURE: 60 MMHG | WEIGHT: 151 LBS | HEART RATE: 110 BPM | BODY MASS INDEX: 27.79 KG/M2 | HEIGHT: 62 IN | SYSTOLIC BLOOD PRESSURE: 104 MMHG

## 2024-11-29 DIAGNOSIS — Z34.82 PRENATAL CARE, SUBSEQUENT PREGNANCY, SECOND TRIMESTER: Primary | ICD-10-CM

## 2024-11-29 DIAGNOSIS — J45.909 ASTHMA AFFECTING PREGNANCY IN SECOND TRIMESTER: ICD-10-CM

## 2024-11-29 DIAGNOSIS — Z34.81 PRENATAL CARE, SUBSEQUENT PREGNANCY, FIRST TRIMESTER: ICD-10-CM

## 2024-11-29 DIAGNOSIS — Z3A.17 17 WEEKS GESTATION OF PREGNANCY: ICD-10-CM

## 2024-11-29 DIAGNOSIS — O99.512 ASTHMA AFFECTING PREGNANCY IN SECOND TRIMESTER: ICD-10-CM

## 2024-11-29 LAB
SL AMB  POCT GLUCOSE, UA: NORMAL
SL AMB POCT URINE PROTEIN: NORMAL

## 2024-11-29 PROCEDURE — 99214 OFFICE O/P EST MOD 30 MIN: CPT | Performed by: CLINICAL NURSE SPECIALIST

## 2024-11-29 PROCEDURE — 82105 ALPHA-FETOPROTEIN SERUM: CPT

## 2024-11-29 PROCEDURE — 36415 COLL VENOUS BLD VENIPUNCTURE: CPT

## 2024-11-29 PROCEDURE — 81002 URINALYSIS NONAUTO W/O SCOPE: CPT | Performed by: CLINICAL NURSE SPECIALIST

## 2024-11-29 RX ORDER — MONTELUKAST SODIUM 10 MG/1
10 TABLET ORAL
Qty: 30 TABLET | Refills: 6 | Status: SHIPPED | OUTPATIENT
Start: 2024-11-29

## 2024-11-29 RX ORDER — FLUTICASONE PROPIONATE 50 MCG
1 SPRAY, SUSPENSION (ML) NASAL DAILY
Qty: 9.9 ML | Refills: 2 | Status: SHIPPED | OUTPATIENT
Start: 2024-11-29

## 2024-11-29 NOTE — PROGRESS NOTES
"Prenatal Visit  Name: My Flannery      : 2000      MRN: [id number]  Encounter Provider: ANGIE Metzger  Encounter Date: 2024   Encounter department: [facility]    :  Assessment & Plan  Prenatal care, subsequent pregnancy, second trimester     Orders:    POCT urine dip    17 weeks gestation of pregnancy  , 17w2d  Doing well overall  Genetic screening NIPT low risk.   Has order for AFP- reminded to complete before level 2 US   We reviewed the common symptoms of this stage of pregnancy as well as warning signs/symptoms, including spotting/bleeding, severe pelvic pain, or menstrual like cramping.       Asthma affecting pregnancy in second trimester  Patient's asthma is not controlled under current regimen  Patient is using her rescue inhaler multiple times a day and has additional allergy symptoms such as watery eyes   Prescribed Singulair and Flonase for asthma and allergy symptoms   Recommended taking Zyrtec or Claritin for symptoms as well. F/U with PCP if no improvement with these changes.   Orders:    montelukast (SINGULAIR) 10 mg tablet; Take 1 tablet (10 mg total) by mouth daily at bedtime    fluticasone (FLONASE) 50 mcg/act nasal spray; 1 spray into each nostril daily             History of Present Illness     My is a 23 y.o.  17w2d here for Routine Prenatal Visit (PEPE 25/Blood type A+/Labs UTD/Flu declined /Denies LOF, VB, or CTX./Pt has +FM/Questions or concerns-  )      She denies any cramping, LOF/unusual discharge or VB. Not feeling flutters of fetal activity yet.   Is c/o allergy symptoms- runny nose, watery eyes  Has asthma and using PRN inhaler daily, sometimes multiple times    Review of Systems  Objective   /60 (BP Location: Left arm, Patient Position: Sitting, Cuff Size: Standard)   Pulse (!) 110   Ht 5' 2\" (1.575 m)   Wt 68.5 kg (151 lb)   LMP 2024 (Exact Date)   BMI 27.62 kg/m²     Pregravid " Weight/BMI: 67.1 kg (148 lb) (BMI 27.06)  Total Weight Gain: 1.361 kg (3 lb)     OBGyn Exam   Fetal Heart Rate: 150

## 2024-11-29 NOTE — ASSESSMENT & PLAN NOTE
Patient's asthma is not controlled under current regimen  Patient is using her rescue inhaler multiple times a day and has additional allergy symptoms such as watery eyes   Prescribed Singulair and Flonase for asthma and allergy symptoms   Recommended taking Zyrtec or Claritin for symptoms as well. F/U with PCP if no improvement with these changes.   Orders:    montelukast (SINGULAIR) 10 mg tablet; Take 1 tablet (10 mg total) by mouth daily at bedtime    fluticasone (FLONASE) 50 mcg/act nasal spray; 1 spray into each nostril daily

## 2024-11-29 NOTE — ASSESSMENT & PLAN NOTE
, 17w2d  Doing well overall  Genetic screening NIPT low risk.   Has order for AFP- reminded to complete before level 2 US   We reviewed the common symptoms of this stage of pregnancy as well as warning signs/symptoms, including spotting/bleeding, severe pelvic pain, or menstrual like cramping.

## 2024-12-04 ENCOUNTER — RESULTS FOLLOW-UP (OUTPATIENT)
Dept: OBGYN CLINIC | Facility: CLINIC | Age: 24
End: 2024-12-04

## 2024-12-04 LAB
2ND TRIMESTER 4 SCREEN SERPL-IMP: NORMAL
AFP ADJ MOM SERPL: 1.33
AFP INTERP AMN-IMP: NORMAL
AFP INTERP SERPL-IMP: NORMAL
AFP INTERP SERPL-IMP: NORMAL
AFP SERPL-MCNC: 57.7 NG/ML
AGE AT DELIVERY: 23.4 YR
GA METHOD: NORMAL
GA: 17.3 WEEKS
IDDM PATIENT QL: NO
MULTIPLE PREGNANCY: NO
NEURAL TUBE DEFECT RISK FETUS: 8798 %

## 2024-12-23 PROBLEM — Z3A.20 20 WEEKS GESTATION OF PREGNANCY: Status: ACTIVE | Noted: 2024-10-01

## 2024-12-24 ENCOUNTER — TELEPHONE (OUTPATIENT)
Age: 24
End: 2024-12-24

## 2024-12-30 ENCOUNTER — ROUTINE PRENATAL (OUTPATIENT)
Dept: PERINATAL CARE | Facility: OTHER | Age: 24
End: 2024-12-30
Payer: COMMERCIAL

## 2024-12-30 VITALS
HEIGHT: 62 IN | SYSTOLIC BLOOD PRESSURE: 110 MMHG | WEIGHT: 154.6 LBS | DIASTOLIC BLOOD PRESSURE: 58 MMHG | HEART RATE: 82 BPM | BODY MASS INDEX: 28.45 KG/M2

## 2024-12-30 DIAGNOSIS — Z36.86 ENCOUNTER FOR ANTENATAL SCREENING FOR CERVICAL LENGTH: ICD-10-CM

## 2024-12-30 DIAGNOSIS — J45.909 ASTHMA AFFECTING PREGNANCY IN SECOND TRIMESTER: ICD-10-CM

## 2024-12-30 DIAGNOSIS — Z36.89 ENCOUNTER FOR FETAL ANATOMIC SURVEY: ICD-10-CM

## 2024-12-30 DIAGNOSIS — O99.512 ASTHMA AFFECTING PREGNANCY IN SECOND TRIMESTER: ICD-10-CM

## 2024-12-30 DIAGNOSIS — O26.872 SHORT CERVIX DURING PREGNANCY IN SECOND TRIMESTER: Primary | ICD-10-CM

## 2024-12-30 DIAGNOSIS — Z3A.21 21 WEEKS GESTATION OF PREGNANCY: ICD-10-CM

## 2024-12-30 PROBLEM — Z86.59 HISTORY OF DEPRESSION: Status: RESOLVED | Noted: 2021-06-17 | Resolved: 2024-12-30

## 2024-12-30 PROCEDURE — 76817 TRANSVAGINAL US OBSTETRIC: CPT | Performed by: OBSTETRICS & GYNECOLOGY

## 2024-12-30 PROCEDURE — 99214 OFFICE O/P EST MOD 30 MIN: CPT | Performed by: OBSTETRICS & GYNECOLOGY

## 2024-12-30 PROCEDURE — 76811 OB US DETAILED SNGL FETUS: CPT | Performed by: OBSTETRICS & GYNECOLOGY

## 2024-12-30 RX ORDER — PROGESTERONE 200 MG/1
1 CAPSULE ORAL
Qty: 90 CAPSULE | Refills: 1 | Status: SHIPPED | OUTPATIENT
Start: 2024-12-30

## 2024-12-30 NOTE — PROGRESS NOTES
Ultrasound Probe Disinfection    A transvaginal ultrasound was performed.   Prior to use, disinfection was performed with High Level Disinfection Process ([employer]).  Probe serial number M2: [id number] was used.    Miya Lombardo  12/30/24  2:37 PM

## 2024-12-30 NOTE — PROGRESS NOTES
My Flannery  has no complaints today at 21w5d. She reports fetal movements and does not report any vaginal bleeding or signs of labor.  Her recently completed fetal testing revealed a normal NIPT and MSAFP. She is here today for an ultrasound for fetal anatomy.    Problem list:  Asthma    Ultrasound findings:  The ultrasound today shows normal interval fetal growth and fluid, and no malformations were detected.  Cervical length today is measuring at 2.24 cm which is shorter then normal.       Pregnancy ultrasound has limitations and is unable to detect all forms of fetal congenital abnormalities.      Specific counseling was provided on the following problems:  Discussed the findings today with her and her mother about the shortened cervix.  She had a prior term vaginal delivery without complications.  Her cervical length in that pregnancy was 3.4 cm at 20 weeks.  This finding can be associated with increased risk for  labor in this pregnancy.  Recommend starting vaginal progesterone 200 mg nightly till 36 weeks and 6 days.  Recommend 1 further transvaginal scan in 1 week.  If her cervix continues to shorten less than 1 cm she may be a candidate for a cerclage.   labor precautions discussed including when to contact her OB office.     Follow up recommended:   Recommend a transvaginal scan in 1 week and a growth scan at 32 weeks.    Pre visit time reviewing her records   5 minutes  Face to face time 10 minutes  Post visit time on documentation of note, updating her problem list, adding orders and prescriptions 10 minutes.  Procedures that were completed today were charged separately.   The level of decision making was low level complexity.    Tali Garcia MD

## 2024-12-30 NOTE — LETTER
2024     ANGIE Walker  [address] [facility] [address]    Patient: My Flannery   YOB: 2000   Date of Visit: 2024       Dear Dr. Quiroga:    Thank you for referring My Flannery to me for evaluation. Below are my notes for this consultation.    If you have questions, please do not hesitate to call me. I look forward to following your patient along with you.         Sincerely,        Tali Garcia MD        CC: No Recipients    Tali Garcia MD  2024  4:33 PM  Sign when Signing Visit  My Flannery  has no complaints today at 21w5d. She reports fetal movements and does not report any vaginal bleeding or signs of labor.  Her recently completed fetal testing revealed a normal NIPT and MSAFP. She is here today for an ultrasound for fetal anatomy.    Problem list:  Asthma    Ultrasound findings:  The ultrasound today shows normal interval fetal growth and fluid, and no malformations were detected.  Cervical length today is measuring at 2.24 cm which is shorter then normal.       Pregnancy ultrasound has limitations and is unable to detect all forms of fetal congenital abnormalities.      Specific counseling was provided on the following problems:  Discussed the findings today with her and her mother about the shortened cervix.  She had a prior term vaginal delivery without complications.  Her cervical length in that pregnancy was 3.4 cm at 20 weeks.  This finding can be associated with increased risk for  labor in this pregnancy.  Recommend starting vaginal progesterone 200 mg nightly till 36 weeks and 6 days.  Recommend 1 further transvaginal scan in 1 week.  If her cervix continues to shorten less than 1 cm she may be a candidate for a cerclage.   labor precautions discussed including when to contact her OB office.     Follow up recommended:   Recommend a transvaginal scan in 1 week and a growth scan at 32  weeks.    Pre visit time reviewing her records   5 minutes  Face to face time 10 minutes  Post visit time on documentation of note, updating her problem list, adding orders and prescriptions 10 minutes.  Procedures that were completed today were charged separately.   The level of decision making was low level complexity.    MD Miya Sparks  12/30/2024  2:37 PM  Sign when Signing Visit  Ultrasound Probe Disinfection    A transvaginal ultrasound was performed.   Prior to use, disinfection was performed with High Level Disinfection Process (Trophon).  Probe serial number M2: [id number] was used.    Miya Lombardo  12/30/24  2:37 PM

## 2024-12-31 PROBLEM — Z3A.22 22 WEEKS GESTATION OF PREGNANCY: Status: ACTIVE | Noted: 2024-12-31

## 2025-01-02 NOTE — TELEPHONE ENCOUNTER
Left message notifying Ms. Flannery that we have rescheduled her appointment to Susanna Solis's schedule for next Wednesday, January 8, 2025 @3:30pm.  Please advise.

## 2025-01-07 ENCOUNTER — ROUTINE PRENATAL (OUTPATIENT)
Dept: PERINATAL CARE | Facility: OTHER | Age: 25
End: 2025-01-07
Payer: COMMERCIAL

## 2025-01-07 VITALS
DIASTOLIC BLOOD PRESSURE: 52 MMHG | SYSTOLIC BLOOD PRESSURE: 96 MMHG | BODY MASS INDEX: 28.89 KG/M2 | HEIGHT: 62 IN | HEART RATE: 94 BPM | WEIGHT: 157 LBS

## 2025-01-07 DIAGNOSIS — O26.872 SHORT CERVIX DURING PREGNANCY IN SECOND TRIMESTER: ICD-10-CM

## 2025-01-07 DIAGNOSIS — Z3A.22 22 WEEKS GESTATION OF PREGNANCY: Primary | ICD-10-CM

## 2025-01-07 DIAGNOSIS — Z36.86 ENCOUNTER FOR ANTENATAL SCREENING FOR CERVICAL LENGTH: ICD-10-CM

## 2025-01-07 PROBLEM — Z3A.23 23 WEEKS GESTATION OF PREGNANCY: Status: RESOLVED | Noted: 2024-12-31 | Resolved: 2025-01-07

## 2025-01-07 PROBLEM — Z3A.23 23 WEEKS GESTATION OF PREGNANCY: Status: ACTIVE | Noted: 2024-12-31

## 2025-01-07 PROCEDURE — 76815 OB US LIMITED FETUS(S): CPT

## 2025-01-07 PROCEDURE — 99213 OFFICE O/P EST LOW 20 MIN: CPT

## 2025-01-07 PROCEDURE — 76817 TRANSVAGINAL US OBSTETRIC: CPT

## 2025-01-07 PROCEDURE — 76817 TRANSVAGINAL US OBSTETRIC: CPT | Performed by: OBSTETRICS & GYNECOLOGY

## 2025-01-07 PROCEDURE — 76815 OB US LIMITED FETUS(S): CPT | Performed by: OBSTETRICS & GYNECOLOGY

## 2025-01-07 NOTE — PROGRESS NOTES
"[facility]: Ms. Flannery was seen today at 22w6d gestational age for serial cervical length screening ultrasound.  See ultrasound report under \"OB Procedures\" tab.    Cynthia ADAMS  "

## 2025-01-07 NOTE — PATIENT INSTRUCTIONS
Patient Education     Pregnancy - The Fifth Month   About this topic   It is important for you to learn how to take care of yourself to help you have a healthy baby and safe delivery. It is good to have health care throughout your pregnancy.  The fifth month of your pregnancy starts around week 19 and lasts through week 23. By knowing how far along you are, you can learn what is normal for this stage of your pregnancy and plan for what is next.  General   Growth and Development   During the fifth month of your pregnancy, here are some things you can expect.  You may:  Start to gain a little more weight. It is normal to gain about 10 to 15 pounds (4.5 to 7 kg) total in your first 5 months.  Have leg cramps. Be sure to drink plenty of water.  Have loose teeth.  Have more trouble breathing or with heartburn as your baby gets bigger  Start having [place] contractions. You may feel your belly squeezing or getting tight. Be sure to drink 6 to 8 glasses of water each day.  Notice you are able to express milk from your breasts  See stretch marks on your belly, breasts, or legs. Stay active to try and keep good muscle tone.  Be checked for gestational diabetes  Your baby's growth and development:  Your baby is covered with a thick whitish substance called vernix. This helps protect your baby’s skin.  Their genitals are able to be seen on an ultrasound. Your doctor will check your baby’s size, how much fluid there is around your baby, and all of your baby’s organs with the ultrasound.  Your baby is starting to be able to see, hear, taste, and feel touch.  The bones are starting to make blood cells.  Your baby is about 11 inches (28 cm) long and weighs about 1 pound (450 gm). Your baby is about the size of a grapefruit.  Things to Think About   Avoid alcohol, drugs, tobacco products, and second hand smoke  Do not clean your cat litter box. You could get a disease that causes birth defects to your baby.  Check with your  doctor before taking any kind of drugs. Continue to take your vitamin with folic acid.  Do you plan to breastfeed your baby?  Where will you deliver? Do you plan to take prenatal classes? If so, try to have them completed by your 8th month.  Start to think about your plans for pain control during labor.  You may want to learn about cord blood banking.  Rest and take breaks each day.  When do I need to call the doctor?   Contractions every 10 minutes or more often that do not go away with drinking water or position changes  Low, dull back pain that does not go away  Pressure in your pelvis that feels like your baby is pushing down  A gush or constant trickle of watery or bloody fluid leaking from your vagina  Cramps in your lower belly that come and go or are constant  Little to no movement felt by baby in 2 hours. Your baby should move at least 10 times every 2 hours.  Headache that does not go away, blurry vision, seeing spots or halos, increase in swelling in your hands, feet, or face, and pain under your ribs on the right side  Fever of 100.4°F (38°C) or higher  Bleeding or swelling of your gums  Urinating less, or having pain when you urinate  Vaginal bleeding with or without pain  After a car accident, fall, or any trauma to your belly  Having thoughts of harming yourself or others, or do not feel safe at home  Last Reviewed Date   2020-04-20  Consumer Information Use and Disclaimer   This generalized information is a limited summary of diagnosis, treatment, and/or medication information. It is not meant to be comprehensive and should be used as a tool to help the user understand and/or assess potential diagnostic and treatment options. It does NOT include all information about conditions, treatments, medications, side effects, or risks that may apply to a specific patient. It is not intended to be medical advice or a substitute for the medical advice, diagnosis, or treatment of a health care provider based on  the health care provider's examination and assessment of a patient’s specific and unique circumstances. Patients must speak with a health care provider for complete information about their health, medical questions, and treatment options, including any risks or benefits regarding use of medications. This information does not endorse any treatments or medications as safe, effective, or approved for treating a specific patient. UpToDate, Inc. and its affiliates disclaim any warranty or liability relating to this information or the use thereof. The use of this information is governed by the Terms of Use, available at https://www.woltersGOGETMi / ?????.??uwer.com/en/know/clinical-effectiveness-terms   Copyright   Copyright © 2024 UpToDate, Inc. and its affiliates and/or licensors. All rights reserved.

## 2025-01-07 NOTE — PROGRESS NOTES
"[facility]: My Flannery was seen today at 22w6d for serial cervical length screening ultrasound.  See ultrasound report under \"OB Procedures\" tab.  Please don't hesitate to contact our office with any concerns or questions.  -Kathleen No MD    "

## 2025-01-08 ENCOUNTER — ROUTINE PRENATAL (OUTPATIENT)
Dept: OBGYN CLINIC | Facility: CLINIC | Age: 25
End: 2025-01-08
Payer: COMMERCIAL

## 2025-01-08 VITALS — DIASTOLIC BLOOD PRESSURE: 78 MMHG | WEIGHT: 157 LBS | BODY MASS INDEX: 28.72 KG/M2 | SYSTOLIC BLOOD PRESSURE: 110 MMHG

## 2025-01-08 DIAGNOSIS — Z34.82 PRENATAL CARE, SUBSEQUENT PREGNANCY, SECOND TRIMESTER: Primary | ICD-10-CM

## 2025-01-08 DIAGNOSIS — O41.8X10 SUBCHORIONIC HEMATOMA IN FIRST TRIMESTER, SINGLE OR UNSPECIFIED FETUS: ICD-10-CM

## 2025-01-08 DIAGNOSIS — O46.8X1 SUBCHORIONIC HEMATOMA IN FIRST TRIMESTER, SINGLE OR UNSPECIFIED FETUS: ICD-10-CM

## 2025-01-08 DIAGNOSIS — O26.872 SHORT CERVIX DURING PREGNANCY IN SECOND TRIMESTER: ICD-10-CM

## 2025-01-08 DIAGNOSIS — Z3A.23 23 WEEKS GESTATION OF PREGNANCY: ICD-10-CM

## 2025-01-08 LAB
SL AMB  POCT GLUCOSE, UA: NORMAL
SL AMB POCT URINE PROTEIN: NORMAL

## 2025-01-08 PROCEDURE — 99212 OFFICE O/P EST SF 10 MIN: CPT | Performed by: NURSE PRACTITIONER

## 2025-01-08 PROCEDURE — 81002 URINALYSIS NONAUTO W/O SCOPE: CPT | Performed by: NURSE PRACTITIONER

## 2025-01-08 NOTE — ASSESSMENT & PLAN NOTE
Feels well. Denies LOF/CTX/VB. Discussed fetal awareness. No concerns. Overview charting updated today.

## 2025-01-08 NOTE — PROGRESS NOTES
Problem   23 Weeks Gestation of Pregnancy    Init. PNBW-completed  NIPT-low risk  AFP- neg  Level 2 US  Progesterone QHS, Pelvic Rest until third trimester    L2 on 12/30  FG at 32 wks on 3/10  Cervical shortening noted, repeat check on 1/7 OK, pelvic rest reinforced  Flu shot declined, discussed precautions    ASCUS HPV neg 10/2024         23 weeks gestation of pregnancy  Feels well. Denies LOF/CTX/VB. Discussed fetal awareness. No concerns. Overview charting updated today.

## 2025-01-21 PROBLEM — Z3A.25 25 WEEKS GESTATION OF PREGNANCY: Status: ACTIVE | Noted: 2024-10-01

## 2025-01-22 ENCOUNTER — TELEPHONE (OUTPATIENT)
Age: 25
End: 2025-01-22

## 2025-01-22 NOTE — TELEPHONE ENCOUNTER
Patient called today to cancel her 25 week appointment for 1/22 and to reschedule it however I am not seeing anything in her timeframe. Patient would like a call back to get rescheduled. Please advise. Thank you!

## 2025-01-22 NOTE — TELEPHONE ENCOUNTER
Spoke to pt offered 1/23 at 11:30 in [facility]. Pt refused, pt needs appt before 9:30 or after 3:00, MENA schrader, MARIFER or Tripp harris.

## 2025-01-29 ENCOUNTER — ROUTINE PRENATAL (OUTPATIENT)
Dept: OBGYN CLINIC | Facility: CLINIC | Age: 25
End: 2025-01-29
Payer: COMMERCIAL

## 2025-01-29 VITALS — BODY MASS INDEX: 29.45 KG/M2 | SYSTOLIC BLOOD PRESSURE: 106 MMHG | DIASTOLIC BLOOD PRESSURE: 78 MMHG | WEIGHT: 161 LBS

## 2025-01-29 DIAGNOSIS — O26.872 SHORT CERVIX DURING PREGNANCY IN SECOND TRIMESTER: ICD-10-CM

## 2025-01-29 DIAGNOSIS — O41.8X10 SUBCHORIONIC HEMATOMA IN FIRST TRIMESTER, SINGLE OR UNSPECIFIED FETUS: ICD-10-CM

## 2025-01-29 DIAGNOSIS — Z34.82 PRENATAL CARE, SUBSEQUENT PREGNANCY, SECOND TRIMESTER: Primary | ICD-10-CM

## 2025-01-29 DIAGNOSIS — O46.8X1 SUBCHORIONIC HEMATOMA IN FIRST TRIMESTER, SINGLE OR UNSPECIFIED FETUS: ICD-10-CM

## 2025-01-29 DIAGNOSIS — Z3A.26 26 WEEKS GESTATION OF PREGNANCY: ICD-10-CM

## 2025-01-29 DIAGNOSIS — M54.9 BACK PAIN IN PREGNANCY: ICD-10-CM

## 2025-01-29 DIAGNOSIS — O99.891 BACK PAIN IN PREGNANCY: ICD-10-CM

## 2025-01-29 LAB
SL AMB  POCT GLUCOSE, UA: NORMAL
SL AMB POCT URINE PROTEIN: NORMAL

## 2025-01-29 PROCEDURE — 81002 URINALYSIS NONAUTO W/O SCOPE: CPT | Performed by: NURSE PRACTITIONER

## 2025-01-29 PROCEDURE — 99213 OFFICE O/P EST LOW 20 MIN: CPT | Performed by: NURSE PRACTITIONER

## 2025-01-29 RX ORDER — PROGESTERONE 200 MG/1
1 CAPSULE ORAL
Qty: 90 CAPSULE | Refills: 1 | Status: SHIPPED | OUTPATIENT
Start: 2025-01-29

## 2025-01-29 NOTE — PROGRESS NOTES
Problem   26 Weeks Gestation of Pregnancy    Init. PNBW-completed  NIPT-low risk  AFP- neg  Level 2 US  Progesterone QHS, Pelvic Rest until third trimester    L2 on 12/30  FG at 32 wks on 3/10  Cervical checks for shortening, repeat check on 1/7 OK, pelvic rest reinforced  Flu shot declined, discussed precautions    ASCUS HPV neg 10/2024         26 weeks gestation of pregnancy  Feels well.  She is complaining of pelvic pressure and back pain and is interested in seeing our pelvic physical therapy program.  I discussed using a maternity belt for support.  Her urine was concentrated today and I also advised increasing her fluids.  Denies LOF/CTX/VB. Discussed fetal awareness. No concerns. Overview charting updated today.

## 2025-01-29 NOTE — PATIENT INSTRUCTIONS
Patient Education     Pregnancy - The Sixth Month   About this topic   It is important for you to learn how to take care of yourself to help you have a healthy baby and safe delivery. It is good to have health care throughout your pregnancy.  The sixth month of your pregnancy starts around week 24 and lasts through week 28. By knowing how far along you are, you can learn what is normal for this stage of your pregnancy and plan for what is next.  General   Your body   During the sixth month of your pregnancy, here are some things you can expect.  You may:  Start to gain a little more weight. It is normal to gain about 10 to 15 pounds (4.5 to 7 kg) total in your first 6 months.  Have problems like back pain, dry eyes, or aching hands and wrists  Itching of palms, abdomen, or soles of your feet  Swelling of ankles, fingers, or face.  Need to urinate more frequently.  Have problems with hemorrhoids. Be sure to eat plenty of fresh fruits and vegetables to increase the fiber in your diet. Drink plenty of water to help keep your stools soft.  Continue having [place] contractions. You may feel your belly squeezing or getting tight.  Have a glucose test to test for gestational diabetes.  Have more headaches. Talk to your doctor about how to help with them.  See stretch marks on your belly, breasts, or legs. Stay active to try and keep good muscle tone.  See an increase in vaginal discharge. Talk to your doctor about what to expect.  Your baby's growth and development:  Your baby looks like a very small  baby.  They are able to live outside of your womb but would need a lot of help breathing, eating, and staying warm in an intensive care unit.  Your baby has times of being awake and times of being asleep. The baby’s eyelids are able to open and close.  They move more and have hiccups.  Your baby is about 14 inches (36 cm) long and weighs about 2 pounds (900 gm). Your baby is about the size of an eggplant.  Baby  may be able to hear voices.  Things to Think About   Avoid alcohol, drugs, tobacco products, and second hand smoke  Eat small meals throughout the day instead of larger meals.  Avoid spicy, greasy, or fatty foods.  Avoid lying down or bending over for around 3 hours after eating  Warm baths are fine to help you relax. Avoid hot tubs while you are pregnant.  Avoid straining when having bowel movements.  Learning how to care for your baby. You may want to sign up for classes on how to take care of a .  Are you planning on going back to work after having your baby? It’s not too early to think about .  Consider starting your birth plan if you have specific needs or wishes for delivery.  When do I need to call the doctor?   Contractions every 10 minutes or more often that do not go away with drinking water or position changes  Low, dull back pain that does not go away  Pressure in your pelvis that feels like your baby is pushing down  A gush or constant trickle of watery or bloody fluid leaking from your vagina  Cramps in your lower belly that come and go or are constant  Change in your baby's movement  Fever of 100.4°F (38°C) or higher  Little to no movement felt by baby in 2 hours. Your baby should be moving at least 10 times every 2 hours.  Headache that does not go away; blurry vision; seeing spots or halos; increase in swelling in your hands, feet, or face; and pain under your ribs on the right side  Vaginal bleeding with or without pain  After a car accident, fall, or any trauma to your belly  Having thoughts of harming yourself or others, or do not feel safe at home  Last Reviewed Date   2020  Consumer Information Use and Disclaimer   This generalized information is a limited summary of diagnosis, treatment, and/or medication information. It is not meant to be comprehensive and should be used as a tool to help the user understand and/or assess potential diagnostic and treatment options. It does  NOT include all information about conditions, treatments, medications, side effects, or risks that may apply to a specific patient. It is not intended to be medical advice or a substitute for the medical advice, diagnosis, or treatment of a health care provider based on the health care provider's examination and assessment of a patient’s specific and unique circumstances. Patients must speak with a health care provider for complete information about their health, medical questions, and treatment options, including any risks or benefits regarding use of medications. This information does not endorse any treatments or medications as safe, effective, or approved for treating a specific patient. UpToDate, Inc. and its affiliates disclaim any warranty or liability relating to this information or the use thereof. The use of this information is governed by the Terms of Use, available at https://www.wolters3rd Planetuwer.com/en/know/clinical-effectiveness-terms   Copyright   Copyright © 2024 UpToDate, Inc. and its affiliates and/or licensors. All rights reserved.

## 2025-01-29 NOTE — ASSESSMENT & PLAN NOTE
Feels well.  She is complaining of pelvic pressure and back pain and is interested in seeing our pelvic physical therapy program.  I discussed using a maternity belt for support.  Her urine was concentrated today and I also advised increasing her fluids.  Denies LOF/CTX/VB. Discussed fetal awareness. No concerns. Overview charting updated today.

## 2025-02-01 ENCOUNTER — CLINICAL SUPPORT (OUTPATIENT)
Age: 25
End: 2025-02-01

## 2025-02-01 DIAGNOSIS — Z32.2 ENCOUNTER FOR CHILDBIRTH INSTRUCTION: Primary | ICD-10-CM

## 2025-02-04 ENCOUNTER — EVALUATION (OUTPATIENT)
Dept: PHYSICAL THERAPY | Facility: CLINIC | Age: 25
End: 2025-02-04
Payer: COMMERCIAL

## 2025-02-04 DIAGNOSIS — O99.891 BACK PAIN IN PREGNANCY: ICD-10-CM

## 2025-02-04 DIAGNOSIS — M54.9 BACK PAIN IN PREGNANCY: ICD-10-CM

## 2025-02-04 PROCEDURE — 97161 PT EVAL LOW COMPLEX 20 MIN: CPT

## 2025-02-04 PROCEDURE — 97112 NEUROMUSCULAR REEDUCATION: CPT

## 2025-02-04 NOTE — PROGRESS NOTES
PT Evaluation     Today's date: 2025  Patient name: My Flannery  : 2000  MRN: [id number]  Referring provider: Susanna Solis CRNP  Dx:   Encounter Diagnosis     ICD-10-CM    1. Back pain in pregnancy  O99.891 Ambulatory Referral to Physical Therapy    M54.9           Start Time: 1146  Stop Time: 1220  Total time in clinic (min): 34 minutes    Assessment  Impairments: abnormal or restricted ROM, activity intolerance, impaired physical strength, lacks appropriate home exercise program, pain with function, participation limitations, activity limitations and endurance    Assessment details: Pt is a 24 y.o. female presenting to PT services with c/o chronic low back pain exacerbated by current pregnancy. Pt is 26w6d pregnant at time of evaluation. Pt has no change in symptoms with repeated lumbar movements. Pt has difficulty with core activation. Pt has increased pain with transitional movements. PT added TA activation and QL stretch to pt's HEP, pt verbalized and demonstrated understanding of proper form. Pt is a good candidate for skilled physical therapy in order to improve core stability, decrease low back pain, and increase functional ability.       Goals  STG (3 weeks):  1. Pt will improve TA activation evidenced by palpable contraction while breathing   2. Pt will improve lumbar extension AROM to be WNL and pain free   3. Pt will report pain at worst as 5/10 or less     LTG (6 weeks):  1. Pt will be independent in HEP  2. Pt will improve TA activation evidenced by palpable contraction with dynamic movement   3. Pt will be able to perform bed mobility without increase in pain     Plan  Patient would benefit from: skilled physical therapy and home program  Planned modality interventions: biofeedback, neuromuscular electric stimulation, TENS, thermotherapy: hydrocollator packs and unattended electrical stimulation    Planned therapy interventions: IASTM, joint mobilization, kinesiology taping, manual  "therapy, massage, K[place] taping, abdominal trunk stabilization, nerve gliding, neuromuscular re-education, patient/caregiver education, postural training, strengthening, stretching, therapeutic activities, therapeutic exercise, home exercise program, flexibility, functional ROM exercises and balance    Frequency: 1x week  Duration in weeks: 6  Plan of Care beginning date: 2/4/2025  Plan of Care expiration date: 3/21/2025  Treatment plan discussed with: patient        Subjective Evaluation    History of Present Illness  Mechanism of injury: Pt reports that her back pain started about 3 years ago after she gave birth. She states that if she is sitting or laying down, her back locks up and she needs help getting up. She has tried the lidocaine patches but that didn't help. She states that sometimes her tailbone hurts too. She states that her back pain is worse now that she's pregnant, the pain used to come and go but now it's consistent.   Patient Goals  Patient goals for therapy: increased strength, decreased pain, improved balance and increased motion  Patient goal: \"to lay down without my back hurting.\"  Pain  Current pain rating: 3  At best pain rating: 3  At worst pain rating: 10  Location: midline low back, sometimes to R upper thigh  Quality: tight, sharp and needle-like  Alleviating factors: nothing.  Aggravating factors: sitting (laying down for too long)    Social Support  Steps to enter house: yes (2 steps, bilateral hand rails)  Stairs in house: no   Lives in: one-story house  Lives with: 3 year old daugher, 2 dogs.    Employment status: working ([employer] )  Hand dominance: right          Objective     Active Range of Motion     Lumbar   Flexion:  WFL  Extension:  with pain Restriction level: minimal  Left lateral flexion: Active left lumbar lateral flexion: tight.    WFL  Right lateral flexion: Active right lumbar lateral flexion: tight.  [facility]    Strength/Myotome Testing " "    Left Hip   Planes of Motion   Flexion: 4+    Right Hip   Planes of Motion   Flexion: 4+    Left Knee   Flexion: 5  Extension: 5    Right Knee   Flexion: 5  Extension: 5             Precautions: pregnant, asthma, depression  Access Code: [email or web address]    POC expires Unit limit Auth Expiration date PT/OT/ST + Visit Limit?   3/21/25 BOMN 12/31/25 BOMN                           Visit/Unit Tracking  AUTH Status:  Date 2/4              Required after 24th visit Used 1               Remaining                    Date 2/4            Re-Eval             FOTO             Manuals                                                                 Neuro Re-Ed             TA activation  5\"x20 HL Qped           Rows             Paloff press             PB press stand             PB diagonals stand             Bird dog             Dead bug                                                     Ther Ex             Bike             QL stretch 15\"x5 sit            LTR                                                                              Ther Activity                                       Gait Training                                       Modalities                                            "

## 2025-02-08 ENCOUNTER — NURSE TRIAGE (OUTPATIENT)
Dept: OTHER | Facility: OTHER | Age: 25
End: 2025-02-08

## 2025-02-08 NOTE — TELEPHONE ENCOUNTER
"Regardin week pregnant/ Cramping  ----- Message from Brittney VILLANUEVA sent at 2025 11:26 AM EST -----  Pt stated, \" I started cramping yesterday and they felt like contractions. I would like to speak with someone on call. \"    "

## 2025-02-08 NOTE — TELEPHONE ENCOUNTER
"Reason for Disposition   [1] MILD abdominal pain (e.g., doesn't interfere with normal activities) or tightening AND [2] constant AND [3] present > 2 hours    Answer Assessment - Initial Assessment Questions  1. LOCATION: \"Where does it hurt?\"         Poking sensation, very light,     Yesterday had episode of sharp pain    2. RADIATION: \"Does the pain shoot anywhere else?\" (e.g., chest, back)        Shooting type of pain into uterus    3. ONSET: \"When did the pain begin?\" (Minutes, hours or days ago)         Sharp pain yesterday, has happened before but not has sharp     4. SUDDEN: \"Gradual or sudden onset?\"        Sudden    5. PATTERN: \"Does the pain come and go, or has it been constant since it started?\"         Intermittent  Random     6. SEVERITY: \"How bad is the pain?\" \"What does it keep you from doing?\"  (e.g., Scale 1-10; mild, moderate, or severe)        Yesterday 10/10, screamed, less than 2 minutes    7. RECURRENT SYMPTOM: \"Have you ever had this type of stomach pain before?\" If Yes, ask: \"When was the last time?\" and \"What happened that time?\"         Past week    8. CAUSE: \"What do you think is causing the stomach pain?      Unknown    9. RELIEVING/AGGRAVATING FACTORS: \"What makes it better or worse?\" (e.g., antacids, bowel movement, movement)      Denies    10. FETAL MOVEMENT: \"Has the baby's movement decreased or changed significantly from normal?\"          Not as much but still moving    11. OTHER SYMPTOMS: \"Do you have any other symptoms?\" (e.g., back pain, contractions, diarrhea, fever, headache, urination pain, vaginal bleeding, vaginal discharge, vomiting)          Denies vaginal bleeding or discharge  Intermittent headaches    12. PEPE: \"What date are you expecting to deliver?\"          May 7    Protocols used: Pregnancy - Abdominal Pain Greater Than 20 Weeks EGA-Adult-      On call provider recommends L&D evaluation.  Patient agreeable to be seen.  "

## 2025-02-10 NOTE — TELEPHONE ENCOUNTER
Left vm for patient to check if she is still having pelvic pain/cramping. Advised patient to please call back and if she is still going to L&D as recommended by on call provider.

## 2025-02-11 ENCOUNTER — APPOINTMENT (OUTPATIENT)
Dept: LAB | Facility: HOSPITAL | Age: 25
End: 2025-02-11
Payer: COMMERCIAL

## 2025-02-11 DIAGNOSIS — Z3A.26 26 WEEKS GESTATION OF PREGNANCY: ICD-10-CM

## 2025-02-11 PROBLEM — Z3A.28 28 WEEKS GESTATION OF PREGNANCY: Status: ACTIVE | Noted: 2024-10-01

## 2025-02-11 LAB
BASOPHILS # BLD AUTO: 0.02 THOUSANDS/ΜL (ref 0–0.1)
BASOPHILS NFR BLD AUTO: 0 % (ref 0–1)
EOSINOPHIL # BLD AUTO: 0.13 THOUSAND/ΜL (ref 0–0.61)
EOSINOPHIL NFR BLD AUTO: 2 % (ref 0–6)
ERYTHROCYTE [DISTWIDTH] IN BLOOD BY AUTOMATED COUNT: 12.5 % (ref 11.6–15.1)
FERRITIN SERPL-MCNC: 5 NG/ML (ref 11–307)
GLUCOSE 1H P 50 G GLC PO SERPL-MCNC: 117 MG/DL (ref 70–134)
HCT VFR BLD AUTO: 29.2 % (ref 34.8–46.1)
HGB BLD-MCNC: 9.2 G/DL (ref 11.5–15.4)
IMM GRANULOCYTES # BLD AUTO: 0.02 THOUSAND/UL (ref 0–0.2)
IMM GRANULOCYTES NFR BLD AUTO: 0 % (ref 0–2)
LYMPHOCYTES # BLD AUTO: 1.52 THOUSANDS/ΜL (ref 0.6–4.47)
LYMPHOCYTES NFR BLD AUTO: 21 % (ref 14–44)
MCH RBC QN AUTO: 30.2 PG (ref 26.8–34.3)
MCHC RBC AUTO-ENTMCNC: 31.5 G/DL (ref 31.4–37.4)
MCV RBC AUTO: 96 FL (ref 82–98)
MONOCYTES # BLD AUTO: 0.48 THOUSAND/ΜL (ref 0.17–1.22)
MONOCYTES NFR BLD AUTO: 7 % (ref 4–12)
NEUTROPHILS # BLD AUTO: 5.15 THOUSANDS/ΜL (ref 1.85–7.62)
NEUTS SEG NFR BLD AUTO: 70 % (ref 43–75)
NRBC BLD AUTO-RTO: 0 /100 WBCS
PLATELET # BLD AUTO: 216 THOUSANDS/UL (ref 149–390)
PMV BLD AUTO: 10.8 FL (ref 8.9–12.7)
RBC # BLD AUTO: 3.05 MILLION/UL (ref 3.81–5.12)
WBC # BLD AUTO: 7.32 THOUSAND/UL (ref 4.31–10.16)

## 2025-02-11 PROCEDURE — 36415 COLL VENOUS BLD VENIPUNCTURE: CPT

## 2025-02-11 PROCEDURE — 82950 GLUCOSE TEST: CPT

## 2025-02-11 PROCEDURE — 86780 TREPONEMA PALLIDUM: CPT

## 2025-02-11 PROCEDURE — 82728 ASSAY OF FERRITIN: CPT

## 2025-02-11 PROCEDURE — 85025 COMPLETE CBC W/AUTO DIFF WBC: CPT

## 2025-02-11 NOTE — PROGRESS NOTES
Name: My Flannery      : 2000      MRN: [id number]  Encounter Provider: ANGIE Freitas  Encounter Date: 2025   Encounter department: [facility]  :  Assessment & Plan  Prenatal care, subsequent pregnancy, second trimester  She feels well. She denies LOF/CTX/VB. She did not have any concerns today. We discussed fetal kick counting. Overview charting updated today.         28 weeks gestation of pregnancy  nit. PNBW-completed  NIPT-low risk  AFP- neg  Level 2 US  Progesterone QHS, Pelvic Rest until third trimester    L2 on   FG at 32 wks on 3/10  Cervical checks for shortening, repeat check on  OK, pelvic rest reinforced  Flu shot declined, discussed precautions    ASCUS HPV neg 10/2024, repeat 3 yrs  Yellow given  Delivery consent- took it home  Tdap next visit per pt request  Birth Plan-  Breast pump- ordered         Short cervix during pregnancy in second trimester  Continue progesterone until 36 weeks and 6 days per MFM note.       Positive test for herpes simplex virus antibody  Discussed prophylaxis at 36 weeks.       Anemia during pregnancy in third trimester  Recheck CBC and ferritin at 32 weeks.  Orders:    ferrous sulfate 324 (65 Fe) mg; Take 1 tablet (324 mg total) by mouth daily before breakfast

## 2025-02-11 NOTE — PATIENT INSTRUCTIONS
Patient Education     Pregnancy - The Sixth Month   About this topic   It is important for you to learn how to take care of yourself to help you have a healthy baby and safe delivery. It is good to have health care throughout your pregnancy.  The sixth month of your pregnancy starts around week 24 and lasts through week 28. By knowing how far along you are, you can learn what is normal for this stage of your pregnancy and plan for what is next.  General   Your body   During the sixth month of your pregnancy, here are some things you can expect.  You may:  Start to gain a little more weight. It is normal to gain about 10 to 15 pounds (4.5 to 7 kg) total in your first 6 months.  Have problems like back pain, dry eyes, or aching hands and wrists  Itching of palms, abdomen, or soles of your feet  Swelling of ankles, fingers, or face.  Need to urinate more frequently.  Have problems with hemorrhoids. Be sure to eat plenty of fresh fruits and vegetables to increase the fiber in your diet. Drink plenty of water to help keep your stools soft.  Continue having Julián Luna contractions. You may feel your belly squeezing or getting tight.  Have a glucose test to test for gestational diabetes.  Have more headaches. Talk to your doctor about how to help with them.  See stretch marks on your belly, breasts, or legs. Stay active to try and keep good muscle tone.  See an increase in vaginal discharge. Talk to your doctor about what to expect.  Your baby's growth and development:  Your baby looks like a very small  baby.  They are able to live outside of your womb but would need a lot of help breathing, eating, and staying warm in an intensive care unit.  Your baby has times of being awake and times of being asleep. The baby’s eyelids are able to open and close.  They move more and have hiccups.  Your baby is about 14 inches (36 cm) long and weighs about 2 pounds (900 gm). Your baby is about the size of an eggplant.  Baby  may be able to hear voices.  Things to Think About   Avoid alcohol, drugs, tobacco products, and second hand smoke  Eat small meals throughout the day instead of larger meals.  Avoid spicy, greasy, or fatty foods.  Avoid lying down or bending over for around 3 hours after eating  Warm baths are fine to help you relax. Avoid hot tubs while you are pregnant.  Avoid straining when having bowel movements.  Learning how to care for your baby. You may want to sign up for classes on how to take care of a .  Are you planning on going back to work after having your baby? It’s not too early to think about .  Consider starting your birth plan if you have specific needs or wishes for delivery.  When do I need to call the doctor?   Contractions every 10 minutes or more often that do not go away with drinking water or position changes  Low, dull back pain that does not go away  Pressure in your pelvis that feels like your baby is pushing down  A gush or constant trickle of watery or bloody fluid leaking from your vagina  Cramps in your lower belly that come and go or are constant  Change in your baby's movement  Fever of 100.4°F (38°C) or higher  Little to no movement felt by baby in 2 hours. Your baby should be moving at least 10 times every 2 hours.  Headache that does not go away; blurry vision; seeing spots or halos; increase in swelling in your hands, feet, or face; and pain under your ribs on the right side  Vaginal bleeding with or without pain  After a car accident, fall, or any trauma to your belly  Having thoughts of harming yourself or others, or do not feel safe at home  Last Reviewed Date   2020  Consumer Information Use and Disclaimer   This generalized information is a limited summary of diagnosis, treatment, and/or medication information. It is not meant to be comprehensive and should be used as a tool to help the user understand and/or assess potential diagnostic and treatment options. It does  NOT include all information about conditions, treatments, medications, side effects, or risks that may apply to a specific patient. It is not intended to be medical advice or a substitute for the medical advice, diagnosis, or treatment of a health care provider based on the health care provider's examination and assessment of a patient’s specific and unique circumstances. Patients must speak with a health care provider for complete information about their health, medical questions, and treatment options, including any risks or benefits regarding use of medications. This information does not endorse any treatments or medications as safe, effective, or approved for treating a specific patient. UpToDate, Inc. and its affiliates disclaim any warranty or liability relating to this information or the use thereof. The use of this information is governed by the Terms of Use, available at https://www.woltersEnsendauwer.com/en/know/clinical-effectiveness-terms   Copyright   Copyright © 2024 UpToDate, Inc. and its affiliates and/or licensors. All rights reserved.

## 2025-02-11 NOTE — ASSESSMENT & PLAN NOTE
nit. PNBW-completed  NIPT-low risk  AFP- neg  Level 2 US  Progesterone QHS, Pelvic Rest until third trimester    L2 on 12/30  FG at 32 wks on 3/10  Cervical checks for shortening, repeat check on 1/7 OK, pelvic rest reinforced  Flu shot declined, discussed precautions    ASCUS HPV neg 10/2024, repeat 3 yrs  Yellow given  Delivery consent- took it home  Tdap next visit per pt request  Birth Plan-  Breast pump- ordered

## 2025-02-12 ENCOUNTER — RESULTS FOLLOW-UP (OUTPATIENT)
Age: 25
End: 2025-02-12

## 2025-02-12 ENCOUNTER — APPOINTMENT (OUTPATIENT)
Dept: PHYSICAL THERAPY | Facility: CLINIC | Age: 25
End: 2025-02-12
Payer: COMMERCIAL

## 2025-02-12 ENCOUNTER — ROUTINE PRENATAL (OUTPATIENT)
Dept: OBGYN CLINIC | Facility: CLINIC | Age: 25
End: 2025-02-12
Payer: COMMERCIAL

## 2025-02-12 ENCOUNTER — TELEPHONE (OUTPATIENT)
Dept: OBGYN CLINIC | Facility: CLINIC | Age: 25
End: 2025-02-12

## 2025-02-12 VITALS — SYSTOLIC BLOOD PRESSURE: 112 MMHG | DIASTOLIC BLOOD PRESSURE: 70 MMHG | BODY MASS INDEX: 30 KG/M2 | WEIGHT: 164 LBS

## 2025-02-12 DIAGNOSIS — R89.4 POSITIVE TEST FOR HERPES SIMPLEX VIRUS ANTIBODY: ICD-10-CM

## 2025-02-12 DIAGNOSIS — Z34.82 PRENATAL CARE, SUBSEQUENT PREGNANCY, SECOND TRIMESTER: Primary | ICD-10-CM

## 2025-02-12 DIAGNOSIS — Z3A.28 28 WEEKS GESTATION OF PREGNANCY: ICD-10-CM

## 2025-02-12 DIAGNOSIS — O99.013 ANEMIA DURING PREGNANCY IN THIRD TRIMESTER: ICD-10-CM

## 2025-02-12 DIAGNOSIS — O26.872 SHORT CERVIX DURING PREGNANCY IN SECOND TRIMESTER: ICD-10-CM

## 2025-02-12 LAB
SL AMB  POCT GLUCOSE, UA: NORMAL
SL AMB POCT URINE PROTEIN: NORMAL
TREPONEMA PALLIDUM IGG+IGM AB [PRESENCE] IN SERUM OR PLASMA BY IMMUNOASSAY: NORMAL

## 2025-02-12 PROCEDURE — 81002 URINALYSIS NONAUTO W/O SCOPE: CPT | Performed by: NURSE PRACTITIONER

## 2025-02-12 PROCEDURE — 99213 OFFICE O/P EST LOW 20 MIN: CPT | Performed by: NURSE PRACTITIONER

## 2025-02-12 RX ORDER — FERROUS SULFATE 324(65)MG
324 TABLET, DELAYED RELEASE (ENTERIC COATED) ORAL
Qty: 30 TABLET | Refills: 3 | Status: SHIPPED | OUTPATIENT
Start: 2025-02-12 | End: 2025-03-14

## 2025-02-13 NOTE — TELEPHONE ENCOUNTER
PA for ferrous sulfate 324 (65 Fe) mg SUBMITTED to [employer]    via    [x]CMM-KEY: [id number]   []Surescripts-Case ID #   []Availity-Auth ID # NDC #   []Faxed to plan   []Other website   []Phone call Case ID #     []PA sent as URGENT    All office notes, labs and other pertaining documents and studies sent. Clinical questions answered. Awaiting determination from insurance company.     Turnaround time for your insurance to make a decision on your Prior Authorization can take 7-21 business days.

## 2025-02-17 NOTE — TELEPHONE ENCOUNTER
PA for ferrous sulfate 324 (65 Fe) mg DENIED    Reason:(Screenshot if applicable)        Message sent to office clinical pool Yes    Denial letter scanned into Media Yes    Appeal started No (Provider will need to decide if appeal is warranted and send clinical documentation to Prior Authorization Team for initiation.)    **Please follow up with your patient regarding denial and next steps**

## 2025-02-18 ENCOUNTER — NURSE TRIAGE (OUTPATIENT)
Age: 25
End: 2025-02-18

## 2025-02-18 ENCOUNTER — HOSPITAL ENCOUNTER (OUTPATIENT)
Facility: HOSPITAL | Age: 25
Discharge: HOME | End: 2025-02-18
Attending: STUDENT IN AN ORGANIZED HEALTH CARE EDUCATION/TRAINING PROGRAM | Admitting: STUDENT IN AN ORGANIZED HEALTH CARE EDUCATION/TRAINING PROGRAM
Payer: COMMERCIAL

## 2025-02-18 ENCOUNTER — APPOINTMENT (OUTPATIENT)
Dept: PHYSICAL THERAPY | Facility: CLINIC | Age: 25
End: 2025-02-18
Payer: COMMERCIAL

## 2025-02-18 VITALS
HEART RATE: 94 BPM | TEMPERATURE: 98.1 F | RESPIRATION RATE: 18 BRPM | OXYGEN SATURATION: 98 % | DIASTOLIC BLOOD PRESSURE: 61 MMHG | SYSTOLIC BLOOD PRESSURE: 109 MMHG

## 2025-02-18 PROBLEM — O36.8190 DECREASED FETAL MOVEMENT AFFECTING MANAGEMENT OF MOTHER, ANTEPARTUM: Status: ACTIVE | Noted: 2025-02-18

## 2025-02-18 PROCEDURE — 99214 OFFICE O/P EST MOD 30 MIN: CPT

## 2025-02-18 PROCEDURE — NC001 PR NO CHARGE: Performed by: STUDENT IN AN ORGANIZED HEALTH CARE EDUCATION/TRAINING PROGRAM

## 2025-02-18 NOTE — TELEPHONE ENCOUNTER
"Spoke with patient who is 28w6d, .  She reports for the last 3 days she has been having sharp, intermittent abd pain, 6/10.  She believes they are contractions.  She denies any vaginal bleeding/LOF.  She reports decreased fetal movement today but has not done kick counts.  She was advised to go to L&D for further evaluation.  Pt verbalized understanding, no further questions or concerns at this time.       SEC to on call provider and L&D Charge TAMMY Escobedo.      Reason for Disposition   MODERATE-SEVERE abdominal pain (e.g., interferes with normal activities, awakens from sleep)    Answer Assessment - Initial Assessment Questions  1. LOCATION: \"Where does it hurt?\"       abd  2. RADIATION: \"Does the pain shoot anywhere else?\" (e.g., chest, back)      denies  3. ONSET: \"When did the pain begin?\" (Minutes, hours or days ago)       3 days ago  4. SUDDEN: \"Gradual or sudden onset?\"      gradual  5. PATTERN: \"Does the pain come and go, or has it been constant since it started?\"       intermittent  6. SEVERITY: \"How bad is the pain?\" \"What does it keep you from doing?\"  (e.g., Scale 1-10; mild, moderate, or severe)      6/10 today  7. RECURRENT SYMPTOM: \"Have you ever had this type of stomach pain before?\" If Yes, ask: \"When was the last time?\" and \"What happened that time?\"       Contractions in previous pregnancy  8. CAUSE: \"What do you think is causing the stomach pain?      unsure  9. RELIEVING/AGGRAVATING FACTORS: \"What makes it better or worse?\" (e.g., antacids, bowel movement, movement)      denies  10. FETAL MOVEMENT: \"Has the baby's movement decreased or changed significantly from normal?\"        Decreased fetal movement  11. OTHER SYMPTOMS: \"Do you have any other symptoms?\" (e.g., back pain, contractions, diarrhea, fever, headache, urination pain, vaginal bleeding, vaginal discharge, vomiting)        contractions  12. PEPE: \"What date are you expecting to deliver?\"        25    Protocols used: Pregnancy - " Abdominal Pain Greater Than 20 Weeks EGA-Adult-[place]

## 2025-02-18 NOTE — TELEPHONE ENCOUNTER
Pt returned call. Reviewed that the iron prescription was denied but she can purchase OTC. Pt verbalized understanding and is thankful.

## 2025-02-19 NOTE — PROGRESS NOTES
L&D Triage Note - OB/GYN  My Flannery 24 y.o. female MRN: [id number]  Unit/Bed#:  TRIAGE  Encounter: [id number]      ASSESSMENT:    My Flannery is a 24 y.o.  at 28w6d presenting with decreased fetal movement.     PLAN:    1) Decreased fetal movement  - started this afternoon 2025 ~2pm, resolved after arrival to triage  - KELLY 2.96cm/2.66cm/6.91cm/3.43cm (total 15.96cm)  - fetal movement present on US, cardiac activity present on FHT    2) Pelvic girdle pain  - urine dipstick w/ reflex to cx, GC/CT negative  - SVE fingertip/30/-3   - [place]: no contractions on monitor  - speculum: pooling negative, dry slide negative, wet mount negative  - nitrazine negative  - cervical length 2.37cm/2.59cm      3) 28 weeks 6 days gestation of pregnancy  - Continue routine prenatal care  - Discharge from OB triage with  labor precautions    - Reviewed rupture of membranes, false vs true labor, decreased fetal movement, and vaginal bleeding   - Pt to call provider with any concerns and follow up at her next scheduled prenatal appointment    - Case discussed with Dr. Petersen    SUBJECTIVE:    My Flannery 24 y.o.  at 28w6d with an Estimated Date of Delivery: 25 who presents to triage for decreased fetal movement and contractions. She reports feeling contractions since yesterday that are irregular and now feels an intermittent sharp stabbing pain in her pelvic region and vaginal area. She additionally notes that she was concerned about fetal movement and was unable to detect any fetal kick counts for 2 hours at around 2pm today, and she was told to come to triage. She otherwise denies leakage of fluid and vaginal bleeding. No hx of GDM, pre-eclampsia.    Her current obstetrical history is significant for subchronic hematoma in first trimester, short cervix, HSV, anemia during pregnancy.    Her past obstetrical history is significant for 4 prior gestations, 3 IABs and 1 term delivery, short  cervix, HSV, anemia during pregnancy.      ROS:  Constitutional: Negative for fever, chills  Respiratory: Negative for cough, SOB  Cardiovascular: Negative for chest pain, palpitations    Gastrointestinal: Negative for increased N/V/D    General Physical Exam:  General: Well appearing, no distress  Respiratory: Unlabored breathing  Cardiovascular: Regular rate.  Abdomen: Soft, gravid, nontender  Fundal Height: Appropriate for gestational age.  Extremities: Warm and well perfused.  Non tender.      Cervical Exam  Speculum: Cervical os is fingertip length  SVE: Cervical Dilation: Fingertip  Cervical Effacement: 30  Cervical Consistency: Firm  Fetal Station: -3  Presentation: Vertex  OB Examiner: Fito    FETAL ASSESSMENT:  Fetal heart rate: Baseline Rate (FHR): 135 bpm  Variability: Moderate  Accelerations: 10 x 10 (<32 weeks)  Decelerations: None  FHR Category: Category I  [place]: Contraction Frequency (minutes): 0  Contraction Duration (seconds): 0    KOH/WTMT:     Infection:   - No clue cells    - No hyphae   - No trichomonads present    Membrane status   - No ferning   - No nitrazene   - No pooling     Urine Dip    - Negative.    Imaging:       TVUS   - Cervical length    - 2.37 cm    - 2.59 cm        Abd. US   KELLY      - Q1 2.96 cm     - Q2 2.66 cm     - Q3 6.91 cm     - Q4 3.43 cm     - Total: 15.96 cm      Adriel Payton MD  OBGYN, PGY-1  02/18/25  11:58 PM

## 2025-02-26 ENCOUNTER — ROUTINE PRENATAL (OUTPATIENT)
Dept: OBGYN CLINIC | Facility: CLINIC | Age: 25
End: 2025-02-26
Payer: COMMERCIAL

## 2025-02-26 VITALS — WEIGHT: 165.2 LBS | DIASTOLIC BLOOD PRESSURE: 62 MMHG | SYSTOLIC BLOOD PRESSURE: 114 MMHG | BODY MASS INDEX: 30.22 KG/M2

## 2025-02-26 DIAGNOSIS — Z34.83 PRENATAL CARE, SUBSEQUENT PREGNANCY IN THIRD TRIMESTER: Primary | ICD-10-CM

## 2025-02-26 DIAGNOSIS — O99.013 ANEMIA DURING PREGNANCY IN THIRD TRIMESTER: ICD-10-CM

## 2025-02-26 DIAGNOSIS — R89.4 POSITIVE TEST FOR HERPES SIMPLEX VIRUS ANTIBODY: ICD-10-CM

## 2025-02-26 DIAGNOSIS — O26.872 SHORT CERVIX DURING PREGNANCY IN SECOND TRIMESTER: ICD-10-CM

## 2025-02-26 DIAGNOSIS — Z3A.30 30 WEEKS GESTATION OF PREGNANCY: ICD-10-CM

## 2025-02-26 LAB
SL AMB  POCT GLUCOSE, UA: NORMAL
SL AMB POCT URINE PROTEIN: NORMAL

## 2025-02-26 PROCEDURE — 81002 URINALYSIS NONAUTO W/O SCOPE: CPT | Performed by: OBSTETRICS & GYNECOLOGY

## 2025-02-26 PROCEDURE — 99213 OFFICE O/P EST LOW 20 MIN: CPT | Performed by: OBSTETRICS & GYNECOLOGY

## 2025-02-26 NOTE — ASSESSMENT & PLAN NOTE
Shortened cervix: plan on vaginal progesterone until 37 weeks  PNC follow up scan 32 weeks    Labs:Iron deficiency anemia - reviewed iron tablet vs iron infusions, follow up cbc, ferritin 4 weeks

## 2025-02-26 NOTE — PROGRESS NOTES
Name: My Flannery      : 2000      MRN: [id number]  Encounter Provider: Татьяна Wakefield MD  Encounter Date: 2025   Encounter department: [facility]  :  Assessment & Plan  Prenatal care, subsequent pregnancy in third trimester    Orders:    POCT urine dip    30 weeks gestation of pregnancy  Shortened cervix: plan on vaginal progesterone until 37 weeks  PNC follow up scan 32 weeks    Labs:Iron deficiency anemia - reviewed iron tablet vs iron infusions, follow up cbc, ferritin 4 weeks         Short cervix during pregnancy in second trimester         Anemia during pregnancy in third trimester    Orders:    CBC (Includes Diff/Plt) (Refl); Future    Ferritin; Future    Positive test for herpes simplex virus antibody  In  had cold sore and was sent for testing, will retest at this time. Unclear if prophylaxis is really needed in this instance. No genital lesions previously.   Orders:    Herpes I/II IgG CLAUDIA w Reflex to HSV-2; Future        History of Present Illness   HPI  My Flannery is a 24 y.o. female who presents for prenatal ob visit today.  GA: 30w0d  PEPE: 25    Denies LOF, VB, CTX  +FM    Urine: Protein neg / Glucose neg  Labs utd    Blue folder given at intake  Yellow folder given   Delivery consent took home last visit--returned today for signing  Birth plan returned  Breast pump ordered  Tdap deferred again today  Flu defer  Rhogam not needed. A+    No question's or concerns at this time.

## 2025-02-27 ENCOUNTER — TELEPHONE (OUTPATIENT)
Dept: OBGYN CLINIC | Facility: CLINIC | Age: 25
End: 2025-02-27

## 2025-03-03 ENCOUNTER — PATIENT MESSAGE (OUTPATIENT)
Dept: OBGYN CLINIC | Facility: CLINIC | Age: 25
End: 2025-03-03

## 2025-03-03 PROBLEM — Z3A.31 31 WEEKS GESTATION OF PREGNANCY: Status: ACTIVE | Noted: 2025-03-03

## 2025-03-10 ENCOUNTER — ULTRASOUND (OUTPATIENT)
Dept: PERINATAL CARE | Facility: OTHER | Age: 25
End: 2025-03-10
Payer: COMMERCIAL

## 2025-03-10 VITALS
SYSTOLIC BLOOD PRESSURE: 104 MMHG | BODY MASS INDEX: 30.62 KG/M2 | HEIGHT: 62 IN | HEART RATE: 90 BPM | WEIGHT: 166.4 LBS | DIASTOLIC BLOOD PRESSURE: 52 MMHG

## 2025-03-10 DIAGNOSIS — Z3A.31 31 WEEKS GESTATION OF PREGNANCY: Primary | ICD-10-CM

## 2025-03-10 DIAGNOSIS — Z36.89 ENCOUNTER FOR ULTRASOUND TO ASSESS FETAL GROWTH: ICD-10-CM

## 2025-03-10 DIAGNOSIS — O26.872 SHORT CERVIX DURING PREGNANCY IN SECOND TRIMESTER: ICD-10-CM

## 2025-03-10 PROCEDURE — 76816 OB US FOLLOW-UP PER FETUS: CPT | Performed by: OBSTETRICS & GYNECOLOGY

## 2025-03-10 PROCEDURE — 99213 OFFICE O/P EST LOW 20 MIN: CPT

## 2025-03-10 NOTE — PROGRESS NOTES
"[facility]: My Flannery was seen today for fetal growth assessment ultrasound.  See ultrasound report under \"OB Procedures\" tab.   I supervised the Advanced Practitioner remotely via Telehealth and the ultrasound was interpreted by me remotely. The majority of the time was spent by Cynthia ADAMS. Please don't hesitate to contact our office with any concerns or questions.  -Merly Evans MD    "

## 2025-03-11 ENCOUNTER — RESULTS FOLLOW-UP (OUTPATIENT)
Dept: OBGYN CLINIC | Facility: CLINIC | Age: 25
End: 2025-03-11

## 2025-03-11 ENCOUNTER — APPOINTMENT (OUTPATIENT)
Dept: LAB | Facility: HOSPITAL | Age: 25
End: 2025-03-11
Payer: COMMERCIAL

## 2025-03-11 DIAGNOSIS — R89.4 POSITIVE TEST FOR HERPES SIMPLEX VIRUS ANTIBODY: ICD-10-CM

## 2025-03-11 DIAGNOSIS — R89.4 POSITIVE TEST FOR HERPES SIMPLEX VIRUS ANTIBODY: Primary | ICD-10-CM

## 2025-03-11 DIAGNOSIS — O99.013 ANEMIA DURING PREGNANCY IN THIRD TRIMESTER: ICD-10-CM

## 2025-03-11 PROBLEM — D50.9 IRON DEFICIENCY ANEMIA: Status: ACTIVE | Noted: 2025-03-11

## 2025-03-11 PROBLEM — Z3A.32 32 WEEKS GESTATION OF PREGNANCY: Status: ACTIVE | Noted: 2025-03-03

## 2025-03-11 PROBLEM — O26.879 SHORT CERVIX AFFECTING PREGNANCY: Status: ACTIVE | Noted: 2024-12-30

## 2025-03-11 PROBLEM — O36.8190 DECREASED FETAL MOVEMENT AFFECTING MANAGEMENT OF MOTHER, ANTEPARTUM: Status: RESOLVED | Noted: 2025-02-18 | Resolved: 2025-03-11

## 2025-03-11 LAB
BASOPHILS # BLD AUTO: 0.02 THOUSANDS/ÂΜL (ref 0–0.1)
BASOPHILS NFR BLD AUTO: 0 % (ref 0–1)
EOSINOPHIL # BLD AUTO: 0.13 THOUSAND/ÂΜL (ref 0–0.61)
EOSINOPHIL NFR BLD AUTO: 2 % (ref 0–6)
ERYTHROCYTE [DISTWIDTH] IN BLOOD BY AUTOMATED COUNT: 13.1 % (ref 11.6–15.1)
FERRITIN SERPL-MCNC: 6 NG/ML (ref 11–307)
HCT VFR BLD AUTO: 28.7 % (ref 34.8–46.1)
HGB BLD-MCNC: 9.2 G/DL (ref 11.5–15.4)
IMM GRANULOCYTES # BLD AUTO: 0.04 THOUSAND/UL (ref 0–0.2)
IMM GRANULOCYTES NFR BLD AUTO: 1 % (ref 0–2)
LYMPHOCYTES # BLD AUTO: 1.96 THOUSANDS/ÂΜL (ref 0.6–4.47)
LYMPHOCYTES NFR BLD AUTO: 26 % (ref 14–44)
MCH RBC QN AUTO: 29.6 PG (ref 26.8–34.3)
MCHC RBC AUTO-ENTMCNC: 32.1 G/DL (ref 31.4–37.4)
MCV RBC AUTO: 92 FL (ref 82–98)
MONOCYTES # BLD AUTO: 0.58 THOUSAND/ÂΜL (ref 0.17–1.22)
MONOCYTES NFR BLD AUTO: 8 % (ref 4–12)
NEUTROPHILS # BLD AUTO: 4.96 THOUSANDS/ÂΜL (ref 1.85–7.62)
NEUTS SEG NFR BLD AUTO: 63 % (ref 43–75)
NRBC BLD AUTO-RTO: 0 /100 WBCS
PLATELET # BLD AUTO: 206 THOUSANDS/UL (ref 149–390)
PMV BLD AUTO: 10.4 FL (ref 8.9–12.7)
RBC # BLD AUTO: 3.11 MILLION/UL (ref 3.81–5.12)
WBC # BLD AUTO: 7.69 THOUSAND/UL (ref 4.31–10.16)

## 2025-03-11 PROCEDURE — 82728 ASSAY OF FERRITIN: CPT

## 2025-03-11 PROCEDURE — 85025 COMPLETE CBC W/AUTO DIFF WBC: CPT

## 2025-03-11 PROCEDURE — 86695 HERPES SIMPLEX TYPE 1 TEST: CPT

## 2025-03-11 PROCEDURE — 86696 HERPES SIMPLEX TYPE 2 TEST: CPT

## 2025-03-11 PROCEDURE — 36415 COLL VENOUS BLD VENIPUNCTURE: CPT

## 2025-03-11 NOTE — PATIENT INSTRUCTIONS
Patient Education     Pregnancy - The Seventh Month   About this topic   It is important for you to learn how to take care of yourself to help you have a healthy baby and safe delivery. It is good to have health care throughout your pregnancy.  The seventh month of your pregnancy starts around week 29 and lasts through week 32. By knowing how far along you are, you can learn what is normal for this stage of your pregnancy and plan for what is next.  General   Your body   During the seventh month of your pregnancy, here are some things you can expect.  You may:  Have weight gain of about 15 to 20 pounds (6.8 to 9 kg) total in your first 7 months.  Have more trouble moving about and sleeping as the baby gets bigger  Have very vivid dreams  Notice more vaginal discharge  Notice a creamy, watery substance leaking from your nipples  Need a shot called Rh immune globulin if your blood type may be different from your baby's  Your baby's growth and development:  Your baby is gaining weight and adding layers of fat.  Your baby turns to be head down, into the position for delivery.  They are able to respond to loud noises and to dream.  Your baby moves at least 10 times in 2 hours. If your baby isn't moving this much, talk to your doctor right away.  Your baby is about 16 inches (42 cm) long and weighs about 4 pounds (1,800 gm). Your baby is about the size of squash.  Things to Think About   Avoid alcohol, drugs, tobacco products, and second hand smoke  Think about what you want your baby's birth to be like. Who do you want with you?  Find out about what lactation services are covered by your insurance.  Look into lactation consultants and breastfeeding classes.  Where do you want to deliver? It’s time to make a plan for labor and delivery.  Plan on getting a car seat and other things for your baby.  Talk to your doctor if you plan to travel or get on a plane.  When do I need to call the doctor?   Contractions every 10  minutes or more often that do not go away with drinking water or position changes.  Low, dull back pain that does not go away  Feeling unusually dizzy or tired  Pressure in your pelvis that feels like your baby is pushing down  A gush or constant trickle of watery or bloody fluid leaking from your vagina  Cramps in your lower belly that come and go or are constant  Little to no movement felt by baby in 2 hours. Your baby should be moving at least 10 times every 2 hours.  Headache that does not go away; blurry vision; seeing spots or halos; increase in swelling in your hands, feet, or face; and pain under your ribs on the right side  Vaginal bleeding with or without pain  Fever of 100.4°F (38°C) or higher  After a car accident, fall, or any trauma to your belly  Having thoughts of harming yourself or others, or do not feel safe at home  Last Reviewed Date   2020-05-06  Consumer Information Use and Disclaimer   This generalized information is a limited summary of diagnosis, treatment, and/or medication information. It is not meant to be comprehensive and should be used as a tool to help the user understand and/or assess potential diagnostic and treatment options. It does NOT include all information about conditions, treatments, medications, side effects, or risks that may apply to a specific patient. It is not intended to be medical advice or a substitute for the medical advice, diagnosis, or treatment of a health care provider based on the health care provider's examination and assessment of a patient’s specific and unique circumstances. Patients must speak with a health care provider for complete information about their health, medical questions, and treatment options, including any risks or benefits regarding use of medications. This information does not endorse any treatments or medications as safe, effective, or approved for treating a specific patient. UpToDate, Inc. and its affiliates disclaim any warranty or  liability relating to this information or the use thereof. The use of this information is governed by the Terms of Use, available at https://www.wolterskluwer.com/en/know/clinical-effectiveness-terms   Copyright   Copyright © 2024 [employer] and its affiliates and/or licensors. All rights reserved.

## 2025-03-11 NOTE — PROGRESS NOTES
Name: My Flannery      : 2000      MRN: [id number]  Encounter Provider: ANGIE Fretias  Encounter Date: 3/12/2025   Encounter department: [facility]  :  Assessment & Plan  Prenatal care, subsequent pregnancy in third trimester  She feels well. She denies LOF/CTX/VB. She did not have any concerns today. We discussed fetal kick counting. Overview charting updated today.  Colostrum leakage stopped, discussed relief measures.         32 weeks gestation of pregnancy  Prenatal care up-to-date         Short cervix affecting pregnancy  Progesterone until 37 weeks         Positive test for herpes simplex virus antibody  Serology pending       Anemia during pregnancy in third trimester  CBC/Ferritin to be drawn after iron infusion which she has scheduled for tomorrow

## 2025-03-12 ENCOUNTER — ROUTINE PRENATAL (OUTPATIENT)
Dept: OBGYN CLINIC | Facility: CLINIC | Age: 25
End: 2025-03-12
Payer: COMMERCIAL

## 2025-03-12 ENCOUNTER — TELEPHONE (OUTPATIENT)
Dept: OBGYN CLINIC | Facility: CLINIC | Age: 25
End: 2025-03-12

## 2025-03-12 VITALS — WEIGHT: 165 LBS | SYSTOLIC BLOOD PRESSURE: 104 MMHG | BODY MASS INDEX: 30.18 KG/M2 | DIASTOLIC BLOOD PRESSURE: 70 MMHG

## 2025-03-12 DIAGNOSIS — R89.4 POSITIVE TEST FOR HERPES SIMPLEX VIRUS ANTIBODY: ICD-10-CM

## 2025-03-12 DIAGNOSIS — Z3A.32 32 WEEKS GESTATION OF PREGNANCY: ICD-10-CM

## 2025-03-12 DIAGNOSIS — O26.879 SHORT CERVIX AFFECTING PREGNANCY: ICD-10-CM

## 2025-03-12 DIAGNOSIS — Z34.83 PRENATAL CARE, SUBSEQUENT PREGNANCY IN THIRD TRIMESTER: Primary | ICD-10-CM

## 2025-03-12 DIAGNOSIS — O99.013 ANEMIA DURING PREGNANCY IN THIRD TRIMESTER: Primary | ICD-10-CM

## 2025-03-12 DIAGNOSIS — O99.013 ANEMIA DURING PREGNANCY IN THIRD TRIMESTER: ICD-10-CM

## 2025-03-12 DIAGNOSIS — O46.8X1 SUBCHORIONIC HEMATOMA IN FIRST TRIMESTER, SINGLE OR UNSPECIFIED FETUS: ICD-10-CM

## 2025-03-12 DIAGNOSIS — O41.8X10 SUBCHORIONIC HEMATOMA IN FIRST TRIMESTER, SINGLE OR UNSPECIFIED FETUS: ICD-10-CM

## 2025-03-12 LAB
SL AMB  POCT GLUCOSE, UA: NEGATIVE
SL AMB POCT URINE PROTEIN: ABNORMAL

## 2025-03-12 PROCEDURE — 99212 OFFICE O/P EST SF 10 MIN: CPT | Performed by: NURSE PRACTITIONER

## 2025-03-12 PROCEDURE — 81002 URINALYSIS NONAUTO W/O SCOPE: CPT | Performed by: NURSE PRACTITIONER

## 2025-03-12 RX ORDER — SODIUM CHLORIDE 9 MG/ML
20 INJECTION, SOLUTION INTRAVENOUS ONCE
Status: CANCELLED | OUTPATIENT
Start: 2025-03-13

## 2025-03-12 NOTE — TELEPHONE ENCOUNTER
Pt okay to start right away- (if available) before 11 am any day of the week is ideal but if not avail can do any time.

## 2025-03-12 NOTE — TELEPHONE ENCOUNTER
----- Message from Татьяна Wakefield MD sent at 3/11/2025  5:35 PM EDT -----  Dx:Anemia of pregnancy, iron deficiency  Needs IV iron treatment - please schedule for infusions     Venofer 200 mg in 100 mL sodium chloride 0.9%  Twice weekly   Total of 5  1-5  treatments   Please repeat CBC, ferritin 4 weeks after treatment

## 2025-03-12 NOTE — TELEPHONE ENCOUNTER
3/13 8:30 AM     Pt aware of first infusion appointment date and time she is Confirm the rest of her schedule at tomorrows appointment.

## 2025-03-12 NOTE — TELEPHONE ENCOUNTER
[facility] Scanned in chart.

## 2025-03-13 ENCOUNTER — HOSPITAL ENCOUNTER (OUTPATIENT)
Dept: INFUSION CENTER | Facility: CLINIC | Age: 25
Discharge: HOME | End: 2025-03-13
Payer: COMMERCIAL

## 2025-03-13 VITALS
RESPIRATION RATE: 18 BRPM | TEMPERATURE: 97.5 F | DIASTOLIC BLOOD PRESSURE: 51 MMHG | HEART RATE: 83 BPM | SYSTOLIC BLOOD PRESSURE: 102 MMHG

## 2025-03-13 DIAGNOSIS — O99.013 ANEMIA DURING PREGNANCY IN THIRD TRIMESTER: Primary | ICD-10-CM

## 2025-03-13 PROCEDURE — 96365 THER/PROPH/DIAG IV INF INIT: CPT

## 2025-03-13 RX ORDER — SODIUM CHLORIDE 9 MG/ML
20 INJECTION, SOLUTION INTRAVENOUS ONCE
Status: CANCELLED | OUTPATIENT
Start: 2025-03-17

## 2025-03-13 RX ORDER — SODIUM CHLORIDE 9 MG/ML
20 INJECTION, SOLUTION INTRAVENOUS ONCE
Status: COMPLETED | OUTPATIENT
Start: 2025-03-13 | End: 2025-03-13

## 2025-03-13 RX ADMIN — SODIUM CHLORIDE 20 ML/HR: 9 INJECTION, SOLUTION INTRAVENOUS at 08:57

## 2025-03-13 RX ADMIN — IRON SUCROSE 200 MG: 20 INJECTION, SOLUTION INTRAVENOUS at 08:59

## 2025-03-13 NOTE — PROGRESS NOTES
Pt here today for her first iron infusion, offers no complaints, infusion completed w/o complications, aware of there next appt on 3/18 at 8 am, AVS and printed venofer education sheet given and pt D/C home

## 2025-03-14 LAB
HSV2 IGG SERPL QL IA: NEGATIVE
HSV2 IGG SERPL QL IA: POSITIVE

## 2025-03-17 ENCOUNTER — HOSPITAL ENCOUNTER (OUTPATIENT)
Dept: INFUSION CENTER | Facility: CLINIC | Age: 25
Discharge: HOME | End: 2025-03-17
Payer: COMMERCIAL

## 2025-03-17 VITALS
TEMPERATURE: 96.8 F | RESPIRATION RATE: 18 BRPM | HEART RATE: 84 BPM | SYSTOLIC BLOOD PRESSURE: 115 MMHG | DIASTOLIC BLOOD PRESSURE: 54 MMHG

## 2025-03-17 DIAGNOSIS — O99.013 ANEMIA DURING PREGNANCY IN THIRD TRIMESTER: Primary | ICD-10-CM

## 2025-03-17 PROCEDURE — 96365 THER/PROPH/DIAG IV INF INIT: CPT

## 2025-03-17 RX ORDER — SODIUM CHLORIDE 9 MG/ML
20 INJECTION, SOLUTION INTRAVENOUS ONCE
Status: CANCELLED | OUTPATIENT
Start: 2025-03-21

## 2025-03-17 RX ORDER — SODIUM CHLORIDE 9 MG/ML
20 INJECTION, SOLUTION INTRAVENOUS ONCE
Status: COMPLETED | OUTPATIENT
Start: 2025-03-17 | End: 2025-03-17

## 2025-03-17 RX ADMIN — IRON SUCROSE 200 MG: 20 INJECTION, SOLUTION INTRAVENOUS at 14:53

## 2025-03-17 RX ADMIN — SODIUM CHLORIDE 20 ML/HR: 9 INJECTION, SOLUTION INTRAVENOUS at 14:52

## 2025-03-17 NOTE — PROGRESS NOTES
Pt here venofer, offering no complaints.  Right AC IV placed with positive blood return noted.  Tolerated infusion without incident.  PIV removed.  AVS declined.  Next appt is 3/21 @ 11 am.  Walked out in stable condition.

## 2025-03-21 ENCOUNTER — HOSPITAL ENCOUNTER (OUTPATIENT)
Dept: INFUSION CENTER | Facility: CLINIC | Age: 25
Discharge: HOME | End: 2025-03-21
Payer: COMMERCIAL

## 2025-03-21 VITALS
RESPIRATION RATE: 18 BRPM | SYSTOLIC BLOOD PRESSURE: 118 MMHG | TEMPERATURE: 97.2 F | HEART RATE: 79 BPM | DIASTOLIC BLOOD PRESSURE: 54 MMHG

## 2025-03-21 DIAGNOSIS — O99.013 ANEMIA DURING PREGNANCY IN THIRD TRIMESTER: Primary | ICD-10-CM

## 2025-03-21 PROCEDURE — 96365 THER/PROPH/DIAG IV INF INIT: CPT

## 2025-03-21 RX ORDER — SODIUM CHLORIDE 9 MG/ML
20 INJECTION, SOLUTION INTRAVENOUS ONCE
Status: COMPLETED | OUTPATIENT
Start: 2025-03-21 | End: 2025-03-21

## 2025-03-21 RX ORDER — SODIUM CHLORIDE 9 MG/ML
20 INJECTION, SOLUTION INTRAVENOUS ONCE
Status: CANCELLED | OUTPATIENT
Start: 2025-03-25

## 2025-03-21 RX ADMIN — SODIUM CHLORIDE 20 ML/HR: 9 INJECTION, SOLUTION INTRAVENOUS at 11:21

## 2025-03-21 RX ADMIN — IRON SUCROSE 200 MG: 20 INJECTION, SOLUTION INTRAVENOUS at 11:21

## 2025-03-21 NOTE — PROGRESS NOTES
Pt here today for an iron infusion, offers no complaints, infusion completed w/o complications, aware of there next appt on 3.25 at 8 am, AVS declined and pt D/C home

## 2025-03-25 ENCOUNTER — HOSPITAL ENCOUNTER (OUTPATIENT)
Dept: INFUSION CENTER | Facility: CLINIC | Age: 25
Discharge: HOME | End: 2025-03-25
Payer: COMMERCIAL

## 2025-03-25 VITALS
TEMPERATURE: 96.9 F | HEART RATE: 92 BPM | SYSTOLIC BLOOD PRESSURE: 100 MMHG | DIASTOLIC BLOOD PRESSURE: 55 MMHG | RESPIRATION RATE: 16 BRPM

## 2025-03-25 DIAGNOSIS — O99.013 ANEMIA DURING PREGNANCY IN THIRD TRIMESTER: Primary | ICD-10-CM

## 2025-03-25 RX ORDER — SODIUM CHLORIDE 9 MG/ML
20 INJECTION, SOLUTION INTRAVENOUS ONCE
OUTPATIENT
Start: 2025-03-27

## 2025-03-25 RX ORDER — SODIUM CHLORIDE 9 MG/ML
20 INJECTION, SOLUTION INTRAVENOUS ONCE
Status: COMPLETED | OUTPATIENT
Start: 2025-03-25 | End: 2025-03-25

## 2025-03-25 RX ADMIN — SODIUM CHLORIDE 20 ML/HR: 9 INJECTION, SOLUTION INTRAVENOUS at 08:23

## 2025-03-25 RX ADMIN — IRON SUCROSE 200 MG: 20 INJECTION, SOLUTION INTRAVENOUS at 08:26

## 2025-03-25 NOTE — PROGRESS NOTES
Mybria De La TorreFlannery presents for Venofer, offers no complaints. PIV placed with positive blood return. Tolerated treatment well with no complications.      My Flannery is aware of future appt on 3/28 at 10:30 am. PIV removed.    AVS declined.

## 2025-03-26 ENCOUNTER — ROUTINE PRENATAL (OUTPATIENT)
Dept: OBGYN CLINIC | Facility: CLINIC | Age: 25
End: 2025-03-26
Payer: COMMERCIAL

## 2025-03-26 VITALS
BODY MASS INDEX: 30.55 KG/M2 | WEIGHT: 166 LBS | SYSTOLIC BLOOD PRESSURE: 110 MMHG | HEIGHT: 62 IN | DIASTOLIC BLOOD PRESSURE: 68 MMHG

## 2025-03-26 DIAGNOSIS — O26.879 SHORT CERVIX AFFECTING PREGNANCY: ICD-10-CM

## 2025-03-26 DIAGNOSIS — Z3A.34 34 WEEKS GESTATION OF PREGNANCY: Primary | ICD-10-CM

## 2025-03-26 DIAGNOSIS — O99.013 ANEMIA DURING PREGNANCY IN THIRD TRIMESTER: ICD-10-CM

## 2025-03-26 LAB
SL AMB  POCT GLUCOSE, UA: NORMAL
SL AMB POCT URINE PROTEIN: NORMAL

## 2025-03-26 PROCEDURE — 99213 OFFICE O/P EST LOW 20 MIN: CPT | Performed by: OBSTETRICS & GYNECOLOGY

## 2025-03-26 PROCEDURE — 81002 URINALYSIS NONAUTO W/O SCOPE: CPT | Performed by: OBSTETRICS & GYNECOLOGY

## 2025-03-26 NOTE — ASSESSMENT & PLAN NOTE
2.24 cm at 21 week scan. Started on vaginal progesterone 200 mg nightly till 36 weeks and 6 days.

## 2025-03-26 NOTE — PROGRESS NOTES
Short cervix affecting pregnancy  2.24 cm at 21 week scan. Started on vaginal progesterone 200 mg nightly till 36 weeks and 6 days.     Anemia during pregnancy in third trimester  Iron infusions scheduled due to no improvement in hemoglobin or iron after oral treatment.         34 weeks gestation of pregnancy  Signs of  labor and fetal kick counts discussed.     32 weeks scan: AGA, no further scans.     TWlbs        My Flannery is a 24 y.o. female     34w0d  Pap 10/17/2024.Neg HPV Neg   GC/CT:2024 Neg /Neg   PN1 Labs: 2024  Blood Type: A  Positive:   MFM Level 1:10/24/2024  MFM Level 2:2024  AFP: 2024  MT21 done on :10/24/2024  28 Week Labs:2025 Normal   TDap: declined   Flu: declined   GBS:   Blue Folder: given   Yellow Folder: given   Ped Referral :  Breast pump:  L&D forms: done   Delivery consent: done     Fetal movements :  YES   Any concerns at today's visit  BK , some contractions at night ,  vaginal Pressure upper back pain .

## 2025-03-26 NOTE — ASSESSMENT & PLAN NOTE
Signs of  labor and fetal kick counts discussed.     32 weeks scan: AGA, no further scans.     TWlbs

## 2025-04-03 ENCOUNTER — HOSPITAL ENCOUNTER (OUTPATIENT)
Dept: INFUSION CENTER | Facility: CLINIC | Age: 25
End: 2025-04-03

## 2025-04-08 PROBLEM — Z3A.36 36 WEEKS GESTATION OF PREGNANCY: Status: ACTIVE | Noted: 2025-03-03

## 2025-04-08 NOTE — PROGRESS NOTES
My Flannery is a 24 y.o. female     35w6d  Pap 10/17/2024. ASC-US HPV Neg GC/CT:10/17/2024 neg /Neg   PN1 Labs: 2024  Blood Type: A  Positive   MFM Level 1:10/24/2024  MFM Level 2:2024  AFP: 2024  28 Week Labs:2025  TDap: decline   Flu: decline   GBS: collect today   Blue Folder: given   Yellow Folder: given   Breast pump:  order   L&D forms: sign   Delivery consent: sign     Fetal movements: YES   Any lost of fluids :  NONE   Any contractions:  YES  and vaginal pressure lower back pain .

## 2025-04-08 NOTE — ASSESSMENT & PLAN NOTE
My Flannery is a 24 y.o.   36w0d who presents for routine PNV.  28 week labs reviewed: NML  TWG 9.072 kg (20 lb)   Good fetal movement. Denies contractions, cramping, leakage of fluid or vaginal bleeding.   Tdap vaccine declined  Reviewed  labor precautions and FKCs.   Advised to start Perineal massage at 34-36 weeks   Pregnancy Essential guide and Baby and Me web site recommended      Scheduled procedure with Patient at      Telephone Information:   Mobile [phone]      Scheduled Via: Case Request Order for  [facility]   If non-ambulatory location, select reason: Not applicable  Did patient request a specific facility other than MD's preferred facility? n/a; If so, which facility?   Procedure date: 09-13   Procedure time: NA ; Did patient request this specific time? n/a  Rep Contacted?: n/a  Notified patient about receiving an animated Christina program? Yes    The following have been confirmed:  Insurance name confirmed as MARK, will be the same at time of procedure?: Yes Ins Accepted at Facility? Yes  Latex Allergy No  Diabetic Yes  Sleep Apnea Yes  Diuretic/Water pill No  Defibrillator/Pacemaker No  MRSA hx No  Blood thinners: Coumadin (Warfarin) or Plavix No      Aspirin No      Phentermine (diet pill) No    Pre-Op testing required n/a, Patient informed NA  Prep required? n/a; Briefly reviewed? n/a; Prep cost range discussed? n/a  If procedure is scheduled 7 days or less, patient was told to  prep letter?: n/a

## 2025-04-08 NOTE — PATIENT INSTRUCTIONS
Patient Education     Pregnancy - The Eighth Month   About this topic   It is important for you to learn how to take care of yourself to help you have a healthy baby and safe delivery. It is good to have health care throughout your pregnancy.  The eighth month of your pregnancy starts around week 33 and lasts through week 36. By knowing how far along you are, you can learn what is normal for this stage of your pregnancy and plan for what is next.  General   Your body   During the eighth month of your pregnancy, here are some things you can expect.  You may:  Be feeling more tired. Rest as much as you can and take small naps if possible. This also helps with swollen feet and ankles.  Feel hot all the time. Be sure to drink plenty of water.  Notice your baby drops more into your pelvis. This makes breathing a bit easier, but you may have to go to the bathroom more often. You may also notice more problems with hemorrhoids.  Have more back pain  Notice clear jelly-like substance flecked with blood when you use the restroom. This is your mucus plug.  Feel less steady on your feet. This is because your hips and joints are preparing for birth.  Be tested for Group Beta Strep (GBS) to see if you will need antibiotics during labor  Gain about 1/2 to 1 pound (.23 to .45 kg) a week for the rest of your pregnancy. It is normal to gain about 5 to 10 pounds (2.3 to 4.5 kg) total in your first 4 months.  Have a BPP, or Biophysical Profile. The doctors do an ultrasound to check your baby’s health if you are at high risk or having problems.  Have a NST, or Non Stress Test. The staff place special monitors around your belly to check the baby’s heart rate and look for contractions.  Your baby's growth and development:  Your baby is almost fully mature but will spend the rest of the time inside of you getting bigger.  Their lungs are the last organ to mature, so it is important that your baby stays in your womb until your due date if  possible.  Their bones are getting stronger each day. The bones in their skull stay a little softer to make delivery easier.  They are able to scratch themselves as their fingernails are developed.  Your baby is becoming protected from germs as they develop antibodies.  They are moving a little less because there is less room.  Your baby is about 19 inches (48 cm) long and weighs about 6 pounds (2,700 gm). Your baby is about the size of a papaya or pineapple.  Things to Think About   Avoid alcohol, drugs, tobacco products, and second hand smoke.  Be sure you know the signs of labor and when to call your doctor.  Are you planning a natural childbirth or thinking about an epidural? Know things you can do to help cope with labor pain.  You may want to learn about cord blood banking.  Do you have everything you need for after the baby is born? Be sure the car seat fits in the car.  When do I need to call the doctor?   Contractions every 10 minutes or more often that do not go away with drinking water or position changes  Headache that does not go away; blurry vision; seeing spots or halos; increase in swelling in your hands, feet, or face; and pain under your ribs on the right side  Low, dull back pain that does not go away  Pressure in your pelvis that feels like your baby is pushing down  A gush or constant trickle of watery or bloody fluid leaking from your vagina  Little to no movement felt by baby in 2 hours. Your baby should be moving at least 10 times every 2 hours.  Vaginal bleeding with or without pain  Fever of 100.4°F (38°C) or higher  After a car accident, fall, or any trauma to your belly  Having thoughts of harming yourself or others, or do not feel safe at home  Last Reviewed Date   2020-05-06  Consumer Information Use and Disclaimer   This generalized information is a limited summary of diagnosis, treatment, and/or medication information. It is not meant to be comprehensive and should be used as a tool  "to help the user understand and/or assess potential diagnostic and treatment options. It does NOT include all information about conditions, treatments, medications, side effects, or risks that may apply to a specific patient. It is not intended to be medical advice or a substitute for the medical advice, diagnosis, or treatment of a health care provider based on the health care provider's examination and assessment of a patient’s specific and unique circumstances. Patients must speak with a health care provider for complete information about their health, medical questions, and treatment options, including any risks or benefits regarding use of medications. This information does not endorse any treatments or medications as safe, effective, or approved for treating a specific patient. UpToDate, Inc. and its affiliates disclaim any warranty or liability relating to this information or the use thereof. The use of this information is governed by the Terms of Use, available at https://www.[employer]Sync.ME/en/know/clinical-effectiveness-terms   Copyright   Copyright ©  UpToDate, Inc. and its affiliates and/or licensors. All rights reserved.  Patient Education     Depression during and after pregnancy   The Basics   Written by the doctors and editors at [employer]   What is depression? -- This is a disorder that makes you sad, but it is different than normal sadness.  For many people, pregnancy is a happy time. But some people have depression while they are pregnant or after they have their baby. Doctors use different terms for this:    depression - This is depression during pregnancy. \"\" means the period of time before a baby is born.   Postpartum depression - This is a kind of depression that some people get after having a baby. \"Postpartum\" means the period of time shortly after giving birth.  Depression can make it hard to eat, sleep, work, or enjoy life. It can also make it hard to care for " "yourself and your baby or other children.  What causes depression? -- It is caused by problems with chemicals in the brain called \"neurotransmitters.\" Some people might be more likely to have depression if it runs in their family. Other things might also play a role, including hormones, certain health problems, medicines, stress, being mistreated as a child, family problems, and problems with friends or at work.  It's not exactly clear what causes some people to have depression during or after pregnancy. It is more likely in people who had depression in the past.  Get help right away if you are thinking of hurting or killing yourself! -- Sometimes, people with depression think of hurting or killing themselves. If you ever feel like you might hurt yourself or your baby, help is available:   In the , contact the [employer] Suicide & Crisis Lifeline:   To speak to someone, call or text [employer].   To talk to someone online, go to www.[employer].org/chat.   Call your doctor or nurse, and tell them that it is an emergency.   Call for an ambulance (in the [place] and [place], call ).   Go to the emergency department at your local hospital.  If you think that your partner might have depression, or if you are worried that they might hurt themselves, get them help right away.  What are the symptoms of  depression? -- With  depression, symptoms start during pregnancy.  Depression can make you feel sad, down, hopeless, or cranky most of the day, almost every day. Another common sign of depression is no longer enjoying or caring about things that you used to like. Other symptoms might include trouble sleeping, not having the energy for normal daily tasks, not gaining enough weight, or feeling restless, worthless, or guilty.  What are the symptoms of postpartum depression? -- It can be hard to tell if someone has postpartum depression, since some of the symptoms might also be caused by the stress of taking care of a " ". For example, after having a new baby, it's normal to:   Sleep too much or too little   Feel tired or lack energy   Have changes in your appetite, weight, or desire to have sex  But a person with postpartum depression might not be able to sleep even when their baby sleeps. Or they might have so little energy that they cannot get out of bed for hours.  They might also feel:   Anxious, irritable, and angry   Guilty or overwhelmed   Unable to care for their baby   Like a failure as a parent  What are \"postpartum blues\"? -- After having a baby, many people get a mild type of postpartum depression called \"postpartum blues.\" Within 2 or 3 days after giving birth, people with postpartum blues might:   Be richard, irritable, or anxious   Have trouble concentrating or sleeping   Have crying spells  With postpartum blues, these symptoms are not severe and usually go away within 2 weeks. But in people with postpartum depression, the symptoms are more severe. They last at least 2 weeks, and often longer.  How is depression treated? -- The 2 main treatments are:   Medicines - Medicines called \"antidepressants\" are often used to treat depression.  A newer medicine called zuranolone (brand name: Zurzuvae) might also be an option for treating postpartum depression in some situations. It comes as a pill you take every day for 2 weeks. It is not used during pregnancy.   Psychotherapy (counseling) - This involves meeting with a therapist to talk about your feelings, thoughts, and behavior. There are different types of psychotherapy. For example, one type involves working on improving your relationships. Another type involves changing your thinking and behavior patterns to help you cope.  The options for treatment depend on how severe your symptoms are and whether you had depression before pregnancy. If you need treatment with medicine, your doctor will help you decide what to do based on your situation:   If you are pregnant, " your doctor will talk to you about your options. Many people take antidepressant medicines during pregnancy.   If you are breastfeeding, you might need to avoid certain medicines. That's because small amounts of medicine can get into your breast milk. For some medicines, this can be unhealthy for your baby. But not treating depression can also be harmful for both you and your baby, and there are many medicines for depression that do not seem to harm babies. Your doctor can help you decide if you need medicine and the best one to take.  Take care of yourself by getting plenty of rest and finding healthy ways to cope with stress. Accept help from other people when possible. It can also help to move your body. Even gentle forms of exercise, like walking, are good for your health.  Is there any way to prevent depression during or after pregnancy? -- Maybe. If you have had depression before, you are more likely to get it again during or after pregnancy. If you are at risk for depression, your doctor might recommend talking to a therapist.  If you had postpartum depression before and took a medicine that helped, your doctor might prescribe that medicine to take again after the next time you give birth.  All topics are updated as new evidence becomes available and our peer review process is complete.  This topic retrieved from "[employer] on: Apr 03, 2024.  Topic 12644 Version 18.0  Release: 32.2.4 - C32.92  © 2024 UpToDate, Inc. and/or its affiliates. All rights reserved.  Consumer Information Use and Disclaimer   Disclaimer: This generalized information is a limited summary of diagnosis, treatment, and/or medication information. It is not meant to be comprehensive and should be used as a tool to help the user understand and/or assess potential diagnostic and treatment options. It does NOT include all information about conditions, treatments, medications, side effects, or risks that may apply to a specific patient. It is not  "intended to be medical advice or a substitute for the medical advice, diagnosis, or treatment of a health care provider based on the health care provider's examination and assessment of a patient's specific and unique circumstances. Patients must speak with a health care provider for complete information about their health, medical questions, and treatment options, including any risks or benefits regarding use of medications. This information does not endorse any treatments or medications as safe, effective, or approved for treating a specific patient. UpToDate, Inc. and its affiliates disclaim any warranty or liability relating to this information or the use thereof.The use of this information is governed by the Terms of Use, available at https://www.[employer].com/en/know/clinical-effectiveness-terms. 2024© UpToDate, Inc. and its affiliates and/or licensors. All rights reserved.  Copyright   © 2024 UpToDate, Inc. and/or its affiliates. All rights reserved.  Patient Education     Your baby's movement before birth   The Basics   Written by the doctors and editors at [employer]   When should I start feeling my baby move? -- It depends. Most people first feel their baby moving in the uterus between about 16 and 20 weeks of pregnancy. It might take longer to feel movement if this is your first pregnancy or if the placenta is in the front of your uterus.  What kinds of movements should I feel? -- When you first feel your baby move, it might feel like a gentle flutter in your belly. This is sometimes called \"quickening.\" As the baby grows, their movements will get stronger. You will probably feel them kicking, rolling, and stretching. Later in pregnancy, you might be able to see and feel the baby moving from the outside.  You might notice that your baby is more active at certain times of the day or night. Even before birth, babies have periods of being asleep and awake. When your baby is sleeping, you might notice that " "they do not move as much.  Should I keep track of my baby's movements? -- If your pregnancy is healthy, you probably do not need to count or record your baby's movements. Feeling regular movement is a good sign that the baby is doing well.  In some cases, your doctor or midwife might ask you to keep track of your baby's movements. If so, they will tell you how to do this and when to call them.  A change in your baby's movements does not always mean that there is a problem. But in some cases, it can be a sign that the baby is having trouble. If your doctor or midwife is concerned, they can do tests to check on the baby.  If I am asked to track movement, how should I do it? -- There are different ways of tracking your baby's movement. This is sometimes called \"kick counting.\"  Your doctor or midwife will tell you exactly what to track. For example, they might ask you to write down:   How long it takes to feel 10 kicks or movements   How many times your baby moves in 1 hour  Many experts consider at least 10 movements in 2 hours to be a sign that the baby is doing well. But there is no specific cutoff for exactly how much movement is healthy or unhealthy. Some babies are more active than others, and some pregnant people feel movement more easily than others. The main goal of kick counting is to get to know your baby's normal patterns so you can tell if anything changes.  If you are doing kick counting:   Choose a time of day when your baby is usually active.   Find a quiet place where you will not be distracted.   Lie down on your side in a comfortable position.   Check the clock, or set a timer.   Each time you feel your baby move or kick, write down the time. Some people use a smartphone ajay to keep track.   If your baby seems less active than usual, try moving around, eating a snack, and emptying your bladder. This can help wake the baby up if they are asleep.   Stop counting after you have felt 10 kicks, or after " the length of time your doctor or midwife told you.  When should I call the doctor? -- Call your doctor or midwife for advice if:   You have concerns about your baby's movement.   Your baby is moving less than they normally do.   You notice a sudden change in the pattern of your baby's movements.   You have any other symptoms that worry you.  All topics are updated as new evidence becomes available and our peer review process is complete.  This topic retrieved from [employer] on: Feb 26, 2024.  Topic 764822 Version 1.0  Release: 32.2.4 - C32.56  © 2024 UpToDate, Inc. and/or its affiliates. All rights reserved.  Consumer Information Use and Disclaimer   Disclaimer: This generalized information is a limited summary of diagnosis, treatment, and/or medication information. It is not meant to be comprehensive and should be used as a tool to help the user understand and/or assess potential diagnostic and treatment options. It does NOT include all information about conditions, treatments, medications, side effects, or risks that may apply to a specific patient. It is not intended to be medical advice or a substitute for the medical advice, diagnosis, or treatment of a health care provider based on the health care provider's examination and assessment of a patient's specific and unique circumstances. Patients must speak with a health care provider for complete information about their health, medical questions, and treatment options, including any risks or benefits regarding use of medications. This information does not endorse any treatments or medications as safe, effective, or approved for treating a specific patient. UpToDate, Inc. and its affiliates disclaim any warranty or liability relating to this information or the use thereof.The use of this information is governed by the Terms of Use, available at https://www.w[employer]tersAdv[employer] Onlyuwer.com/en/know/clinical-effectiveness-terms. 2024© UpToDate, Inc. and its affiliates and/or  licensors. All rights reserved.  Copyright   © 2024 UpToDate, Inc. and/or its affiliates. All rights reserved.      Perineal Massage    Perineal massage is recommended starting after 34 weeks in order to reduce risks of perineal tearing during childbirth.  You have been provided and instructional sheet in your yellow 28 week prenatal packet.

## 2025-04-09 ENCOUNTER — ROUTINE PRENATAL (OUTPATIENT)
Dept: OBGYN CLINIC | Facility: CLINIC | Age: 25
End: 2025-04-09
Payer: COMMERCIAL

## 2025-04-09 VITALS — HEIGHT: 62 IN | BODY MASS INDEX: 30.91 KG/M2 | WEIGHT: 168 LBS

## 2025-04-09 DIAGNOSIS — R89.4 POSITIVE TEST FOR HERPES SIMPLEX VIRUS ANTIBODY: ICD-10-CM

## 2025-04-09 DIAGNOSIS — Z3A.36 36 WEEKS GESTATION OF PREGNANCY: Primary | ICD-10-CM

## 2025-04-09 DIAGNOSIS — O26.879 SHORT CERVIX AFFECTING PREGNANCY: ICD-10-CM

## 2025-04-09 LAB
SL AMB  POCT GLUCOSE, UA: NORMAL
SL AMB POCT URINE PROTEIN: NORMAL

## 2025-04-09 PROCEDURE — 81002 URINALYSIS NONAUTO W/O SCOPE: CPT | Performed by: OBSTETRICS & GYNECOLOGY

## 2025-04-09 PROCEDURE — 99213 OFFICE O/P EST LOW 20 MIN: CPT | Performed by: OBSTETRICS & GYNECOLOGY

## 2025-04-09 PROCEDURE — 87150 DNA/RNA AMPLIFIED PROBE: CPT | Performed by: OBSTETRICS & GYNECOLOGY

## 2025-04-09 NOTE — PROGRESS NOTES
Problem   Positive Test for Herpes Simplex Virus Antibody    HSV 1 antibody positive, no history of genital ulcers  HSV 2 negative.     No suppression needed.      36 Weeks Gestation of Pregnancy   Short Cervix Affecting Pregnancy    2.24 cm at 21 week scan.  Started on vaginal progesterone 200 mg nightly till 36 weeks and 6 days.        36 weeks gestation of pregnancy  My Flannery is a 24 y.o.   36w0d who presents for routine PNV.  28 week labs reviewed: NML  TWG 9.072 kg (20 lb)   Good fetal movement. Denies contractions, cramping, leakage of fluid or vaginal bleeding.   Tdap vaccine declined  Reviewed  labor precautions and FKCs.   Advised to start Perineal massage at 34-36 weeks   Pregnancy Essential guide and Baby and Me web site recommended       Positive test for herpes simplex virus antibody  Pt does not need suppression per Dr WOODS    Short cervix affecting pregnancy  Stopped progesterone today

## 2025-04-10 ENCOUNTER — NURSE TRIAGE (OUTPATIENT)
Age: 25
End: 2025-04-10

## 2025-04-10 NOTE — TELEPHONE ENCOUNTER
"Pt calling in she's 36w1d  c/o having right lower abdominal pain stabbing pain 10/10. Pts phone call was disconnected, Placed call to patient, no answer, left a non detailed voice message to call back with phone number provided [phone]    Answer Assessment - Initial Assessment Questions  1. LOCATION: \"Where does it hurt?\"       Sharp pain on lower right side   2. RADIATION: \"Does the pain shoot anywhere else?\" (e.g., chest, back)      Pt denies   3. ONSET: \"When did the pain begin?\" (Minutes, hours or days ago)       Started last night   4. SUDDEN: \"Gradual or sudden onset?\"      Sudden   5. PATTERN: \"Does the pain come and go, or has it been constant since it started?\"       Constant   6. SEVERITY: \"How bad is the pain?\" \"What does it keep you from doing?\"  (e.g., Scale 1-10; mild, moderate, or severe)      10/10  7. RECURRENT SYMPTOM: \"Have you ever had this type of stomach pain before?\" If Yes, ask: \"When was the last time?\" and \"What happened that time?\"       *No Answer*  8. CAUSE: \"What do you think is causing the stomach pain?      *No Answer*  9. RELIEVING/AGGRAVATING FACTORS: \"What makes it better or worse?\" (e.g., antacids, bowel movement, movement)      *No Answer*  10. FETAL MOVEMENT: \"Has the baby's movement decreased or changed significantly from normal?\"        *No Answer*  11. OTHER SYMPTOMS: \"Do you have any other symptoms?\" (e.g., back pain, contractions, diarrhea, fever, headache, urination pain, vaginal bleeding, vaginal discharge, vomiting)        *No Answer*  12. PEPE: \"What date are you expecting to deliver?\"        *No Answer*    Protocols used: Pregnancy - Abdominal Pain Greater Than 20 Weeks EGA-Adult-[place]    "

## 2025-04-10 NOTE — TELEPHONE ENCOUNTER
"FOLLOW UP:   Epic Secure Chat sent to Dr Petersen- on call  Epic Secure Chat sent to Claribel Stratton RN Charge     REASON FOR CONVERSATION: Pregnancy Problem    SYMPTOMS: 10/10 abdominal pain in lower right side 36w1d     OTHER: Pt calling in she's 36w1d  c/o 10/10 right lower abdominal pain that started yesterday. Pts pain is constant. Pt reporting +FM.  Pt denies back pain, contractions, diarrhea, fever, headache, urination pain, vaginal bleeding, vaginal discharge, vomiting    Pt is advised to go to L&D now at [facility] for further evaluation, pt verbalized understanding, pt stating that she will arrive at 2pm. Pt advised again to leave now, pt verbalized understanding.       DISPOSITION:   GO to L&D NOW       Reason for Disposition   MODERATE-SEVERE abdominal pain (e.g., interferes with normal activities, awakens from sleep)    Answer Assessment - Initial Assessment Questions  1. LOCATION: \"Where does it hurt?\"       Lower right abdominal pain   2. RADIATION: \"Does the pain shoot anywhere else?\" (e.g., chest, back)      No   3. ONSET: \"When did the pain begin?\" (Minutes, hours or days ago)       Last night   4. SUDDEN: \"Gradual or sudden onset?\"      Sudden   5. PATTERN: \"Does the pain come and go, or has it been constant since it started?\"       Constant   6. SEVERITY: \"How bad is the pain?\" \"What does it keep you from doing?\"  (e.g., Scale 1-10; mild, moderate, or severe)      10/10 p[ain   7. RECURRENT SYMPTOM: \"Have you ever had this type of stomach pain before?\" If Yes, ask: \"When was the last time?\" and \"What happened that time?\"       Pt denies   8. CAUSE: \"What do you think is causing the stomach pain?      Pt denies   9. RELIEVING/AGGRAVATING FACTORS: \"What makes it better or worse?\" (e.g., antacids, bowel movement, movement)      Pt denies   10. FETAL MOVEMENT: \"Has the baby's movement decreased or changed significantly from normal?\"        Pt reporting +FM   11. OTHER SYMPTOMS: \"Do you " "have any other symptoms?\" (e.g., back pain, contractions, diarrhea, fever, headache, urination pain, vaginal bleeding, vaginal discharge, vomiting)        Pt denies back pain, contractions, diarrhea, fever, headache, urination pain, vaginal bleeding, vaginal discharge, vomiting  12. PEPE: \"What date are you expecting to deliver?\"        5/7/25    Protocols used: Pregnancy - Abdominal Pain Greater Than 20 Weeks EGA-Adult-[place]    "

## 2025-04-11 ENCOUNTER — RESULTS FOLLOW-UP (OUTPATIENT)
Dept: OBGYN CLINIC | Facility: CLINIC | Age: 25
End: 2025-04-11

## 2025-04-11 LAB — GP B STREP DNA SPEC QL NAA+PROBE: NEGATIVE

## 2025-04-14 NOTE — PROGRESS NOTES
Name: My Flannery      : 2000      MRN: [id number]  Encounter Provider: Emeli Hart PA-C  Encounter Date: 2025   Encounter department: [facility]  :  Assessment & Plan  37 weeks gestation of pregnancy  Patient is a 24 y.o.  at 37w0d gestation presenting for a routine prenatal visit  -Patient is doing well today   -Denies VB, LOF. Reports contractions and pelvic pressure/abdominal pain. Recommended belly pain, tylenol, and increase in fluids.   -Good FM   -FKC reviewed  -Labor precautions reviewed  -Continue PNV   -AFP   -Delivery Consent signed   -Birth plan signed  -Contraception: desires post-placental IUD   -IOL discussed with patient today. Discussed ARRIVE trial and option for eIOL at 39 wks. Will send request to OB navigators at 38 weeks   Induction of labor:  Discussed indication for induction such as elective (> or equal to 39 weeks), medical/obstetric indications and alternatives which is to await spontaneous labor.  Discussed cervical ripening if cervix is unfavorable with prostaglandin (vaginal misoprostol) or mechanical dilation with insertion of balloon catheter.  Discussed that when electively induced nulliparous or multiparous women are compared with expectantly managed women, there is no evidence that elective induction is associated with increased risk of  delivery.  Discussed cervical ripening if cervix is unfavorable with prostaglandin (vaginal misoprostol) or mechanical dilation with insertion of balloon catheter.    -Reviewed the reasons to call - namely vaginal bleeding, LOF, Decreased FM, severe nausea and vomiting, severe pelvic pain, fevers or pain/burning with urination.    -Return to the office in 1 weeks for routine prenatal visit  Orders:    POCT urine dip    Anemia during pregnancy in third trimester  -Last hgb 3/11 9.2  -She finished infusions   -Repeat CBC and ferritin ordered to check efficacy of  infusions   Orders:    CBC and differential; Future    Ferritin; Future    Short cervix affecting pregnancy  -Patient stopped progesterone   - labor precautions discussed        Asthma affecting pregnancy in second trimester  -Controlled on current regimen        ________________________________________________    History of Present Illness   HPI  My Flannery is a 24 y.o.  37w0d here for PN visit.     OB Complaints:  Denies c/o n/v/ha, no edema, no smoking, no DV.   Denies vb/lof  Denies cramping/contractions or signs of PTL.    She reports right sided abdominal pain that started when she got here and pelvic pain. Denies nausea, vomiting, fevers, chills, anorexia. Reports an increased white discharge over the past week.     Established care with [facility].     Review of Systems   Constitutional: Negative.    Cardiovascular:  Negative for leg swelling.   Gastrointestinal:  Negative for abdominal pain, nausea and vomiting.   Genitourinary:  Positive for pelvic pain (bilateral) and vaginal discharge (white milky discharge). Negative for dysuria, frequency, hematuria, urgency, vaginal bleeding and vaginal pain.   Neurological:  Negative for headaches.   Psychiatric/Behavioral:  Negative for dysphoric mood, self-injury and suicidal ideas. The patient is not nervous/anxious.        Past Medical History:   Diagnosis Date    Anemia     with last pregnancy     Asthma     albuterol prn    Depression     intermittent   no meds    History of depression 2021    Varicella     had vaccines       Objective   /68 (BP Location: Right arm, Patient Position: Sitting, Cuff Size: Standard)   Wt 76.2 kg (168 lb)   LMP 2024 (Exact Date)   BMI 30.73 kg/m²        Pre- Vitals      Flowsheet Row Most Recent Value   Prenatal Assessment    Fetal Heart Rate 135   Fundal Height (cm) 37 cm   Movement Present   Prenatal Vitals    Blood Pressure 110/68   Weight - Scale 76.2 kg (168 lb)   Urine Albumin/Glucose     Dilation/Effacement/Station    Vaginal Drainage    Edema             Physical Exam  Constitutional:       Appearance: Normal appearance. She is well-developed and well-groomed.   HENT:      Head: Normocephalic and atraumatic.   Pulmonary:      Effort: Pulmonary effort is normal.   Abdominal:      General: Abdomen is protuberant.   Neurological:      Mental Status: She is alert.   Psychiatric:         Mood and Affect: Mood normal.         Behavior: Behavior normal. Behavior is cooperative.         Thought Content: Thought content normal.         Judgment: Judgment normal.      OB exam completed: fundal height, +FHT

## 2025-04-16 ENCOUNTER — ROUTINE PRENATAL (OUTPATIENT)
Dept: OBGYN CLINIC | Facility: CLINIC | Age: 25
End: 2025-04-16
Payer: COMMERCIAL

## 2025-04-16 VITALS — SYSTOLIC BLOOD PRESSURE: 110 MMHG | DIASTOLIC BLOOD PRESSURE: 68 MMHG | BODY MASS INDEX: 30.73 KG/M2 | WEIGHT: 168 LBS

## 2025-04-16 DIAGNOSIS — J45.909 ASTHMA AFFECTING PREGNANCY IN SECOND TRIMESTER: ICD-10-CM

## 2025-04-16 DIAGNOSIS — Z3A.37 37 WEEKS GESTATION OF PREGNANCY: Primary | ICD-10-CM

## 2025-04-16 DIAGNOSIS — O26.879 SHORT CERVIX AFFECTING PREGNANCY: ICD-10-CM

## 2025-04-16 DIAGNOSIS — O99.512 ASTHMA AFFECTING PREGNANCY IN SECOND TRIMESTER: ICD-10-CM

## 2025-04-16 DIAGNOSIS — O99.013 ANEMIA DURING PREGNANCY IN THIRD TRIMESTER: ICD-10-CM

## 2025-04-16 LAB
SL AMB  POCT GLUCOSE, UA: ABNORMAL
SL AMB POCT URINE PROTEIN: ABNORMAL

## 2025-04-16 PROCEDURE — 81002 URINALYSIS NONAUTO W/O SCOPE: CPT

## 2025-04-16 PROCEDURE — 99213 OFFICE O/P EST LOW 20 MIN: CPT

## 2025-04-16 NOTE — ASSESSMENT & PLAN NOTE
-Last hgb 3/11 9.2  -She finished infusions   -Repeat CBC and ferritin ordered to check efficacy of infusions   Orders:    CBC and differential; Future    Ferritin; Future

## 2025-04-16 NOTE — ASSESSMENT & PLAN NOTE
Patient is a 24 y.o.  at 37w0d gestation presenting for a routine prenatal visit  -Patient is doing well today   -Denies VB, LOF. Reports contractions and pelvic pressure/abdominal pain. Recommended belly pain, tylenol, and increase in fluids.   -Good FM   -FKC reviewed  -Labor precautions reviewed  -Continue PNV   -AFP   -Delivery Consent signed   -Birth plan signed  -Contraception: desires post-placental IUD   -IOL discussed with patient today. Discussed ARRIVE trial and option for eIOL at 39 wks. Will send request to OB navigators at 38 weeks   Induction of labor:  Discussed indication for induction such as elective (> or equal to 39 weeks), medical/obstetric indications and alternatives which is to await spontaneous labor.  Discussed cervical ripening if cervix is unfavorable with prostaglandin (vaginal misoprostol) or mechanical dilation with insertion of balloon catheter.  Discussed that when electively induced nulliparous or multiparous women are compared with expectantly managed women, there is no evidence that elective induction is associated with increased risk of  delivery.  Discussed cervical ripening if cervix is unfavorable with prostaglandin (vaginal misoprostol) or mechanical dilation with insertion of balloon catheter.    -Reviewed the reasons to call - namely vaginal bleeding, LOF, Decreased FM, severe nausea and vomiting, severe pelvic pain, fevers or pain/burning with urination.    -Return to the office in 1 weeks for routine prenatal visit  Orders:    POCT urine dip

## 2025-04-17 ENCOUNTER — NURSE TRIAGE (OUTPATIENT)
Age: 25
End: 2025-04-17

## 2025-04-17 NOTE — TELEPHONE ENCOUNTER
"FOLLOW UP: ESC to provider--Per provider patient to continue to monitor. If develops contractions or believes she's in labor to report to triage. Pressure worse in every pregnancy.     REASON FOR CONVERSATION: Pregnancy Problem    SYMPTOMS: [id number] of vaginal pressure and low back pain that suddenly worsened last night. Pain is worse with movement. Denies ctxns, LOF, vaginal bleeding. Endorses FM.     OTHER: Advised rest and tylenol.    DISPOSITION: No disposition on file.  Answer Assessment - Initial Assessment Questions  1. LOCATION: \"Where does it hurt?\"       vagina  2. RADIATION: \"Does the pain shoot anywhere else?\" (e.g., chest, back)      denies  3. ONSET: \"When did the pain begin?\" (Minutes, hours or days ago)       Worsened yesterday  4. SUDDEN: \"Gradual or sudden onset?\"      sudden  5. PATTERN: \"Does the pain come and go, or has it been constant since it started?\"       constant  6. SEVERITY: \"How bad is the pain?\" \"What does it keep you from doing?\"  (e.g., Scale 1-10; mild, moderate, or severe)      10/10  7. RECURRENT SYMPTOM: \"Have you ever had this type of stomach pain before?\" If Yes, ask: \"When was the last time?\" and \"What happened that time?\"       Yes--ongoing  8. CAUSE: \"What do you think is causing the stomach pain?      unsure  9. RELIEVING/AGGRAVATING FACTORS: \"What makes it better or worse?\" (e.g., antacids, bowel movement, movement)      Worse with movement  10. FETAL MOVEMENT: \"Has the baby's movement decreased or changed significantly from normal?\"        Endorses normal FM  11. OTHER SYMPTOMS: \"Do you have any other symptoms?\" (e.g., back pain, contractions, diarrhea, fever, headache, urination pain, vaginal bleeding, vaginal discharge, vomiting)        Vaginal pressure and low back pain  12. PEPE: \"What date are you expecting to deliver?\"            Protocols used: Pregnancy - Abdominal Pain Greater Than 20 Weeks EGA-Adult-[place]    "

## 2025-04-17 NOTE — TELEPHONE ENCOUNTER
Pt calling in and was notified of the providers recommendations, pt verbalized understanding, no further questions

## 2025-04-22 ENCOUNTER — NURSE TRIAGE (OUTPATIENT)
Dept: OTHER | Facility: OTHER | Age: 25
End: 2025-04-22

## 2025-04-23 ENCOUNTER — ROUTINE PRENATAL (OUTPATIENT)
Dept: OBGYN CLINIC | Facility: CLINIC | Age: 25
End: 2025-04-23
Payer: COMMERCIAL

## 2025-04-23 ENCOUNTER — TELEPHONE (OUTPATIENT)
Dept: OBGYN CLINIC | Facility: CLINIC | Age: 25
End: 2025-04-23

## 2025-04-23 VITALS — DIASTOLIC BLOOD PRESSURE: 60 MMHG | SYSTOLIC BLOOD PRESSURE: 104 MMHG | WEIGHT: 167.8 LBS | BODY MASS INDEX: 30.69 KG/M2

## 2025-04-23 DIAGNOSIS — Z34.83 PRENATAL CARE, SUBSEQUENT PREGNANCY IN THIRD TRIMESTER: Primary | ICD-10-CM

## 2025-04-23 DIAGNOSIS — Z3A.38 38 WEEKS GESTATION OF PREGNANCY: ICD-10-CM

## 2025-04-23 LAB
SL AMB  POCT GLUCOSE, UA: NORMAL
SL AMB POCT URINE PROTEIN: NORMAL

## 2025-04-23 PROCEDURE — 99213 OFFICE O/P EST LOW 20 MIN: CPT | Performed by: OBSTETRICS & GYNECOLOGY

## 2025-04-23 PROCEDURE — 81002 URINALYSIS NONAUTO W/O SCOPE: CPT | Performed by: OBSTETRICS & GYNECOLOGY

## 2025-04-23 NOTE — Clinical Note
Procedure to be scheduled  Induction PEPE: Estimated Date of Delivery: 5/7/25 Indication for delivery: elective Requested date (s) of delivery: 39 weeks after 4/30/2025 If requested date is unavailable, is there a date by which the pt must be delivered? Physician preference: n/a If IOL, anticipated method: pit/arom

## 2025-04-23 NOTE — TELEPHONE ENCOUNTER
"FOLLOW UP: patient requesting membrane sweep for visit tomorrow    REASON FOR CONVERSATION: Laboring    SYMPTOMS: Calling with contractions q3-5 x1 hour, causing her left leg to cramp up with each contraction. Good fetal movement, no LOF, no bleeding. +pelvic pressure.     OTHER: per on call if patient feels it is labor she can come in to triage. Relayed to patient who verbalized understanding and will monitor at home for now- call back precautions for contractions intensifying, more frequent, persistent at same interval, decreased fetal movement, LoF, bleeding or other concern.     DISPOSITION: Go to LD Now  Reason for Disposition   [1] History of prior delivery (multipara) AND [2] contractions < 10 minutes apart AND [3] present 1 hour    Answer Assessment - Initial Assessment Questions  1. ONSET: \"When did the symptoms begin?\"         1 hour ago    2. CONTRACTIONS: \"Describe the contractions that you are having.\" (e.g., duration, frequency, regularity, severity)      3-5 minutes x1 hour    3. PREGNANCY: \"How many weeks pregnant are you?\"      37w3d    4. PEPE: \"What date are you expecting to deliver?\"      5/7/25    5. PARITY: \"Have you had a baby before?\" If Yes, ask: \"How long did the labor last?\"      Normal movement    6. FETAL MOVEMENT: \"Has the baby's movement decreased or changed significantly from normal?\"      Normal    7. OTHER SYMPTOMS: \"Do you have any other symptoms?\" (e.g., leaking fluid from vagina, vaginal bleeding, fever, hand/facial swelling)      No bleeding no lof, +pelvic pressure    Protocols used: Pregnancy - Labor-Adult-AH    "

## 2025-04-23 NOTE — ASSESSMENT & PLAN NOTE
Interested in elective induction  3/70/-2 mid soft  Pit/AROM to be scheduled    Signs of labor and fetal kick count discussed.

## 2025-04-23 NOTE — TELEPHONE ENCOUNTER
"Regardin weeks pregnant/contractions/ questions  ----- Message from Loren VILLANUEVA sent at 2025  9:55 PM EDT -----  Patient stated, \"I'm currently having contractions. Additionally, I have an appointment tomorrow to schedule my inductions for next week, but would like to inquire if a membrane sweep can also be done.\"    "

## 2025-04-23 NOTE — TELEPHONE ENCOUNTER
----- Message from Татьяна Wakefield MD sent at 4/23/2025  3:48 PM EDT -----  Procedure to be scheduled  Induction  PEPE: Estimated Date of Delivery: 5/7/25  Indication for delivery: elective  Requested date (s) of delivery: 39 weeks after 4/30/2025  If requested date is unavailable, is there a date by which the pt must be delivered?  Physician preference: n/a  If IOL, anticipated method: pit/arom

## 2025-04-23 NOTE — PROGRESS NOTES
Name: My Flannery      : 2000      MRN: [id number]  Encounter Provider: Татьяна Wakefield MD  Encounter Date: 2025   Encounter department: [facility] [place]  :  Assessment & Plan  Prenatal care, subsequent pregnancy in third trimester    Orders:    POCT urine dip    38 weeks gestation of pregnancy  Interested in elective induction  3/70/- mid soft  Pit/AROM to be scheduled    Signs of labor and fetal kick count discussed.              History of Present Illness   HPI  My Flannery is a 24 y.o. female who presents  for prenatal ob visit today.    GA: 38w0d  PEPE: 25    Denies LOF, VB  Some contractions yesterday  +FM    Urine: Protein neg / Glucose neg  Labs utd    Blue folder given at PN1  Yellow folder given   Delivery consent signed  -induction consent signed  Birth plan returned  Breast pump ordered  Tdap defer  Rhogam not needed. A+  GBS negative    Requests membrane sweep today          Review of Systems       Objective   /60 (BP Location: Left arm, Patient Position: Sitting, Cuff Size: Standard)   Wt 76.1 kg (167 lb 12.8 oz)   LMP 2024 (Exact Date)   BMI 30.69 kg/m²      Physical Exam

## 2025-04-23 NOTE — TELEPHONE ENCOUNTER
Patient given recommendations by RN and on call provider. Patient will be monitoring at home. Labor precautions were reviewed.

## 2025-04-25 ENCOUNTER — NURSE TRIAGE (OUTPATIENT)
Age: 25
End: 2025-04-25

## 2025-04-25 ENCOUNTER — NURSE TRIAGE (OUTPATIENT)
Dept: OTHER | Facility: OTHER | Age: 25
End: 2025-04-25

## 2025-04-25 NOTE — TELEPHONE ENCOUNTER
Patient calling to talk with Juliet Guadarrama RN Navigator for IOL date.   Phone call to office s/w Juliet Daniels will call patient back (currently with patient)  Patient verbalzied understanding and thankful for call back today.  HP inbasket msg sent

## 2025-04-25 NOTE — TELEPHONE ENCOUNTER
No availability for IOL from 4/30-5/8 unable to schedule yet as this is longer than the 7 day window allowed. Will have to resend on Monday.

## 2025-04-25 NOTE — TELEPHONE ENCOUNTER
"FOLLOW UP: n/a    REASON FOR CONVERSATION: Pregnancy Problem    SYMPTOMS: Vaginal pressure    OTHER: 38w2d--Patient calling to request another membrane sweep to try to induce labor. Complains of vaginal pressure since appt this past Wednesday. Denies ctxns, LOF, vaginal bleeding. Endorses fetal movement. Advised no appts. Currently available prior to already scheduled appt. Advised to call Monday to try and move appt. Up. Encouraged rest and increased hydration. Reviewed can try walking, nipple stimulation or intercourse to try and induce labor naturally, but ultimately baby will come when ready. Patient verbalizes understanding.     DISPOSITION: No disposition on file.  Reason for Disposition   Slight spotting after sexual intercourse or pelvic exam    Answer Assessment - Initial Assessment Questions  1. ONSET: \"When did the symptoms begin?\"         ongoing  2. CONTRACTIONS: \"Describe the contractions that you are having.\" (e.g., duration, frequency, regularity, severity)      denies  3. PREGNANCY: \"How many weeks pregnant are you?\"      38w2d  4. PEPE: \"What date are you expecting to deliver?\"      25  5. PARITY: \"Have you had a baby before?\" If Yes, ask: \"How long did the labor last?\"        6. FETAL MOVEMENT: \"Has the baby's movement decreased or changed significantly from normal?\"      Endorses fetal movement, no concerns  7. OTHER SYMPTOMS: \"Do you have any other symptoms?\" (e.g., leaking fluid from vagina, vaginal bleeding, fever, hand/facial swelling)      Vaginal pressure    Protocols used: Pregnancy - Labor-Adult-[place]    "

## 2025-04-26 ENCOUNTER — NURSE TRIAGE (OUTPATIENT)
Dept: OTHER | Facility: OTHER | Age: 25
End: 2025-04-26

## 2025-04-26 ENCOUNTER — HOSPITAL ENCOUNTER (INPATIENT)
Facility: HOSPITAL | Age: 25
LOS: 2 days | Discharge: HOME | DRG: 542 | End: 2025-04-28
Attending: OBSTETRICS & GYNECOLOGY | Admitting: OBSTETRICS & GYNECOLOGY
Payer: COMMERCIAL

## 2025-04-26 DIAGNOSIS — Z3A.38 38 WEEKS GESTATION OF PREGNANCY: ICD-10-CM

## 2025-04-26 PROBLEM — O47.9 UTERINE CONTRACTIONS: Status: ACTIVE | Noted: 2025-04-26

## 2025-04-26 PROBLEM — Z37.9 NORMAL LABOR: Status: ACTIVE | Noted: 2025-04-26

## 2025-04-26 LAB
ABO GROUP BLD: NORMAL
BASE EXCESS BLDCOA CALC-SCNC: -4.2 MMOL/L (ref 3–11)
BASE EXCESS BLDCOV CALC-SCNC: -2.8 MMOL/L (ref 1–9)
BLD GP AB SCN SERPL QL: NEGATIVE
ERYTHROCYTE [DISTWIDTH] IN BLOOD BY AUTOMATED COUNT: 15 % (ref 11.6–15.1)
HCO3 BLDCOA-SCNC: 21.4 MMOL/L (ref 17.3–27.3)
HCO3 BLDCOV-SCNC: 19.6 MMOL/L (ref 12.2–28.6)
HCT VFR BLD AUTO: 34.7 % (ref 34.8–46.1)
HGB BLD-MCNC: 11.3 G/DL (ref 11.5–15.4)
HOLD SPECIMEN: NORMAL
MCH RBC QN AUTO: 30.9 PG (ref 26.8–34.3)
MCHC RBC AUTO-ENTMCNC: 32.6 G/DL (ref 31.4–37.4)
MCV RBC AUTO: 95 FL (ref 82–98)
O2 CT VFR BLDCOA CALC: 13.7 ML/DL
OXYHGB MFR BLDCOA: 57.8 %
OXYHGB MFR BLDCOV: 85.8 %
PCO2 BLDCOA: 41.4 MM[HG] (ref 30–60)
PCO2 BLDCOV: 29 MM HG (ref 27–43)
PH BLDCOA: 7.33 [PH] (ref 7.23–7.43)
PH BLDCOV: 7.45 [PH] (ref 7.19–7.49)
PLATELET # BLD AUTO: 171 THOUSANDS/UL (ref 149–390)
PMV BLD AUTO: 10.6 FL (ref 8.9–12.7)
PO2 BLDCOA: 23.6 MM HG (ref 5–25)
PO2 BLDCOV: 37.4 MM HG (ref 15–45)
RBC # BLD AUTO: 3.66 MILLION/UL (ref 3.81–5.12)
RH BLD: POSITIVE
SAO2 % BLDCOV: 19.8 ML/DL
SPECIMEN EXPIRATION DATE: NORMAL
WBC # BLD AUTO: 11.15 THOUSAND/UL (ref 4.31–10.16)

## 2025-04-26 PROCEDURE — 0W3R7ZZ CONTROL BLEEDING IN GENITOURINARY TRACT, VIA NATURAL OR ARTIFICIAL OPENING: ICD-10-PCS | Performed by: OBSTETRICS & GYNECOLOGY

## 2025-04-26 PROCEDURE — 82805 BLOOD GASES W/O2 SATURATION: CPT | Performed by: OBSTETRICS & GYNECOLOGY

## 2025-04-26 PROCEDURE — 4A1HXCZ MONITORING OF PRODUCTS OF CONCEPTION, CARDIAC RATE, EXTERNAL APPROACH: ICD-10-PCS | Performed by: OBSTETRICS & GYNECOLOGY

## 2025-04-26 PROCEDURE — 86850 RBC ANTIBODY SCREEN: CPT

## 2025-04-26 PROCEDURE — 86780 TREPONEMA PALLIDUM: CPT

## 2025-04-26 PROCEDURE — 0UQMXZZ REPAIR VULVA, EXTERNAL APPROACH: ICD-10-PCS | Performed by: OBSTETRICS & GYNECOLOGY

## 2025-04-26 PROCEDURE — 86900 BLOOD TYPING SEROLOGIC ABO: CPT

## 2025-04-26 PROCEDURE — 86901 BLOOD TYPING SEROLOGIC RH(D): CPT

## 2025-04-26 PROCEDURE — 85027 COMPLETE CBC AUTOMATED: CPT

## 2025-04-26 PROCEDURE — NC001 PR NO CHARGE: Performed by: OBSTETRICS & GYNECOLOGY

## 2025-04-26 PROCEDURE — 59409 OBSTETRICAL CARE: CPT | Performed by: OBSTETRICS & GYNECOLOGY

## 2025-04-26 PROCEDURE — 99214 OFFICE O/P EST MOD 30 MIN: CPT

## 2025-04-26 RX ORDER — ACETAMINOPHEN 325 MG/1
650 TABLET ORAL EVERY 4 HOURS PRN
Status: DISCONTINUED | OUTPATIENT
Start: 2025-04-26 | End: 2025-04-28 | Stop reason: HOSPADM

## 2025-04-26 RX ORDER — METHYLERGONOVINE MALEATE 0.2 MG/ML
INJECTION INTRAVENOUS
Status: COMPLETED
Start: 2025-04-26 | End: 2025-04-26

## 2025-04-26 RX ORDER — PHENAZOPYRIDINE HYDROCHLORIDE 100 MG/1
100 TABLET, FILM COATED ORAL
Status: DISCONTINUED | OUTPATIENT
Start: 2025-04-26 | End: 2025-04-28 | Stop reason: HOSPADM

## 2025-04-26 RX ORDER — IBUPROFEN 600 MG/1
600 TABLET, FILM COATED ORAL EVERY 6 HOURS
Status: DISCONTINUED | OUTPATIENT
Start: 2025-04-26 | End: 2025-04-28 | Stop reason: HOSPADM

## 2025-04-26 RX ORDER — CARBOPROST TROMETHAMINE 250 UG/ML
INJECTION, SOLUTION INTRAMUSCULAR
Status: DISPENSED
Start: 2025-04-26 | End: 2025-04-27

## 2025-04-26 RX ORDER — OXYTOCIN/RINGER'S LACTATE 30/500 ML
PLASTIC BAG, INJECTION (ML) INTRAVENOUS
Status: COMPLETED
Start: 2025-04-26 | End: 2025-04-26

## 2025-04-26 RX ORDER — FENTANYL CITRATE 50 UG/ML
50 INJECTION, SOLUTION INTRAMUSCULAR; INTRAVENOUS ONCE
Refills: 0 | Status: COMPLETED | OUTPATIENT
Start: 2025-04-26 | End: 2025-04-26

## 2025-04-26 RX ORDER — SODIUM CHLORIDE, SODIUM LACTATE, POTASSIUM CHLORIDE, CALCIUM CHLORIDE 600; 310; 30; 20 MG/100ML; MG/100ML; MG/100ML; MG/100ML
125 INJECTION, SOLUTION INTRAVENOUS CONTINUOUS
Status: DISCONTINUED | OUTPATIENT
Start: 2025-04-26 | End: 2025-04-26

## 2025-04-26 RX ORDER — OXYTOCIN/RINGER'S LACTATE 30/500 ML
62.5 PLASTIC BAG, INJECTION (ML) INTRAVENOUS
Status: DISCONTINUED | OUTPATIENT
Start: 2025-04-26 | End: 2025-04-28 | Stop reason: HOSPADM

## 2025-04-26 RX ORDER — ALBUTEROL SULFATE 90 UG/1
2 INHALANT RESPIRATORY (INHALATION) EVERY 4 HOURS PRN
Status: DISCONTINUED | OUTPATIENT
Start: 2025-04-26 | End: 2025-04-28 | Stop reason: HOSPADM

## 2025-04-26 RX ORDER — BENZOCAINE/MENTHOL 6 MG-10 MG
1 LOZENGE MUCOUS MEMBRANE DAILY PRN
Status: DISCONTINUED | OUTPATIENT
Start: 2025-04-26 | End: 2025-04-28 | Stop reason: HOSPADM

## 2025-04-26 RX ORDER — BUPIVACAINE HYDROCHLORIDE 2.5 MG/ML
30 INJECTION, SOLUTION EPIDURAL; INFILTRATION; INTRACAUDAL; PERINEURAL ONCE AS NEEDED
Status: DISCONTINUED | OUTPATIENT
Start: 2025-04-26 | End: 2025-04-26

## 2025-04-26 RX ORDER — FENTANYL CITRATE 50 UG/ML
INJECTION, SOLUTION INTRAMUSCULAR; INTRAVENOUS
Status: COMPLETED
Start: 2025-04-26 | End: 2025-04-26

## 2025-04-26 RX ORDER — SIMETHICONE 80 MG
80 TABLET,CHEWABLE ORAL 4 TIMES DAILY PRN
Status: DISCONTINUED | OUTPATIENT
Start: 2025-04-26 | End: 2025-04-28 | Stop reason: HOSPADM

## 2025-04-26 RX ORDER — DOCUSATE SODIUM 100 MG/1
100 CAPSULE, LIQUID FILLED ORAL 2 TIMES DAILY
Status: DISCONTINUED | OUTPATIENT
Start: 2025-04-26 | End: 2025-04-28 | Stop reason: HOSPADM

## 2025-04-26 RX ORDER — CALCIUM CARBONATE 500 MG/1
1000 TABLET, CHEWABLE ORAL DAILY PRN
Status: DISCONTINUED | OUTPATIENT
Start: 2025-04-26 | End: 2025-04-28 | Stop reason: HOSPADM

## 2025-04-26 RX ORDER — TRANEXAMIC ACID 10 MG/ML
1000 INJECTION, SOLUTION INTRAVENOUS ONCE
Status: COMPLETED | OUTPATIENT
Start: 2025-04-26 | End: 2025-04-26

## 2025-04-26 RX ORDER — ONDANSETRON 2 MG/ML
4 INJECTION INTRAMUSCULAR; INTRAVENOUS EVERY 8 HOURS PRN
Status: DISCONTINUED | OUTPATIENT
Start: 2025-04-26 | End: 2025-04-28 | Stop reason: HOSPADM

## 2025-04-26 RX ORDER — LORATADINE 10 MG/1
10 TABLET ORAL DAILY
Status: DISCONTINUED | OUTPATIENT
Start: 2025-04-27 | End: 2025-04-28 | Stop reason: HOSPADM

## 2025-04-26 RX ORDER — LIDOCAINE HYDROCHLORIDE 10 MG/ML
INJECTION, SOLUTION EPIDURAL; INFILTRATION; INTRACAUDAL; PERINEURAL
Status: COMPLETED
Start: 2025-04-26 | End: 2025-04-26

## 2025-04-26 RX ORDER — METHYLERGONOVINE MALEATE 0.2 MG/ML
0.2 INJECTION INTRAVENOUS ONCE
Status: COMPLETED | OUTPATIENT
Start: 2025-04-26 | End: 2025-04-26

## 2025-04-26 RX ORDER — ONDANSETRON 2 MG/ML
4 INJECTION INTRAMUSCULAR; INTRAVENOUS EVERY 6 HOURS PRN
Status: DISCONTINUED | OUTPATIENT
Start: 2025-04-26 | End: 2025-04-26

## 2025-04-26 RX ORDER — DIPHENHYDRAMINE HCL 25 MG
25 TABLET ORAL EVERY 6 HOURS PRN
Status: DISCONTINUED | OUTPATIENT
Start: 2025-04-26 | End: 2025-04-28 | Stop reason: HOSPADM

## 2025-04-26 RX ADMIN — FENTANYL CITRATE 50 MCG: 50 INJECTION, SOLUTION INTRAMUSCULAR; INTRAVENOUS at 21:01

## 2025-04-26 RX ADMIN — OXYTOCIN: 10 INJECTION INTRAVENOUS at 20:47

## 2025-04-26 RX ADMIN — BENZOCAINE AND LEVOMENTHOL 1 APPLICATION: 200; 5 SPRAY TOPICAL at 21:53

## 2025-04-26 RX ADMIN — Medication 62.5 MILLI-UNITS/MIN: at 21:50

## 2025-04-26 RX ADMIN — FENTANYL CITRATE 50 MCG: 50 INJECTION INTRAMUSCULAR; INTRAVENOUS at 21:01

## 2025-04-26 RX ADMIN — METHYLERGONOVINE MALEATE 0.2 MG: 0.2 INJECTION INTRAVENOUS at 21:02

## 2025-04-26 RX ADMIN — OXYTOCIN 62.5 MILLI-UNITS/MIN: 10 INJECTION INTRAVENOUS at 21:50

## 2025-04-26 RX ADMIN — WITCH HAZEL 1 PAD: 500 SOLUTION RECTAL; TOPICAL at 21:53

## 2025-04-26 RX ADMIN — TRANEXAMIC ACID 1000 MG: 10 INJECTION, SOLUTION INTRAVENOUS at 21:01

## 2025-04-26 RX ADMIN — IBUPROFEN 600 MG: 600 TABLET, FILM COATED ORAL at 21:53

## 2025-04-26 RX ADMIN — LIDOCAINE HYDROCHLORIDE 300 MG: 10 INJECTION, SOLUTION EPIDURAL; INFILTRATION; INTRACAUDAL; PERINEURAL at 20:56

## 2025-04-26 RX ADMIN — PHENAZOPYRIDINE 100 MG: 100 TABLET ORAL at 22:18

## 2025-04-26 RX ADMIN — DOCUSATE SODIUM 100 MG: 100 CAPSULE, LIQUID FILLED ORAL at 21:53

## 2025-04-26 NOTE — TELEPHONE ENCOUNTER
"FOLLOW UP: No follow up needed    REASON FOR CONVERSATION: Contractions    SYMPTOMS:  38w2d, Contractions every 5 minutes lasting 1 min for the last hour. Denies leakage of fluid, vaginal bleeding, swelling. Reports pelvic pressure and decreased fetal movement.    OTHER: Contacted on call provider who recommended L&D triage for evaluation. Patient agreeable and will be leaving shortly to go to [facility]. Contacted L&D charge RN to notify of patient arrival in about 1 hour.    DISPOSITION: Go to  Now      Reason for Disposition   [1] History of prior delivery (multipara) AND [2] contractions < 10 minutes apart AND [3] present 1 hour    Answer Assessment - Initial Assessment Questions  1. ONSET: \"When did the symptoms begin?\"           2100    2. CONTRACTIONS: \"Describe the contractions that you are having.\" (e.g., duration, frequency, regularity, severity)        Lasting 5 minutes for 1 minute    3. PREGNANCY: \"How many weeks pregnant are you?\"        38w2d    4. PEPE: \"What date are you expecting to deliver?\"        25    5. PARITY: \"Have you had a baby before?\" If Yes, ask: \"How long did the labor last?\"        Second baby    6. FETAL MOVEMENT: \"Has the baby's movement decreased or changed significantly from normal?\"        Decreased fetal movement per mom. Patient felt baby move \"not too long ago\" but reports it is less     7. OTHER SYMPTOMS: \"Do you have any other symptoms?\" (e.g., leaking fluid from vagina, vaginal bleeding, fever, hand/facial swelling)        Denies LOF, bleeding, and swelling    Patient reports pelvic pressure    3cm dilated and 70% effaced at last appointment    Protocols used: Pregnancy - Labor-Adult-AH    "

## 2025-04-26 NOTE — H&P
H & P- Obstetrics   My Flannery 24 y.o. female MRN: [id number]  Unit/Bed#: -01 Encounter: [id number]      Assessment/Plan:    My is a 24 y.o.  at 38w3d admitted for labor. She made cervical change from 4/90/-2 to 5/90/-2.      SVE: Cervical Dilation: 5  Cervical Effacement: 90  Cervical Consistency: Soft  Fetal Station: -2  Presentation: Vertex  Method: Manual  OB Examiner: Rickie    Assessment & Plan  Normal labor  Cephalic by ultrasound. Clinical EFW by [facility]: 7lbs  GBS negative status  Plan for expectant management  Analgesia and/or epidural at patient request  Follow up CBC, Syphilis screening, blood type & antibody screen  Method of contraception: undecided, considering IUD  38 weeks gestation of pregnancy  Prenatal panel completed  28 week labs completed  EFW 51%ile (1901g) at 31w5d  Mild intermittent asthma without complication  Albuterol PRN  Avoid hemabate  Iron deficiency anemia  Follow up admission Hgb       Patient of: [facility]  This patient will be an INPATIENT  and I certify the anticipated length of stay is >2 Midnights  Discussed with Dr. James      SUBJECTIVE:    Chief Complaint: contractions     HPI: My Flannery is a 24 y.o.  with an PEPE of 2025, by Last Menstrual Period at 38w3d who is being admitted for labor . She complains of uterine contractions, occurring every 4-7 minutes, has no LOF, and reports spotting. She states that the baby moves as usual.  This pregnancy is complicated by Asthma, anemia. Denies chest pain, SOB, nausea, vomiting. All other review of systems is negative.       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    Iron deficiency anemia    Uterine contractions       OB History    Para Term  AB Living   5 1 1 0 3 1   SAB IAB Ectopic Multiple Live Births   1 2 0 0 1      # Outcome Date GA Lbr Chetan/2nd Weight Sex Type Anes PTL Lv   5 Current            4 SAB      Biochemical      3 IAB  10w0d          2 IAB  8w0d          1 Term 21 40w2d  3260 g (7 lb 3 oz) F Vag-Spont None  ELIAS       Past Medical History:   Diagnosis Date    Anemia     with last pregnancy     Asthma     albuterol prn    Depression     intermittent   no meds    History of depression 2021    Varicella     had vaccines       No past surgical history on file.    Social History     Tobacco Use    Smoking status: Never    Smokeless tobacco: Never   Substance Use Topics    Alcohol use: Not Currently     Comment: occasionally       Allergies   Allergen Reactions    Pollen Extract Eye Swelling     Itch throat, runny nose and eye watering          Medications Prior to Admission:     Prenatal Vit w/Wf-Ekmmzpjfn-AB (PNV PO)    albuterol (PROVENTIL HFA,VENTOLIN HFA) 90 mcg/act inhaler    ferrous sulfate 324 (65 Fe) mg    loratadine (CLARITIN) 10 mg tablet        OBJECTIVE:  Vitals:  Temp:  [98.4 °F (36.9 °C)] 98.4 °F (36.9 °C)  HR:  [85] 85  BP: (123)/(57) 123/57  There is no height or weight on file to calculate BMI.     Physical Exam:  General: Well appearing, no distress  Respiratory: Unlabored breathing  Cardiovascular: Regular rate.  Abdomen: Soft, gravid, nontender  Fundal Height: Appropriate for gestational age.  Extremities: Warm and well perfused.  Non tender.  Psychiatric: Behavioral normal        FHT:  Baseline Rate (FHR): 130  bpm  Variability: Moderate  Accelerations: 15 x 15 or greater  Decelerations: None    TOCO:   Contraction Frequency (minutes): 2-4  Contraction Duration (seconds): 60-80  Contraction Intensity: Mild/Moderate      Prenatal Labs: I have personally reviewed pertinent reports.  , Blood Type:   Lab Results   Component Value Date/Time    ABO Grouping A 11/02/2024 08:37 AM     , D (Rh type):   Lab Results   Component Value Date/Time    Rh Factor Positive 11/02/2024 08:37 AM     , Antibody Screen:   Lab Results   Component Value Date/Time    Antibody Screen Negative 11/02/2024 08:37 AM    , HCT/HGB:   Lab Results   Component Value Date/Time    Hematocrit 28.7 (L) 03/11/2025 09:53 AM    Hemoglobin 9.2 (L) 03/11/2025 09:53 AM      , MCV:   Lab Results   Component Value Date/Time    MCV 92 03/11/2025 09:53 AM      , Platelets:   Lab Results   Component Value Date/Time    Platelets 206 03/11/2025 09:53 AM      , 1 hour Glucola:   Lab Results   Component Value Date/Time    Glucose 117 02/11/2025 10:46 AM     , Rubella:   Lab Results   Component Value Date/Time    Rubella IgG Quant 20.4 11/02/2024 08:37 AM        , Syphilis:   Lab Results   Component Value Date/Time    Syphilis Total Antibody Non-reactive 02/11/2025 10:46 AM      , Urine Culture/Screen:   Lab Results   Component Value Date/Time    Urine Culture <10,000 cfu/ml Staphylococcus species (A) 11/02/2024 08:37 AM       , Hep B:   Lab Results   Component Value Date/Time    Hepatitis B Surface Ag Non-reactive 11/02/2024 08:37 AM     , Hep C:   Lab Results   Component Value Date/Time    Hepatitis C Ab Non-reactive 11/02/2024 08:37 AM      , HIV:   Lab Results   Component Value Date/Time    HIV-1 p24 Antigen Non-Reactive 11/02/2024 08:37 AM     , Chlamydia:   Lab Results   Component Value Date/Time    Chlamydia trachomatis, DNA Probe Negative 10/17/2024 10:56 AM     , Gonorrhea:   Lab Results   Component Value Date/Time    N gonorrhoeae, DNA Probe Negative 10/17/2024 10:56  "AM     , Group B Strep:    Lab Results   Component Value Date/Time    Strep Grp B PCR Negative 04/09/2025 03:54 PM            Sonia Damian MD  4/26/2025  6:58 PM        Portions of the record may have been created with voice recognition software.  Occasional wrong word or \"sound a like\" substitutions may have occurred due to the inherent limitations of voice recognition software.  Read the chart carefully and recognize, using context, where substitutions have occurred    "

## 2025-04-26 NOTE — TELEPHONE ENCOUNTER
"FOLLOW UP: no follow up needed    REASON FOR CONVERSATION: Contractions    SYMPTOMS: contractions     OTHER: n/a    DISPOSITION: Go to  Now. Patient is on her way to [place] L[facility] unit. On call OB provider and charge RN notified of her arrival.         Reason for Disposition   [1] History of prior delivery (multipara) AND [2] contractions < 10 minutes apart AND [3] present 1 hour    Answer Assessment - Initial Assessment Questions  1. ONSET: \"When did the symptoms begin?\"           4/25    2. CONTRACTIONS: \"Describe the contractions that you are having.\" (e.g., duration, frequency, regularity, severity)        Every 5 minutes     3. PREGNANCY: \"How many weeks pregnant are you?\"        38w3d    4. PEPE: \"What date are you expecting to deliver?\"        5/7/25    5. PARITY: \"Have you had a baby before?\" If Yes, ask: \"How long did the labor last?\"        Yes    6. FETAL MOVEMENT: \"Has the baby's movement decreased or changed significantly from normal?\"        No    7. OTHER SYMPTOMS: \"Do you have any other symptoms?\" (e.g., leaking fluid from vagina, vaginal bleeding, fever, hand/facial swelling)        Pelvic pressure    Protocols used: Pregnancy - Labor-Adult-AH    "

## 2025-04-26 NOTE — TELEPHONE ENCOUNTER
"Regardin wks / On the way to Hospital  ----- Message from Courtney GARCIA sent at 2025 12:36 PM EDT -----  Patient called and stated \"I am on the way to labor and delivery at [facility].  If someone could call ahead for me.\"    "

## 2025-04-26 NOTE — ASSESSMENT & PLAN NOTE
Cephalic by ultrasound. Clinical EFW by [facility]: 7lbs  GBS negative status  Plan for expectant management  Analgesia and/or epidural at patient request  Follow up CBC, Syphilis screening, blood type & antibody screen  Method of contraception: undecided, considering IUD

## 2025-04-27 LAB
2HR DELTA HS TROPONIN: >1 NG/L
ALBUMIN SERPL BCG-MCNC: 3.3 G/DL (ref 3.5–5)
ALP SERPL-CCNC: 103 U/L (ref 34–104)
ALT SERPL W P-5'-P-CCNC: 14 U/L (ref 7–52)
ANION GAP SERPL CALCULATED.3IONS-SCNC: 10 MMOL/L (ref 4–13)
AST SERPL W P-5'-P-CCNC: 23 U/L (ref 13–39)
ATRIAL RATE: 77 BPM
BASOPHILS # BLD AUTO: 0.03 THOUSANDS/ÂΜL (ref 0–0.1)
BASOPHILS NFR BLD AUTO: 0 % (ref 0–1)
BILIRUB SERPL-MCNC: 0.8 MG/DL (ref 0.2–1)
BUN SERPL-MCNC: 7 MG/DL (ref 5–25)
CALCIUM ALBUM COR SERPL-MCNC: 9 MG/DL (ref 8.3–10.1)
CALCIUM SERPL-MCNC: 8.4 MG/DL (ref 8.4–10.2)
CARDIAC TROPONIN I PNL SERPL HS: 3 NG/L (ref ?–50)
CARDIAC TROPONIN I PNL SERPL HS: <2 NG/L (ref ?–50)
CARDIAC TROPONIN I PNL SERPL HS: <2 NG/L (ref ?–50)
CHLORIDE SERPL-SCNC: 104 MMOL/L (ref 96–108)
CO2 SERPL-SCNC: 17 MMOL/L (ref 21–32)
CREAT SERPL-MCNC: 0.28 MG/DL (ref 0.6–1.3)
EOSINOPHIL # BLD AUTO: 0.04 THOUSAND/ÂΜL (ref 0–0.61)
EOSINOPHIL NFR BLD AUTO: 0 % (ref 0–6)
ERYTHROCYTE [DISTWIDTH] IN BLOOD BY AUTOMATED COUNT: 14.6 % (ref 11.6–15.1)
GFR SERPL CREATININE-BSD FRML MDRD: 165 ML/MIN/1.73SQ M
GLUCOSE SERPL-MCNC: 85 MG/DL (ref 65–140)
HCT VFR BLD AUTO: 29.5 % (ref 34.8–46.1)
HGB BLD-MCNC: 10 G/DL (ref 11.5–15.4)
IMM GRANULOCYTES # BLD AUTO: 0.09 THOUSAND/UL (ref 0–0.2)
IMM GRANULOCYTES NFR BLD AUTO: 1 % (ref 0–2)
LYMPHOCYTES # BLD AUTO: 1.41 THOUSANDS/ÂΜL (ref 0.6–4.47)
LYMPHOCYTES NFR BLD AUTO: 9 % (ref 14–44)
MCH RBC QN AUTO: 31.4 PG (ref 26.8–34.3)
MCHC RBC AUTO-ENTMCNC: 33.9 G/DL (ref 31.4–37.4)
MCV RBC AUTO: 93 FL (ref 82–98)
MONOCYTES # BLD AUTO: 1.08 THOUSAND/ÂΜL (ref 0.17–1.22)
MONOCYTES NFR BLD AUTO: 7 % (ref 4–12)
NEUTROPHILS # BLD AUTO: 12.73 THOUSANDS/ÂΜL (ref 1.85–7.62)
NEUTS SEG NFR BLD AUTO: 83 % (ref 43–75)
NRBC BLD AUTO-RTO: 0 /100 WBCS
P AXIS: -76 DEGREES
PLATELET # BLD AUTO: 167 THOUSANDS/UL (ref 149–390)
PMV BLD AUTO: 10.3 FL (ref 8.9–12.7)
POTASSIUM SERPL-SCNC: 4 MMOL/L (ref 3.5–5.3)
PR INTERVAL: 168 MS
PROT SERPL-MCNC: 6.1 G/DL (ref 6.4–8.4)
QRS AXIS: 79 DEGREES
QRSD INTERVAL: 72 MS
QT INTERVAL: 364 MS
QTC INTERVAL: 412 MS
RBC # BLD AUTO: 3.18 MILLION/UL (ref 3.81–5.12)
SODIUM SERPL-SCNC: 131 MMOL/L (ref 135–147)
T WAVE AXIS: 41 DEGREES
TREPONEMA PALLIDUM IGG+IGM AB [PRESENCE] IN SERUM OR PLASMA BY IMMUNOASSAY: NORMAL
VENTRICULAR RATE: 77 BPM
WBC # BLD AUTO: 15.38 THOUSAND/UL (ref 4.31–10.16)

## 2025-04-27 PROCEDURE — 80053 COMPREHEN METABOLIC PANEL: CPT

## 2025-04-27 PROCEDURE — 85025 COMPLETE CBC W/AUTO DIFF WBC: CPT

## 2025-04-27 PROCEDURE — 93005 ELECTROCARDIOGRAM TRACING: CPT

## 2025-04-27 PROCEDURE — 93010 ELECTROCARDIOGRAM REPORT: CPT | Performed by: INTERNAL MEDICINE

## 2025-04-27 PROCEDURE — 84484 ASSAY OF TROPONIN QUANT: CPT

## 2025-04-27 PROCEDURE — 99024 POSTOP FOLLOW-UP VISIT: CPT | Performed by: STUDENT IN AN ORGANIZED HEALTH CARE EDUCATION/TRAINING PROGRAM

## 2025-04-27 RX ADMIN — DOCUSATE SODIUM 100 MG: 100 CAPSULE, LIQUID FILLED ORAL at 18:50

## 2025-04-27 RX ADMIN — PHENAZOPYRIDINE 100 MG: 100 TABLET ORAL at 09:17

## 2025-04-27 RX ADMIN — IBUPROFEN 600 MG: 600 TABLET, FILM COATED ORAL at 18:51

## 2025-04-27 RX ADMIN — PHENAZOPYRIDINE 100 MG: 100 TABLET ORAL at 18:50

## 2025-04-27 RX ADMIN — IBUPROFEN 600 MG: 600 TABLET, FILM COATED ORAL at 06:29

## 2025-04-27 RX ADMIN — LORATADINE 10 MG: 10 TABLET ORAL at 09:17

## 2025-04-27 RX ADMIN — DOCUSATE SODIUM 100 MG: 100 CAPSULE, LIQUID FILLED ORAL at 09:17

## 2025-04-27 NOTE — PLAN OF CARE
Problem: BIRTH - VAGINAL/ SECTION  Goal: Fetal and maternal status remain reassuring during the birth process  Description: INTERVENTIONS:- Monitor vital signs- Monitor fetal heart rate- Monitor uterine activity- Monitor labor progression (vaginal delivery)- DVT prophylaxis- Antibiotic prophylaxis  Outcome: Progressing  Goal: Emotionally satisfying birthing experience for mother/fetus  Description: Interventions:- Assess, plan, implement and evaluate the nursing care given to the patient in labor- Advocate the philosophy that each childbirth experience is a unique experience and support the family's chosen level of involvement and control during the labor process - Actively participate in both the patient's and family's teaching of the birth process- Consider cultural, Mormon and age-specific factors and plan care for the patient in labor  Outcome: Progressing     Problem: Knowledge Deficit  Goal: Verbalizes understanding of labor plan  Description: Assess patient/family/caregiver's baseline knowledge level and ability to understand information.  Provide education via patient/family/caregiver's preferred learning method at appropriate level of understanding. 1. Provide teaching at level of understanding.2. Provide teaching via preferred learning method(s).  Outcome: Progressing     Problem: Labor & Delivery  Goal: Manages discomfort  Description: Assess and monitor for signs and symptoms of discomfort.  Assess patient's pain level regularly and per hospital policy.  Administer medications as ordered. Support use of nonpharmacological methods to help control pain such as distraction, imagery, relaxation, and application of heat and cold.  Collaborate with interdisciplinary team and patient to determine appropriate pain management plan.1. Include patient in decisions related to comfort.2. Offer non-pharmacological pain management interventions.3. Report ineffective pain management to physician.  Outcome:  Progressing  Goal: Patient vital signs are stable  Description: 1. Assess vital signs - vaginal delivery.  Outcome: Progressing

## 2025-04-27 NOTE — L&D DELIVERY NOTE
OBGYN Vaginal Delivery Summary  My Flannery 24 y.o. female MRN: [id number]  Unit/Bed#: -01 Encounter: [id number]    Predelivery Diagnosis:  1. Pregnancy at 38 weeks gestation  2. Asthma  3. Iron deficiency anemia     Postdelivery Diagnosis:  1. Same as above  2. Delivery of term   3. Postpartum hemorrhage    Procedure: spontaneous vaginal delivery, repair of right periurethral laceration extending to the clitoral hughes    Attending: Dr. James    Assistant: MD Rickie    Anesthesia: Epidural    QBL: 1056mL  Admission Hgb: 11.3 g/dL  Admission platelets: 171 thousands/uL    Complications: none apparent    Specimens: cord blood, arterial and venous cord blood gases, placenta to storage    Findings:   1. Viable male at , with APGARS of 9 and 9 at 1 and 5 minutes respectively. Weight pending at time of dictation.  2. Spontaneous delivery of intact placenta at . Normal insertion of a 3 vessel umbilical cord  3. Right periurethral laceration extending to clitoral hughes  4. Blood gases:  Umbilical Cord Venous Blood Gas:  Results from last 7 days   Lab Units 25   PH COV  7.448   PCO2 COV mm HG 29.0   HCO3 COV mmol/L 19.6   BASE EXC COV mmol/L -2.8*   O2 CT CD VB mL/dL 19.8   O2 HGB, VENOUS CORD % 85.8     Umbilical Cord Arterial Blood Gas:  Results from last 7 days   Lab Units 25   PH COA  7.332   PCO2 COA  41.4   PO2 COA mm HG 23.6   HCO3 COA mmol/L 21.4   BASE EXC COA mmol/L -4.2*   O2 CONTENT CORD ART ml/dl 13.7   O2 HGB, ARTERIAL CORD % 57.8       Brief history and labor course:  My Flannery is a 24 y.o.  at 38wk3d. She presented to labor and delivery in labor. She progressed to 8/100/0 and AROM was performed for clear fluid. She progressed to complete cervical dilation and began pushing.     Description of procedure  After pushing for 3 minutes, the patient delivered a viable male  at  on 25, weight pending at time of dictation, apgars of 9 (1  min) and 9 (5 min). The fetal vertex delivered spontaneously. Baby restituted  to AMAYA. There was no nuchal cord. The anterior (right) shoulder delivered atraumatically with maternal expulsive forces and the assistance of gentle downward traction. The posterior shoulder delivered with maternal expulsive forces and the assistance of gentle upward traction. The remainder of the fetus then delivered spontaneously.     Upon delivery the infant was placed on the mother's abdomen and delayed cord clamping was performed. The umbilical cord was then doubly clamped and cut. The infant was noted to cry spontaneously and was moving all extremities appropriately. There was no evidence for injury. Awaiting nurse resuscitators evaluated the . Arterial and venous cord blood gases and cord blood were collected for analysis and were promptly sent to the lab. In the immediate post-partum, IV pitocin was administered, and the uterus was noted to contract down well with massage and pitocin. The placenta delivered with uterine massage at 2152 and was noted to be intact and had normal insertion of a 3 vessel cord. The placenta was sent to storage.    A period of uterine atony was noted and the patient received TXA and methergine with improvement in uterine tone.     The vagina, cervix, perineum, and rectum were inspected. A right periurethral laceration extending to clitoral hughes was noted. Repair was completed with 4-0 Vicryl.     After cleaning with betadine, a red rubber was placed for better visualization of the urethra. After achieving anesthesia with local anesthetic, the apex of the periurethral laceration was identified and an anchoring suture was placed. The laceration was closed in a running locked fashion to the clitoris. An additional interrupted suture was placed on the clitoral hughes for hemostasis. Adequate hemostasis was achieved. A keyes cathter was placed to remain in place until the morning.       At the  conclusion of the procedure, all needle, sponge, and instrument counts were noted to be correct.      Dr. James was present and participated in all key portions of the case.    Disposition:  Patient tolerated the procedure well and was recovering in labor and delivery room.  is admitted to  nursery.      Sonia Damian MD  2025  9:18 PM

## 2025-04-27 NOTE — PLAN OF CARE
Problem: BIRTH - VAGINAL/ SECTION  Goal: Fetal and maternal status remain reassuring during the birth process  Description: INTERVENTIONS:- Monitor vital signs- Monitor fetal heart rate- Monitor uterine activity- Monitor labor progression (vaginal delivery)- DVT prophylaxis- Antibiotic prophylaxis  2025 by Vickie Sarabia RN  Outcome: Progressing  2025 by Vickie Sarabia RN  Outcome: Progressing  Goal: Emotionally satisfying birthing experience for mother/fetus  Description: Interventions:- Assess, plan, implement and evaluate the nursing care given to the patient in labor- Advocate the philosophy that each childbirth experience is a unique experience and support the family's chosen level of involvement and control during the labor process - Actively participate in both the patient's and family's teaching of the birth process- Consider cultural, Sabianism and age-specific factors and plan care for the patient in labor  2025 by Vickie Sarabia RN  Outcome: Progressing  2025 by Vickie Sarabia RN  Outcome: Progressing     Problem: POSTPARTUM  Goal: Experiences normal postpartum course  Description: INTERVENTIONS:- Monitor maternal vital signs- Assess uterine involution and lochia  2025 by Vickie Sarabia RN  Outcome: Progressing  2025 by Vickie Sarabia RN  Outcome: Progressing  Goal: Appropriate maternal -  bonding  Description: INTERVENTIONS:- Identify family support- Assess for appropriate maternal/infant bonding -Encourage maternal/infant bonding opportunities- Referral to  or  as needed  2025 by Vickie Sarabia RN  Outcome: Progressing  2025 by Vickie Sarabia RN  Outcome: Progressing  Goal: Establishment of infant feeding pattern  Description: INTERVENTIONS:- Assess breast/bottle feeding- Refer to lactation as needed  2025 by Vickie Sarabia RN  Outcome: Progressing  2025 by Vickie Sarabia  RN  Outcome: Progressing  Goal: Incision(s), wounds(s) or drain site(s) healing without S/S of infection  Description: INTERVENTIONS- Assess and document dressing, incision, wound bed, drain sites and surrounding tissue- Provide patient and family education- Perform skin care/dressing changes every   4/27/2025 0229 by Vickie Sarabia RN  Outcome: Progressing  4/27/2025 0228 by Vickie Sarabia RN  Outcome: Progressing     Problem: PAIN - ADULT  Goal: Verbalizes/displays adequate comfort level or baseline comfort level  Description: Interventions:- Encourage patient to monitor pain and request assistance- Assess pain using appropriate pain scale- Administer analgesics based on type and severity of pain and evaluate response- Implement non-pharmacological measures as appropriate and evaluate response- Consider cultural and social influences on pain and pain management- Notify physician/advanced practitioner if interventions unsuccessful or patient reports new pain  Outcome: Progressing     Problem: INFECTION - ADULT  Goal: Absence or prevention of progression during hospitalization  Description: INTERVENTIONS:- Assess and monitor for signs and symptoms of infection- Monitor lab/diagnostic results- Monitor all insertion sites, i.e. indwelling lines, tubes, and drains- Monitor endotracheal if appropriate and nasal secretions for changes in amount and color- Vidalia appropriate cooling/warming therapies per order- Administer medications as ordered- Instruct and encourage patient and family to use good hand hygiene technique- Identify and instruct in appropriate isolation precautions for identified infection/condition  Outcome: Progressing  Goal: Absence of fever/infection during neutropenic period  Description: INTERVENTIONS:- Monitor WBC  Outcome: Progressing     Problem: SAFETY ADULT  Goal: Patient will remain free of falls  Description: INTERVENTIONS:- Educate patient/family on patient safety including physical  limitations- Instruct patient to call for assistance with activity - Consult OT/PT to assist with strengthening/mobility - Keep Call bell within reach- Keep bed low and locked with side rails adjusted as appropriate- Keep care items and personal belongings within reach- Initiate and maintain comfort rounds- Make Fall Risk Sign visible to staff- Offer Toileting every  Hours, in advance of need- Initiate/Maintain alarm- Obtain necessary fall risk management equipment: - Apply yellow socks and bracelet for high fall risk patients- Consider moving patient to room near nurses station  Outcome: Progressing  Goal: Maintain or return to baseline ADL function  Description: INTERVENTIONS:-  Assess patient's ability to carry out ADLs; assess patient's baseline for ADL function and identify physical deficits which impact ability to perform ADLs (bathing, care of mouth/teeth, toileting, grooming, dressing, etc.)- Assess/evaluate cause of self-care deficits - Assess range of motion- Assess patient's mobility; develop plan if impaired- Assess patient's need for assistive devices and provide as appropriate- Encourage maximum independence but intervene and supervise when necessary- Involve family in performance of ADLs- Assess for home care needs following discharge - Consider OT consult to assist with ADL evaluation and planning for discharge- Provide patient education as appropriate  Outcome: Progressing  Goal: Maintains/Returns to pre admission functional level  Description: INTERVENTIONS:- Perform AM-PAC 6 Click Basic Mobility/ Daily Activity assessment daily.- Set and communicate daily mobility goal to care team and patient/family/caregiver. - Collaborate with rehabilitation services on mobility goals if consulted- Perform Range of Motion  times a day.- Reposition patient every  hours.- Dangle patient  times a day- Stand patient  times a day- Ambulate patient  times a day- Out of bed to chair  times a day - Out of bed for meals  times a day- Out of bed for toileting- Record patient progress and toleration of activity level   Outcome: Progressing     Problem: Knowledge Deficit  Goal: Patient/family/caregiver demonstrates understanding of disease process, treatment plan, medications, and discharge instructions  Description: Complete learning assessment and assess knowledge base.Interventions:- Provide teaching at level of understanding- Provide teaching via preferred learning methods  Outcome: Progressing     Problem: DISCHARGE PLANNING  Goal: Discharge to home or other facility with appropriate resources  Description: INTERVENTIONS:- Identify barriers to discharge w/patient and caregiver- Arrange for needed discharge resources and transportation as appropriate- Identify discharge learning needs (meds, wound care, etc.)- Arrange for interpretive services to assist at discharge as needed- Refer to Case Management Department for coordinating discharge planning if the patient needs post-hospital services based on physician/advanced practitioner order or complex needs related to functional status, cognitive ability, or social support system  Outcome: Progressing

## 2025-04-27 NOTE — LACTATION NOTE
This note was copied from a baby's chart.  CONSULT - LACTATION  Baby Boy Flannery (Kiara) 1 days male MRN: [id number]    [facility] AN NURSERY Room / Bed: (N)/(N) Encounter: [id number]    Maternal Information     MOTHER:  My Flannery  Maternal Age: 24 y.o.  OB History: # 1 - Date: [birth year], Sex: Female, Weight: 3260 g (7 lb 3 oz), GA: 40w2d, Type: Vaginal, Spontaneous, Apgar1: None, Apgar5: None, Living: Living, Birth Comments: None    # 2 - Date: 2022, Sex: None, Weight: None, GA: 8w0d, Type: None, Apgar1: None, Apgar5: None, Living: None, Birth Comments: None    # 3 - Date: 2022, Sex: None, Weight: None, GA: 10w0d, Type: None, Apgar1: None, Apgar5: None, Living: None, Birth Comments: None    # 4 - Date: 2023, Sex: None, Weight: None, GA: None, Type: Biochemical, Apgar1: None, Apgar5: None, Living: None, Birth Comments: None    # 5 - Date: 04/26/25, Sex: Male, Weight: 3345 g (7 lb 6 oz), GA: 38w3d, Type: Vaginal, Spontaneous, Apgar1: 9, Apgar5: 9, Living: Living, Birth Comments: None   Previous breast reduction surgery? No    Lactation history:   Has patient previously breast fed: Yes   How long had patient previously breast fed: 6 months   Previous breast feeding complications: Other (Comment) (mastitis)   History reviewed. No pertinent surgical history.    Birth information:  YOB: 2024   Time of birth: 8:45 PM   Sex: male   Delivery type: Vaginal, Spontaneous   Birth Weight: 3345 g (7 lb 6 oz)   Percent of Weight Change: 0%     Gestational Age: 38w3d   [unfilled]    Reason for Consult:    Reason for Consult: Initial assessment (routine) - 10 min, Latch Assess (routine) - 10 min    Risk Factors:         Breast and nipple assessment:   Breasts/Nipples  Left Breast: Soft  Right Breast: Soft  Left Nipple: Everted, Sore  Right Nipple: Everted, Sore, Other (Comment) (red erica across nipple)  Intervention: Hand expression (positioning)  Breastfeeding  Progress: Not yet established    OB Lactation Tools:         Breast Pump:           Varney Assessment:  frenulum is a little tight.    Feeding assessment: feeding well ( mom just needed help with positioning the new baby)  LATCH:  Latch: Grasps breast, tongue down, lips flanged, rhythmic sucking   Audible Swallowing: Spontaneous and intermittent (24 hours old)   Type of Nipple: Everted (After stimulation)   Comfort (Breast/Nipple): Filling, red/small blisters/bruises, mild/moderate discomfort   Hold (Positioning): Partial assist, teach one side, mother does other, staff holds   LATCH Score: 8           Feeding recommendations:  breast feed on demand    Initial Consult:     Met with My, who requested to be seen by lactation. Tracy is an experienced breastfeeding mom who  her 4 yo for 6 months. Mom reports that baby is latching but breastfeeding is painful the whole feeding.     On entering room, mom had baby latched on the left side in the cradle position. Latch was shallow. Had mom break suction and we discussed the cross cradle and football holds to achieve a deeper latch.     Helped mom place baby in the football hold on the right side. A deeper latch was achieve with mom reporting a much more comfortable latch ( initial latch on pain that lasted about 30 seconds).     Positioning and Latch reviewed:  1. Meet early feeding cues.  2. Bring baby to breast skin to skin.  3  Position baby up at chest level using pillows for support .   4.Baby's ear, shoulder, hip in alignment.  5. Bring baby to breast,not breast to baby ( no hunching over ).  6.Align nose to nipple and drag nipple down to chin to achieve a wide open mouth.   7. Baby's chin and lower lip touching the breast first and both cheeks touching moms breast.  8. Initial latch on pain should last less then a minute (Baby's cheeks should not be puckered and you should not hear any clicking sounds).  9. Offer baby both breasts at  feedings.    Encouraged mom to call for additional assistance as needed. Mom feels much more confident after postioning and latch refresher.          Kristyn Holt RN 4/27/2025 7:20 PM

## 2025-04-27 NOTE — PROGRESS NOTES
Obstetrics Progress Note  My Flannery 24 y.o. female MRN: [id number]  Unit/Bed#: -01 Encounter: [id number]    Assessment/Plan:  Postpartum Day #1 s/p . Stable. Baby in room. By issue:  Assessment & Plan   (spontaneous vaginal delivery)  PPH: 1056mL Hgb 11.3 > 10.1, asymptomatic  Routine postpartum care  Encourage ambulation  Encourage familial bonding  Lactation support as needed  Pain: Motrin/Tylenol around the clock  Keyes catheter: in place due to periurethral lac, adequate UOP, reassess laceration later today  DVT Ppx: ambulation, SCDs  Mild intermittent asthma without complication  Albuterol PRN  Avoid hemabate  Iron deficiency anemia  Admission Hgb 11.3      Anticipate discharge PPD#2.    Subjective/Objective   Chief Complaint:   Post delivery     Subjective:   Pain: manageable, uncomfortable with keyes. Tolerating PO: yes. Voiding: keyes in place. Flatus: yes. Ambulating: minimal. Chest pain: no. Shortness of breath: no. Leg pain: no. Lochia: within normal limits. Infant feeding: breast.    Objective:   Vitals:   Temp:  [97.5 °F (36.4 °C)-98.4 °F (36.9 °C)] 98.2 °F (36.8 °C)  HR:  [74-93] 74  BP: ()/() 117/70  Resp:  [18] 18  SpO2:  [97 %-100 %] 98 %  O2 Device: None (Room air)       Intake/Output Summary (Last 24 hours) at 2025 0514  Last data filed at 2025 2250  Gross per 24 hour   Intake --   Output 1056 ml   Net -1056 ml       Lab Results   Component Value Date    WBC 15.38 (H) 2025    HGB 10.0 (L) 2025    HCT 29.5 (L) 2025    MCV 93 2025     2025       Physical Exam:   General: alert and oriented x3, in no apparent distress  Cardiovascular: regular rate and rhythm  Pulmonary: normal effort, clear to auscultation bilaterally  Abdomen: Soft, non-tender, non-distended, no rebound or guarding. Uterine fundus firm and non-tender, -1 cm below the umbilicus   Extremities: Non tender     Sonia Damian MD  PGY-2, OBGYN  2025, 5:14  AM

## 2025-04-27 NOTE — ASSESSMENT & PLAN NOTE
PPH: 1056mL Hgb 11.3 > 10.1, asymptomatic  Routine postpartum care  Encourage ambulation  Encourage familial bonding  Lactation support as needed  Pain: Motrin/Tylenol around the clock  San catheter: in place due to periurethral lac, adequate UOP, reassess laceration later today  DVT Ppx: ambulation, SCDs

## 2025-04-27 NOTE — OB LABOR/OXYTOCIN SAFETY PROGRESS
Labor Progress Note - My Flannery 24 y.o. female MRN: [id number]    Unit/Bed#: -01 Encounter: [id number]       Contraction Frequency (minutes): 2-4  Contraction Intensity: Mild/Moderate     Cervical Dilation: 8        Cervical Effacement: 100  Fetal Station: 0  Baseline Rate (FHR): 130 bpm  Fetal Heart Rate (FHT): 130 BPM  FHR Category: I               Vital Signs:   Vitals:    04/26/25 1915   BP: 112/55   Pulse: 90   Resp: 18   Temp:    SpO2: 100%       Notes/comments:   Patient uncomfortable. FHT cat I. SVE as above. AROM for clear fluid. Will reassess in 2 hours or sooner if clinically indicated. Dr. James aware.      Sonia Damian MD 4/26/2025 8:36 PM

## 2025-04-27 NOTE — DISCHARGE SUMMARY
Discharge Summary - OB/GYN   Name: My Flannery 24 y.o. female I MRN: [id number]  Unit/Bed#: -01 I Date of Admission: 2025   Date of Service: 2025 I Hospital Day: 2    ADMISSION  Patient of: [facility]  Admission Date: 2025   Admitting Attending: Dr. 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 Wakefield  Discharge Diagnosis:   Same, Delivered  Clinical course: Admission to Delivery  My Flannery is a 24 y.o.  at 38wk3d. She presented to labor and delivery in labor. She progressed to 8/100/0 and AROM was performed for clear fluid. She progressed to complete cervical dilation and began pushing.        Delivery  Route of Delivery: Vaginal, Spontaneous      Anesthesia: Local,   QBL: Non-Surgical QBL (mL): 1056          Delivery: Vaginal, Spontaneous at 2025 8:45 PM  Laceration: Perineal: None Repaired?      Baby's Weight: 3345 g (7 lb 6 oz); 117.99    Apgar scores: 9  and 9  at 1 and 5 minutes, respectively    Clinical Course: Post-Delivery:  The post delivery course was unremarkable.    On the day of discharge, the patient was ambulating, voiding spontaneously, tolerating oral intake, and hemodynamically stable. She was able to reasonably perform all ADLs. She had appropriate bowel function. Pain was well-controlled. She was discharged  home on postpartum/postop day #2 without complications. Patient was instructed to follow up with her OB as an outpatient and was given appropriate warnings to call her provider with problems or concerns.    Pertinent lab findings included:   Blood type A+.     Last three Hgb values:  Lab Results   Component Value Date    HGB 10.0 (L) 04/27/2025    HGB 11.3 (L) 04/26/2025    HGB 9.2 (L) 03/11/2025      Discharge med list:  Contraception: undecided     Medication List      ASK your doctor about these medications     albuterol 90 mcg/act inhaler; Commonly known as: PROVENTIL HFA,VENTOLIN   HFA; Inhale 2 puffs every 4 (four) hours as needed for wheezing   ferrous sulfate 324 (65 Fe) mg; Take 1 tablet (324 mg total) by mouth   daily before breakfast   loratadine 10 mg tablet; Commonly known as: CLARITIN; Take 1 tablet (10   mg total) by mouth daily   PNV PO       Condition at discharge:   good     Disposition:   Home    Planned Readmission:   Caryn Howell  PGY-1 OB/GYN  04/28/25  6:51 AM

## 2025-04-27 NOTE — QUICK NOTE
Patient evaluated at the bedside for chest heaviness when standing up. Reports when she stood up that her chest felt heavy. This is different than the asthma she usually experiences and does not feel like she is wheezing. The heaviness is midline, near her sternum.     Nontender to palpation. Heart with regular rate and rhythm. Lungs clear to auscultation bilaterally. Denies feeling palpitations or lightheadedness. QBL from delivery was 1056mL.   Normotensive, regular rate, normal SPO2    Plan to collect EKG, CBC, CMP, and troponins.    Discussed with Dr. Jacob Damian MD   PGY-2, OBGYN  4/27/2025  12:45 AM

## 2025-04-27 NOTE — OB LABOR/OXYTOCIN SAFETY PROGRESS
Labor Progress Note - My Flannery 24 y.o. female MRN: [id number]    Unit/Bed#: -01 Encounter: [id number]       Contraction Frequency (minutes): 2-4  Contraction Intensity: Mild/Moderate     Cervical Dilation: 7        Cervical Effacement: 80  Fetal Station: -1  Baseline Rate (FHR): 130 bpm  Fetal Heart Rate (FHT): 130 BPM  FHR Category: 1               Vital Signs:   Vitals:    04/26/25 1915   BP: 112/55   Pulse: 90   Resp: 18   Temp:    SpO2: 100%       Notes/comments:      Declines epidural and amniotomy at this time. Progressing appropriately in active labor.     Angelina James MD 4/26/2025 8:12 PM

## 2025-04-28 VITALS
BODY MASS INDEX: 30.88 KG/M2 | DIASTOLIC BLOOD PRESSURE: 53 MMHG | SYSTOLIC BLOOD PRESSURE: 98 MMHG | OXYGEN SATURATION: 97 % | HEART RATE: 81 BPM | RESPIRATION RATE: 18 BRPM | WEIGHT: 167.8 LBS | HEIGHT: 62 IN | TEMPERATURE: 97.7 F

## 2025-04-28 PROCEDURE — NC001 PR NO CHARGE: Performed by: OBSTETRICS & GYNECOLOGY

## 2025-04-28 PROCEDURE — 99024 POSTOP FOLLOW-UP VISIT: CPT | Performed by: OBSTETRICS & GYNECOLOGY

## 2025-04-28 RX ORDER — BENZOCAINE/MENTHOL 6 MG-10 MG
1 LOZENGE MUCOUS MEMBRANE DAILY PRN
Start: 2025-04-28

## 2025-04-28 RX ORDER — ACETAMINOPHEN 325 MG/1
650 TABLET ORAL EVERY 4 HOURS PRN
Qty: 30 TABLET | Refills: 0 | Status: SHIPPED | OUTPATIENT
Start: 2025-04-28

## 2025-04-28 RX ORDER — IBUPROFEN 600 MG/1
600 TABLET, FILM COATED ORAL EVERY 6 HOURS PRN
Qty: 30 TABLET | Refills: 1 | Status: SHIPPED | OUTPATIENT
Start: 2025-04-28

## 2025-04-28 RX ADMIN — IBUPROFEN 600 MG: 600 TABLET, FILM COATED ORAL at 12:26

## 2025-04-28 RX ADMIN — LORATADINE 10 MG: 10 TABLET ORAL at 09:35

## 2025-04-28 RX ADMIN — IBUPROFEN 600 MG: 600 TABLET, FILM COATED ORAL at 00:12

## 2025-04-28 RX ADMIN — DOCUSATE SODIUM 100 MG: 100 CAPSULE, LIQUID FILLED ORAL at 09:35

## 2025-04-28 RX ADMIN — PHENAZOPYRIDINE 100 MG: 100 TABLET ORAL at 07:36

## 2025-04-28 NOTE — PLAN OF CARE
Problem: POSTPARTUM  Goal: Experiences normal postpartum course  Description: INTERVENTIONS:- Monitor maternal vital signs- Assess uterine involution and lochia  Outcome: Progressing  Goal: Appropriate maternal -  bonding  Description: INTERVENTIONS:- Identify family support- Assess for appropriate maternal/infant bonding -Encourage maternal/infant bonding opportunities- Referral to  or  as needed  Outcome: Progressing  Goal: Establishment of infant feeding pattern  Description: INTERVENTIONS:- Assess breast/bottle feeding- Refer to lactation as needed  Outcome: Progressing  Goal: Incision(s), wounds(s) or drain site(s) healing without S/S of infection  Description: INTERVENTIONS- Assess and document dressing, incision, wound bed, drain sites and surrounding tissue  Outcome: Progressing     Problem: PAIN - ADULT  Goal: Verbalizes/displays adequate comfort level or baseline comfort level  Description: Interventions:- Encourage patient to monitor pain and request assistance- Assess pain using appropriate pain scale- Administer analgesics based on type and severity of pain and evaluate response- Implement non-pharmacological measures as appropriate and evaluate response- Consider cultural and social influences on pain and pain management- Notify physician/advanced practitioner if interventions unsuccessful or patient reports new pain  Outcome: Progressing     Problem: INFECTION - ADULT  Goal: Absence or prevention of progression during hospitalization  Description: INTERVENTIONS:- Assess and monitor for signs and symptoms of infection- Monitor lab/diagnostic results- Monitor all insertion sites, i.e. indwelling lines, tubes, and drains- Monitor endotracheal if appropriate and nasal secretions for changes in amount and color- [place] appropriate cooling/warming therapies per order- Administer medications as ordered- Instruct and encourage patient and family to use good hand hygiene  technique- Identify and instruct in appropriate isolation precautions for identified infection/condition  Outcome: Progressing  Goal: Absence of fever/infection during neutropenic period  Description: INTERVENTIONS:- Monitor WBC  Outcome: Progressing     Problem: SAFETY ADULT  Goal: Patient will remain free of falls  Description: INTERVENTIONS:- Educate patient/family on patient safety including physical limitations- Instruct patient to call for assistance with activity - Consult OT/PT to assist with strengthening/mobility - Keep Call bell within reach- Keep bed low and locked with side rails adjusted as appropriate- Keep care items and personal belongings within reach- Initiate and maintain comfort rounds- Make Fall Risk Sign visible to staff-  Outcome: Progressing  Goal: Maintain or return to baseline ADL function  Description: INTERVENTIONS:-  Assess patient's ability to carry out ADLs; assess patient's baseline for ADL function and identify physical deficits which impact ability to perform ADLs (bathing, care of mouth/teeth, toileting, grooming, dressing, etc.)- Assess/evaluate cause of self-care deficits - Assess range of motion- Assess patient's mobility; develop plan if impaired- Assess patient's need for assistive devices and provide as appropriate- Encourage maximum independence but intervene and supervise when necessary- Involve family in performance of ADLs- Assess for home care needs following discharge - Consider OT consult to assist with ADL evaluation and planning for discharge- Provide patient education as appropriate  Outcome: Progressing  Goal: Maintains/Returns to pre admission functional level  Description: INTERVENTIONS:- Perform AM-PAC 6 Click Basic Mobility/ Daily Activity assessment daily.- Set and communicate daily mobility goal to care team and patient/family/caregiver. - Collaborate with rehabilitation services on mobility goals if consulted-  Outcome: Progressing     Problem: Knowledge  Deficit  Goal: Patient/family/caregiver demonstrates understanding of disease process, treatment plan, medications, and discharge instructions  Description: Complete learning assessment and assess knowledge base.Interventions:- Provide teaching at level of understanding- Provide teaching via preferred learning methods  Outcome: Progressing     Problem: DISCHARGE PLANNING  Goal: Discharge to home or other facility with appropriate resources  Description: INTERVENTIONS:- Identify barriers to discharge w/patient and caregiver- Arrange for needed discharge resources and transportation as appropriate- Identify discharge learning needs (meds, wound care, etc.)- Arrange for interpretive services to assist at discharge as needed- Refer to Case Management Department for coordinating discharge planning if the patient needs post-hospital services based on physician/advanced practitioner order or complex needs related to functional status, cognitive ability, or social support system  Outcome: Progressing

## 2025-04-28 NOTE — ASSESSMENT & PLAN NOTE
PPH: 1056mL Hgb 11.3 > 10.1, asymptomatic  Routine postpartum care  Encourage ambulation  Encourage familial bonding  Lactation support as needed  Pain: Motrin/Tylenol around the clock  Voiding spontaneously  DVT Ppx: ambulation, SCDs

## 2025-04-28 NOTE — PLAN OF CARE
Problem: POSTPARTUM  Goal: Experiences normal postpartum course  Description: INTERVENTIONS:- Monitor maternal vital signs- Assess uterine involution and lochia  2025 by Beth Medeiros RN  Outcome: Progressing  2025 by Beth Medeiros RN  Outcome: Progressing  Goal: Appropriate maternal -  bonding  Description: INTERVENTIONS:- Identify family support- Assess for appropriate maternal/infant bonding -Encourage maternal/infant bonding opportunities- Referral to  or  as needed  2025 by Beth Medeiros RN  Outcome: Progressing  2025 by Beth Medeiros RN  Outcome: Progressing  Goal: Establishment of infant feeding pattern  Description: INTERVENTIONS:- Assess breast/bottle feeding- Refer to lactation as needed  2025 by Beth eMdeiros RN  Outcome: Progressing  2025 by Beth Medeiros RN  Outcome: Progressing  Goal: Incision(s), wounds(s) or drain site(s) healing without S/S of infection  Description: INTERVENTIONS- Assess and document perineum and surrounding tissue- Provide patient and family education  2025 by Beth Medeiros RN  Outcome: Progressing  2025 by Beth Medeiros RN  Outcome: Progressing     Problem: PAIN - ADULT  Goal: Verbalizes/displays adequate comfort level or baseline comfort level  Description: Interventions:- Encourage patient to monitor pain and request assistance- Assess pain using appropriate pain scale- Administer analgesics based on type and severity of pain and evaluate response- Implement non-pharmacological measures as appropriate and evaluate response- Consider cultural and social influences on pain and pain management- Notify physician/advanced practitioner if interventions unsuccessful or patient reports new pain  2025 by Beth Medeiros RN  Outcome: Progressing  2025 by  Beth Medeiros RN  Outcome: Progressing     Problem: INFECTION - ADULT  Goal: Absence or prevention of progression during hospitalization  Description: INTERVENTIONS:- Assess and monitor for signs and symptoms of infection- Monitor lab/diagnostic results- Monitor all insertion sites, i.e. indwelling lines, tubes, and drains- Monitor endotracheal if appropriate and nasal secretions for changes in amount and color- [place] appropriate cooling/warming therapies per order- Administer medications as ordered- Instruct and encourage patient and family to use good hand hygiene technique- Identify and instruct in appropriate isolation precautions for identified infection/condition  4/28/2025 0938 by Beth Medeiros RN  Outcome: Progressing  4/28/2025 0938 by Beth Medeiros RN  Outcome: Progressing  Goal: Absence of fever/infection during neutropenic period  Description: INTERVENTIONS:- Monitor WBC  4/28/2025 0938 by Beth Medeiros RN  Outcome: Progressing  4/28/2025 0938 by Beth Medeiros RN  Outcome: Progressing     Problem: SAFETY ADULT  Goal: Patient will remain free of falls  Description: INTERVENTIONS:- Educate patient/family on patient safety including physical limitations- Instruct patient to call for assistance with activity  - Keep Call bell within reach- Keep bed low and locked with side rails adjusted as appropriate- Keep care items and personal belongings within reach- Initiate and maintain comfort rounds-   4/28/2025 0938 by Beth Medeiros RN  Outcome: Progressing  4/28/2025 0938 by Beth Medeiros RN  Outcome: Progressing  Goal: Maintain or return to baseline ADL function  Description: INTERVENTIONS:-  Assess patient's ability to carry out ADLs; assess patient's baseline for ADL function and identify physical deficits which impact ability to perform ADLs (bathing, care of mouth/teeth, toileting, grooming, dressing, etc.)- Assess/evaluate  cause of self-care deficits - Assess range of motion- Assess patient's mobility; develop plan if impaired- Assess patient's need for assistive devices and provide as appropriate- Encourage maximum independence but intervene and supervise when necessary- Involve family in performance of ADLs- Assess for home care needs following discharge - Consider OT consult to assist with ADL evaluation and planning for discharge- Provide patient education as appropriate  4/28/2025 0938 by Beth Medeiros RN  Outcome: Progressing  4/28/2025 0938 by Beth Medeiros RN  Outcome: Progressing  Goal: Maintains/Returns to pre admission functional level  Description: INTERVENTIONS:- Perform AM-PAC 6 Click Basic Mobility/ Daily Activity assessment daily.- Set and communicate daily mobility goal to care team and patient/family/caregiver.   4/28/2025 0938 by Beth Medeiros RN  Outcome: Progressing  4/28/2025 0938 by Beth Medeiros RN  Outcome: Progressing     Problem: Knowledge Deficit  Goal: Patient/family/caregiver demonstrates understanding of disease process, treatment plan, medications, and discharge instructions  Description: Complete learning assessment and assess knowledge base.Interventions:- Provide teaching at level of understanding- Provide teaching via preferred learning methods  4/28/2025 0938 by Beth Medeiros RN  Outcome: Progressing  4/28/2025 0938 by Beth Medeiros RN  Outcome: Progressing     Problem: DISCHARGE PLANNING  Goal: Discharge to home or other facility with appropriate resources  Description: INTERVENTIONS:- Identify barriers to discharge w/patient and caregiver- Arrange for needed discharge resources and transportation as appropriate- Identify discharge learning needs (meds, wound care, etc.)- Arrange for interpretive services to assist at discharge as needed- Refer to Case Management Department for coordinating discharge planning if the patient needs  post-hospital services based on physician/advanced practitioner order or complex needs related to functional status, cognitive ability, or social support system  4/28/2025 0938 by Beth Medeiros, RN  Outcome: Progressing  4/28/2025 0938 by Beth Medeiros, RN  Outcome: Progressing

## 2025-04-28 NOTE — PROGRESS NOTES
Obstetrics Progress Note  My Flannery 24 y.o. female MRN: [id number]  Unit/Bed#: -01 Encounter: [id number]    Assessment/Plan:  Postpartum Day #2 s/p . Stable. Baby in room. By issue:  Assessment & Plan   (spontaneous vaginal delivery)  PPH: 1056mL Hgb 11.3 > 10.1, asymptomatic  Routine postpartum care  Encourage ambulation  Encourage familial bonding  Lactation support as needed  Pain: Motrin/Tylenol around the clock  Voiding spontaneously  DVT Ppx: ambulation, SCDs  Mild intermittent asthma without complication  Albuterol PRN  Avoid hemabate  Iron deficiency anemia  Admission Hgb 11.3      Anticipate discharge PPD#2.    Subjective/Objective   Chief Complaint:   Post delivery     Subjective:   Pain: manageable, uncomfortable with keyes. Tolerating PO: yes. Voiding: keyes in place. Flatus: yes. Ambulating: minimal. Chest pain: no. Shortness of breath: no. Leg pain: no. Lochia: within normal limits. Infant feeding: breast.    Objective:   Vitals:   Temp:  [97.1 °F (36.2 °C)-98.1 °F (36.7 °C)] 97.1 °F (36.2 °C)  HR:  [68-90] 68  BP: ()/(49-72) 103/52  Resp:  [16-18] 16  SpO2:  [97 %-99 %] 97 %  O2 Device: None (Room air)       Intake/Output Summary (Last 24 hours) at 2025 0624  Last data filed at 2025 1800  Gross per 24 hour   Intake --   Output 1000 ml   Net -1000 ml       Lab Results   Component Value Date    WBC 15.38 (H) 2025    HGB 10.0 (L) 2025    HCT 29.5 (L) 2025    MCV 93 2025     2025       Physical Exam:   General: alert and oriented x3, in no apparent distress  Cardiovascular: regular rate and rhythm  Pulmonary: normal effort, clear to auscultation bilaterally  Abdomen: Soft, non-tender, non-distended, no rebound or guarding. Uterine fundus firm and non-tender, -1 cm below the umbilicus   Extremities: Non tender     Milka ACarissa Rep  PGY-1 OB/GYN  25  6:25 AM

## 2025-04-28 NOTE — PLAN OF CARE
Problem: POSTPARTUM  Goal: Experiences normal postpartum course  Description: INTERVENTIONS:- Monitor maternal vital signs- Assess uterine involution and lochia  2025 1150 by Beth Medeiros RN  Outcome: Adequate for Discharge  2025 by Beth Medeiros RN  Outcome: Progressing  2025 by Beth Medeiros RN  Outcome: Progressing  Goal: Appropriate maternal -  bonding  Description: INTERVENTIONS:- Identify family support- Assess for appropriate maternal/infant bonding -Encourage maternal/infant bonding opportunities- Referral to  or  as needed  2025 1150 by Beth Medeiros RN  Outcome: Adequate for Discharge  2025 by Beth Medeiros RN  Outcome: Progressing  2025 by Beth Medeiros RN  Outcome: Progressing  Goal: Establishment of infant feeding pattern  Description: INTERVENTIONS:- Assess breast/bottle feeding- Refer to lactation as needed  2025 1150 by Beth Medeiros RN  Outcome: Adequate for Discharge  2025 by Beth Medeiros RN  Outcome: Progressing  2025 by Beth Medeiros RN  Outcome: Progressing  Goal: Incision(s), wounds(s) or drain site(s) healing without S/S of infection  Description: INTERVENTIONS- Assess and document perineum and surrounding tissue- Provide patient and family education  2025 1150 by Beth Medeiros RN  Outcome: Adequate for Discharge  2025 by Beth Medeiros RN  Outcome: Progressing  2025 by Beth Medeiros RN  Outcome: Progressing     Problem: PAIN - ADULT  Goal: Verbalizes/displays adequate comfort level or baseline comfort level  Description: Interventions:- Encourage patient to monitor pain and request assistance- Assess pain using appropriate pain scale- Administer analgesics based on type and severity of pain and evaluate response- Implement  non-pharmacological measures as appropriate and evaluate response- Consider cultural and social influences on pain and pain management- Notify physician/advanced practitioner if interventions unsuccessful or patient reports new pain  4/28/2025 1150 by Beth Medeiros RN  Outcome: Adequate for Discharge  4/28/2025 0938 by Beth Medeiros RN  Outcome: Progressing  4/28/2025 0938 by Beth Medeiros RN  Outcome: Progressing     Problem: INFECTION - ADULT  Goal: Absence or prevention of progression during hospitalization  Description: INTERVENTIONS:- Assess and monitor for signs and symptoms of infection- Monitor lab/diagnostic results- Monitor all insertion sites, i.e. indwelling lines, tubes, and drains- Monitor endotracheal if appropriate and nasal secretions for changes in amount and color- [place] appropriate cooling/warming therapies per order- Administer medications as ordered- Instruct and encourage patient and family to use good hand hygiene technique- Identify and instruct in appropriate isolation precautions for identified infection/condition  4/28/2025 1150 by Beth Medeiros RN  Outcome: Adequate for Discharge  4/28/2025 0938 by Beth Medeiros RN  Outcome: Progressing  4/28/2025 0938 by Beth Medeiros RN  Outcome: Progressing  Goal: Absence of fever/infection during neutropenic period  Description: INTERVENTIONS:- Monitor WBC  4/28/2025 1150 by Beth Medeiros RN  Outcome: Adequate for Discharge  4/28/2025 0938 by Beth Medeiros RN  Outcome: Progressing  4/28/2025 0938 by Beth Medeiros RN  Outcome: Progressing     Problem: SAFETY ADULT  Goal: Patient will remain free of falls  Description: INTERVENTIONS:- Educate patient/family on patient safety including physical limitations- Instruct patient to call for assistance with activity - - Keep Call bell within reach- Keep bed low and locked with side rails adjusted as  appropriate- Keep care items and personal belongings within reach- Initiate and maintain comfort rounds  4/28/2025 1150 by Beth Medeiros RN  Outcome: Adequate for Discharge  4/28/2025 0938 by Beth Medeiros RN  Outcome: Progressing  4/28/2025 0938 by Beth Medeiros RN  Outcome: Progressing  Goal: Maintain or return to baseline ADL function  Description: INTERVENTIONS:-  Assess patient's ability to carry out ADLs; assess patient's baseline for ADL function and identify physical deficits which impact ability to perform ADLs (bathing, care of mouth/teeth, toileting, grooming, dressing, etc.)- Assess/evaluate cause of self-care deficits - Assess range of motion- Assess patient's mobility; develop plan if impaired- Assess patient's need for assistive devices and provide as appropriate- Encourage maximum independence but intervene and supervise when necessary- Involve family in performance of ADLs- Assess for home care needs following discharge - Consider OT consult to assist with ADL evaluation and planning for discharge- Provide patient education as appropriate  4/28/2025 1150 by Beth Medeiros RN  Outcome: Adequate for Discharge  4/28/2025 0938 by Beth Medeiros RN  Outcome: Progressing  4/28/2025 0938 by Beth Medeiros RN  Outcome: Progressing  Goal: Maintains/Returns to pre admission functional level  Description: INTERVENTIONS:- Perform AM-PAC 6 Click Basic Mobility/ Daily Activity assessment daily.- Set and communicate daily mobility goal to care team and patient/family/caregiver. -  4/28/2025 1150 by Beth Medeiros RN  Outcome: Adequate for Discharge  4/28/2025 0938 by Beth Medeiros RN  Outcome: Progressing  4/28/2025 0938 by Beth Medeiros RN  Outcome: Progressing     Problem: Knowledge Deficit  Goal: Patient/family/caregiver demonstrates understanding of disease process, treatment plan, medications, and discharge  instructions  Description: Complete learning assessment and assess knowledge base.Interventions:- Provide teaching at level of understanding- Provide teaching via preferred learning methods  4/28/2025 1150 by Beth Medeiros RN  Outcome: Adequate for Discharge  4/28/2025 0938 by Beth Medeiros RN  Outcome: Progressing  4/28/2025 0938 by Beth Medeiros RN  Outcome: Progressing     Problem: DISCHARGE PLANNING  Goal: Discharge to home or other facility with appropriate resources  Description: INTERVENTIONS:- Identify barriers to discharge w/patient and caregiver- Arrange for needed discharge resources and transportation as appropriate- Identify discharge learning needs (meds, wound care, etc.)- Arrange for interpretive services to assist at discharge as needed- Refer to Case Management Department for coordinating discharge planning if the patient needs post-hospital services based on physician/advanced practitioner order or complex needs related to functional status, cognitive ability, or social support system  4/28/2025 1150 by Beth Medeiros RN  Outcome: Adequate for Discharge  4/28/2025 0938 by Beth Medeiros RN  Outcome: Progressing  4/28/2025 0938 by Beth Medeiros RN  Outcome: Progressing

## 2025-04-28 NOTE — DISCHARGE INSTR - AVS FIRST PAGE
"Discharge instructions:   -Do not place anything (no partner, sex toys, tampons, douche, etc.) in your vagina for 6 weeks  -You may walk for exercise for the first 6 weeks then gradually return to your usual activities   -Please do not drive for 1 week if you have no stitches and for 2 weeks if you have stitches    -Please do not drive if you are taking any narcotic medications  -You may take baths or shower per your preference   -Please examine your breasts in the mirror daily and call your doctor for redness, tenderness, increased warmth, fevers, or chills  -Please call your doctor's office for temperature > 100.4*F or 38*C, worsening pain, increased bleeding (filling 2 or more maxi pads within 1 hr or less for at least 2 hrs), foul-smelling discharge/drainage, burning with urination, or symptoms of depression    For pain, you may take 650mg of Tylenol every 6 hours  For cramping, you may take 600mg of ibuprofen every 6 hours    You can alternate Tylenol and ibuprofen and take them \"around the clock\" to stay ahead of pain. For example, take 650mg of Tylenol at 9 AM, then 600mg of ibuprofen at 12 PM, then 650mg of Tylenol at 3 PM, and so forth.     Please take over the counter stool softener or laxative to ensure you do not strain when moving your bowels. You can take whatever is preferred (Miralax, Senna, Metamucil).    If you have any questions or concerns, please call your doctor.    "

## 2025-04-29 NOTE — UTILIZATION REVIEW
"Mother and baby discharged 2025      NOTIFICATION OF INPATIENT ADMISSION   MATERNITY/DELIVERY AUTHORIZATION REQUEST   SERVICING FACILITY:   [facility] - L&D, [place], NICU  [address] [place] [address]  Tax ID: [id number]  NPI: [id number]   ATTENDING PROVIDER:  Attending Name and NPI#: Angelina James Md [[id number]]  Address: [address] [place] [address]  Phone: [phone]   ADMISSION INFORMATION:  Place of Service: Inpatient [facility]  Place of Service Code: 21  Inpatient Admission Date/Time: 25  6:16 PM  Discharge Date/Time: 2025  3:10 PM  Admitting Diagnosis Code/Description:  38 weeks gestation of pregnancy [Z3A.38]  Uterine contractions [O47.9]  Encounter for full-term uncomplicated delivery [O80]     Mother: My Flannery 2000 Estimated Date of Delivery: 25  Delivering clinician: Angelina James   OB History          5    Para   2    Term   2       0    AB   3    Living   2         SAB   1    IAB   2    Ectopic   0    Multiple   0    Live Births   2                Name & MRN:   Information for the patient's :  Prudencio, Baby Boy (My) [[id number]]   [place] Delivery Information:  Sex: male  Delivered 2025 8:45 PM by Vaginal, Spontaneous; Gestational Age: 38w3d    [place] Measurements:  Weight: 7 lb 6 oz (3345 g);  Height: 19\"    APGAR 1 minute 5 minutes 10 minutes   Totals: 9 9       UTILIZATION REVIEW CONTACT:  Mirlande Wilkerson Utilization   Network Utilization Review Department  Phone: [phone]  Fax [phone]  Email: [email or web address]  Contact for approvals/pending authorizations, clinical reviews, and discharge.     PHYSICIAN ADVISORY SERVICES:  Medical Necessity Denial & Cdiy-fu-Aqvo Review  Phone: [phone]  Fax: [phone]  Email: [email or web address]     DISCHARGE SUPPORT TEAM:  For Patients Discharge " Needs & Updates  Phone: [phone] opt. 2 Fax: [phone]  Email: [email or web address]

## 2025-05-02 ENCOUNTER — NURSE TRIAGE (OUTPATIENT)
Age: 25
End: 2025-05-02

## 2025-05-02 NOTE — TELEPHONE ENCOUNTER
"FOLLOW UP: Message to Dr. Wakefield for review and further recommendations.    REASON FOR CONVERSATION: Postpartum Care    SYMPTOMS: Patient is 6 days post partum s/p . Reports 7/10 sharp pain along periurethral lac and stiches making it difficult to sit and get comfortable. Additionally reports a foul/fishy vaginal odor with out vaginal itching, burning, or irritation that started yesterday. Denies fever, body aches, chill and any redness, warmth, swelling, or drainage from site.     OTHER: Patient report using peribottle, dermoplast, tucks pads, and ice packs as well as tylenol and ibuprofen PRN. Advised trying tylenol/ibuprofen more regularly and interchanging the medication with staggered dosing for better control.     DISPOSITION: See PCP Within 24 Hours      Reason for Disposition   [1] Episiotomy is becoming more tender AND [2] > 48 hours since delivery    Answer Assessment - Initial Assessment Questions  1. SYMPTOM: \"What's the main symptom you're concerned about?\" (e.g., pain, swelling, stitch tightness)      Stitches near clitoris are very painful and make it difficult to sit  2. ONSET: \"When did the  s/s  start?\"      Pain worsened about 2-3 days ago  3. DELIVERY DATE: \"When was your delivery date?\"        4. PAIN: \"Is there any pain?\" If Yes, ask: \"How bad is it?\" (Scale: 1-10; mild, moderate, severe)      7/10 sharp pain  5. FEVER: \"Do you have a fever?\" If Yes, ask: \"What is your temperature, how was it measured, and when did it start?\"      Denies fever, body aches, chills  6. OTHER SYMPTOMS: \"Do you have any other symptoms?\" (e.g., abdominal pain, vaginal discharge, pain with urination)      Report vaginal odor - foul/fishy.  Denies urinary concerns.    Protocols used: Postpartum - Episiotomy Symptoms-ADULT-AH    "

## 2025-05-03 LAB — PLACENTA IN STORAGE: NORMAL

## 2025-05-05 ENCOUNTER — NURSE TRIAGE (OUTPATIENT)
Age: 25
End: 2025-05-05

## 2025-05-05 ENCOUNTER — POSTPARTUM VISIT (OUTPATIENT)
Dept: OBGYN CLINIC | Facility: CLINIC | Age: 25
End: 2025-05-05
Payer: COMMERCIAL

## 2025-05-05 VITALS
SYSTOLIC BLOOD PRESSURE: 102 MMHG | WEIGHT: 154 LBS | DIASTOLIC BLOOD PRESSURE: 60 MMHG | TEMPERATURE: 102.5 F | BODY MASS INDEX: 28.17 KG/M2

## 2025-05-05 DIAGNOSIS — N61.0 MASTITIS: Primary | ICD-10-CM

## 2025-05-05 PROCEDURE — 99213 OFFICE O/P EST LOW 20 MIN: CPT | Performed by: NURSE PRACTITIONER

## 2025-05-05 RX ORDER — DICLOXACILLIN SODIUM 500 MG/1
500 CAPSULE ORAL 4 TIMES DAILY
Qty: 40 CAPSULE | Refills: 0 | Status: SHIPPED | OUTPATIENT
Start: 2025-05-05 | End: 2025-05-05 | Stop reason: SDUPTHER

## 2025-05-05 RX ORDER — DICLOXACILLIN SODIUM 500 MG/1
500 CAPSULE ORAL 4 TIMES DAILY
Qty: 40 CAPSULE | Refills: 0 | Status: SHIPPED | OUTPATIENT
Start: 2025-05-05 | End: 2025-05-15

## 2025-05-05 NOTE — PATIENT INSTRUCTIONS
"Patient Education     Common breastfeeding problems   The Basics   Written by the doctors and editors at [facility]   What problems can happen during breastfeeding? -- Many people are able to breastfeed with no problems at all. But sometimes, problems happen. Most problems can be treated so that you can keep breastfeeding. Breastfeeding has many benefits for both you and your baby.  Some common breastfeeding problems and their treatments are discussed below. Many treatments are things that you can do on your own, although some problems require help from a doctor or nurse.  You might also find it helpful to work with a breastfeeding expert, called a \"lactation consultant,\" if you have problems.  Engorgement -- \"Engorgement\" is the term doctors use for when the breasts are too full of milk. If your breasts are engorged, they might feel swollen, hard, warm, and painful. Also, your baby might have trouble with \"latch-on.\" Latch-on is another word for when a baby makes a tight seal with their mouth around the nipple and the dark skin around the nipple (areola) (figure 1).  If your baby is able to latch on, breastfeeding will remove milk from the breast and help with engorgement. If not, you can use your hand or a breast pump to let a little bit of milk out between feedings (figure 2). If you use a pump, you can use it for just a few minutes right before a feeding. This will soften your breast without releasing too much milk, which can make engorgement worse.  You can also try the following home remedies to reduce the pain:   Use a cold pack or cool cloth on your breasts between feedings.   Take a pain-relieving medicine, such as acetaminophen (sample brand name: Tylenol) or ibuprofen (sample brand names: Advil, Motrin).   Take a warm shower.   Gently massage your breasts to start your milk flow.   Wear a bra that supports your breasts.  Mastitis -- Mastitis is when part of the breast becomes inflamed and swollen. It is " "similar to engorgement, but usually affects 1 breast. The swelling in the breast puts pressure on the milk ducts. This makes the ducts narrower, so milk cannot flow to the nipple as easily.  The goal of treatment is to ease discomfort and get the milk flowing again. To do this, you can try the steps listed above for relieving symptoms of engorgement. In addition:   Feed your baby when they are hungry. If milk is flowing from the breast with mastitis, you can feed your baby on that side.   Only use a breast pump if you need to. Do not try to empty your breast completely, as this can make mastitis worse.   Avoid using nipple shields.   Drink plenty of fluids, and rest when possible.  If milk flow is blocked for more than a day or 2, this can lead to bacterial infection in the breast. This can cause additional symptoms like fever, aches, or feeling very tired. This needs treatment with antibiotics. If you have an infection, continue to breastfeed. You will not pass the infection on to your baby.  Sore or painful nipples -- The most important thing that you can do to prevent and deal with nipple pain is to make sure that your baby latches on the right way (figure 1). You might feel a tugging or pulling at your nipples while breastfeeding, which is normal. But if you feel pain or rubbing, take the baby's mouth off of the breast, then have them latch on again.  Nipple pain that lasts for a whole breastfeeding session is not normal. It can be caused by nipple cracks, blisters, or bruises. Sometimes, nipple pain and problems latching on are caused by a condition called \"tongue tie,\" which is when the baby's tongue cannot move as freely as it should.  You can also try the following home remedies:   If your nipples are cracked or raw, you can rub a small amount of breast milk on them or try lanolin ointment (sample brand name: Lansinoh). If you think that your nipple might be infected, call your doctor or nurse. Do not use " "vitamin E or honey on your nipples, because these can be dangerous for your baby.   Hold a cool or warm washcloth on your nipples.   Take a mild pain reliever, such as acetaminophen (sample brand name: Tylenol) or ibuprofen (sample brand names: Advil, Motrin).   Wear breast pads between feedings to protect your nipples.   When your baby gets older and starts to get teeth, they might sometimes bite your nipple while breastfeeding. If this happens, try to position the baby so that their mouth is wide open during feedings. That will make it harder to bite. If your baby does bite you, try sticking your finger between your nipple and the baby's mouth and firmly saying \"no.\" Then, put the baby down in a safe place. This will help your baby learn not to bite. You can also offer a teething ring to chew on instead.  Blocked milk ducts -- A blocked milk duct can cause a painful breast lump (picture 1). The skin might look red. A blocked duct can also cause a white plug at the end of the nipple.  If you have a blocked milk duct, try to breastfeed often. Make sure that your baby latches on the right way (figure 1) and empties your breasts during feedings. Start with the breast that has the blocked milk duct, and use different breastfeeding positions to try to get the breasts as empty as possible. To help your milk flow better, you can also try taking a warm shower or gently massaging the breast. If your baby doesn't empty your breast, you can use your hand or a breast pump to remove more milk after the feeding.  You should not stop breastfeeding because of a blocked milk duct. Stopping can make the problem worse.  Nipple color changes -- The nipples can turn white, blue, or red, and be painful. This is more likely to happen if you are very sensitive to cold. It can also happen if your nipple is injured (for example, if your baby doesn't have a good latch-on).  To treat this, you can:   Turn up the room temperature, and wear " "warm clothes.   Put a warm cloth over your breasts before and after breastfeeding.  It's also a good idea to avoid things that make this problem worse. For example:   Avoid caffeine.   Avoid nicotine (smoking or vaping).   Do not take certain medicines that might make this problem worse. This includes some medicines for colds or migraine headaches, medicines used to treat attention deficit hyperactivity disorder (\"ADHD\"), and some diet pills. Ask your doctor if you're not sure about a particular medicine.  Should I see a doctor or nurse? -- Talk with your doctor or nurse if you have problems with breastfeeding. Let them know if you have:   Pain that lasts for the whole breastfeeding session   Blood leaking from the nipples   A blocked milk duct that does not get better after 3 days   A fever of 100.4°F (38°C) or higher and a hard, red, or swollen area of the breast   Flu-like symptoms, including muscle aches, chills, or feeling very tired   Worries that you are not making enough milk  You can also talk to a lactation consultant (breastfeeding expert) for help.  All topics are updated as new evidence becomes available and our peer review process is complete.  This topic retrieved from [employer] on: Feb 28, 2024.  Topic 66148 Version 11.0  Release: 32.2.4 - C32.58  © 2024 UpToDate, Inc. and/or its affiliates. All rights reserved.  figure 1: Latch-on     During latch-on, a baby's mouth forms a tight seal around the nipple and most of the areola (the dark skin around the nipple). Signs that your baby has a good latch-on include:  The top and bottom lips are wide open (like a big yawn).  The lower lip is turned outward against the breast.  The chin is touching the breast, and the nose is close to the breast.  The cheeks are full.  The tongue comes out over the lower lip during latch-on and stays below the areola during nursing.  You and your baby are completely comfortable during breastfeeding. You may feel some tugging " and pressure, but no rubbing of your nipple or pain.  Graphic 20540 Version 10.0  figure 2: Hand method to release breast milk     Hold your hand in a c-shape, with your thumb on top. Press your thumb gently on your breast straight back into the chest. Then, roll your thumb and fingers toward the nipple. Breast milk should come out of the nipple. Keep doing this as you move your hand around the whole breast.  Graphic 14434 Version 6.0  picture 1: Blocked milk duct (galactocele)     Blocked or plugged ducts are areas of the breast where the flow of milk is blocked. They can stretch the nearby breast tissue and cause a painful breast lump (as shown by the arrow).  Graphic 17445 Version 6.0  Consumer Information Use and Disclaimer   Disclaimer: This generalized information is a limited summary of diagnosis, treatment, and/or medication information. It is not meant to be comprehensive and should be used as a tool to help the user understand and/or assess potential diagnostic and treatment options. It does NOT include all information about conditions, treatments, medications, side effects, or risks that may apply to a specific patient. It is not intended to be medical advice or a substitute for the medical advice, diagnosis, or treatment of a health care provider based on the health care provider's examination and assessment of a patient's specific and unique circumstances. Patients must speak with a health care provider for complete information about their health, medical questions, and treatment options, including any risks or benefits regarding use of medications. This information does not endorse any treatments or medications as safe, effective, or approved for treating a specific patient. UpToDate, Inc. and its affiliates disclaim any warranty or liability relating to this information or the use thereof.The use of this information is governed by the Terms of Use, available at  https://www.woltersVenustechuwer.com/en/know/clinical-effectiveness-terms. 2024© [employer] and its affiliates and/or licensors. All rights reserved.  Copyright   © 2024 [employer] and/or its affiliates. All rights reserved.

## 2025-05-05 NOTE — PROGRESS NOTES
My Flannery  2000       Gender: male   Apgars: A1 - 9 A5 -9  Weight: 7lb 6oz  Her lochia has resolved.    She is Breastfeeding without problems.   She denies postpartum blues/depression.  Her EPDS is 0.    Substance use screen:    Last Pap: 10/17/2024

## 2025-05-05 NOTE — PROGRESS NOTES
Name: My Flannery      : 2000      MRN: [id number]  Encounter Provider: ANGIE Freitas  Encounter Date: 2025   Encounter department: [facility]  :  Assessment & Plan  Mastitis  Encouraged to continue alternating ibuprofen and acetaminophen.  Ice to the breast as needed  Good support bra  Call if no improvement in the next day or 2.  Encouraged to continue breast-feeding on both breasts.  Orders:    dicloxacillin (DYNAPEN) 500 MG capsule; Take 1 capsule (500 mg total) by mouth 4 (four) times a day for 10 days        History of Present Illness   HPI  My Flannery is a 24 y.o. female who presents   for breast complaints.  She is s/p Uncomplicated vaginal delivery on 25 .  She started developing breast pain and fevers yesterday.  She states she had mastitis with her first pregnancy also.  This feels the same.  She is still breast-feeding on both breasts.  Her temperature was 102.4 today.  She says she was 104.0 yesterday.  She denies any upper respiratory issues, sore throat, pelvic pain, dysuria, vaginal discharge or any other issues.      Review of Systems   Constitutional:  Positive for chills and fever. Negative for fatigue and unexpected weight change.   Respiratory:  Negative for shortness of breath.    Gastrointestinal:  Negative for anal bleeding, blood in stool, constipation and diarrhea.   Genitourinary:  Negative for difficulty urinating, dysuria and hematuria.   Neurological:  Negative for weakness.   Psychiatric/Behavioral:  Negative for agitation, behavioral problems and confusion.           Objective   /60 (BP Location: Left arm, Patient Position: Sitting, Cuff Size: Standard)   Temp (!) 102.5 °F (39.2 °C) (Temporal)   Wt 69.9 kg (154 lb)   LMP 2024 (Exact Date)   BMI 28.17 kg/m²      Physical Exam  Constitutional:       General: She is not in acute distress.     Appearance: She is well-developed.   Pulmonary:       Effort: Pulmonary effort is normal.   Chest:   Breasts:     Estrada Score is 5.      Breasts are symmetrical.      Right: No inverted nipple, mass, nipple discharge, skin change or tenderness.      Left: Tenderness present. No inverted nipple, mass, nipple discharge or skin change.          Comments: Painful area noted as above.  No redness, skin irritation or skin changes noted.  Patient febrile today  Neurological:      Mental Status: She is alert.   Psychiatric:         Behavior: Behavior is cooperative.

## 2025-05-10 ENCOUNTER — NURSE TRIAGE (OUTPATIENT)
Dept: OTHER | Facility: OTHER | Age: 25
End: 2025-05-10

## 2025-05-10 RX ORDER — IBUPROFEN 600 MG/1
600 TABLET, FILM COATED ORAL EVERY 6 HOURS PRN
Qty: 28 TABLET | Refills: 0 | Status: SHIPPED | OUTPATIENT
Start: 2025-05-10 | End: 2025-05-17

## 2025-05-10 RX ORDER — ACETAMINOPHEN 325 MG/1
650 TABLET ORAL EVERY 4 HOURS PRN
Qty: 42 TABLET | Refills: 0 | Status: SHIPPED | OUTPATIENT
Start: 2025-05-10 | End: 2025-05-17

## 2025-05-10 NOTE — TELEPHONE ENCOUNTER
"Regarding: tylenol/motrin  ----- Message from Sarah ELIZALDE sent at 5/10/2025  1:31 PM EDT -----  \"I need a refill on my tylenol and motrin. I only have enough for today and it is the only thing helping me with the mastitis\"    "

## 2025-05-10 NOTE — TELEPHONE ENCOUNTER
"FOLLOW UP: none needed    REASON FOR CONVERSATION: Medication Problem    SYMPTOMS: breast pain, fever    OTHER: My was diagnosed with mastitis on 5/5. She took the dynapen two times on 5/5. then stopped taking it because she didn't think it was helping. she restarted it yesterday. she has had 3 doses since yesterday. she is feeling better. she had a fever of 103 this morning. she wants to know if the tylenol and motrin can be refilled?     Per on call provider- anyibiotics need to be finished out the full course as prescribed from here on out and fine to refill ibuprofen and Tylenol     DISPOSITION: Call PCP Now          Reason for Disposition   [1] Caller has URGENT medicine question about med that PCP or specialist prescribed AND [2] triager unable to answer question    Answer Assessment - Initial Assessment Questions  1. NAME of MEDICINE: \"What medicine(s) are you calling about?\"      Tylenol and motrin    2. QUESTION: \"What is your question?\" (e.g., double dose of medicine, side effect)        3. PRESCRIBER: \"Who prescribed the medicine?\" Reason: if prescribed by specialist, call should be referred to that group.      OB  4. SYMPTOMS: \"Do you have any symptoms?\" If Yes, ask: \"What symptoms are you having?\"  \"How bad are the symptoms (e.g., mild, moderate, severe)      Pt stopped taking the dynapen but restarted it and is now feeling better.      Still had a fever this morning of 103    Started dynapen 5/5 and took two doses. Then stopped taking it until yesterday. Has had 3 doses in last 24 hours.    Protocols used: Medication Question Call-Adult-    "

## 2025-05-12 RX ORDER — ACETAMINOPHEN 325 MG/1
650 TABLET ORAL EVERY 4 HOURS PRN
Qty: 30 TABLET | Refills: 0 | OUTPATIENT
Start: 2025-05-12

## 2025-05-12 RX ORDER — IBUPROFEN 600 MG/1
600 TABLET, FILM COATED ORAL EVERY 6 HOURS PRN
Qty: 30 TABLET | Refills: 0 | OUTPATIENT
Start: 2025-05-12

## 2025-05-21 ENCOUNTER — TELEPHONE (OUTPATIENT)
Dept: OBGYN CLINIC | Facility: CLINIC | Age: 25
End: 2025-05-21

## 2025-05-21 NOTE — TELEPHONE ENCOUNTER
Patient was called for PP follow up.  Left a voicemail.  Patient was reminded that she can message this nurse via My Chart and If  having any symptoms or concerns to Please call [phone].    TAMMY Pittman  OB Nurse Navigator

## 2025-05-27 ENCOUNTER — TELEPHONE (OUTPATIENT)
Age: 25
End: 2025-05-27

## 2025-05-27 PROBLEM — O41.8X10 SUBCHORIONIC HEMATOMA IN FIRST TRIMESTER: Status: RESOLVED | Noted: 2024-10-01 | Resolved: 2025-05-27

## 2025-05-27 PROBLEM — O26.879 SHORT CERVIX AFFECTING PREGNANCY: Status: RESOLVED | Noted: 2024-12-30 | Resolved: 2025-05-27

## 2025-05-27 PROBLEM — O46.8X1 SUBCHORIONIC HEMATOMA IN FIRST TRIMESTER: Status: RESOLVED | Noted: 2024-10-01 | Resolved: 2025-05-27

## 2025-05-27 PROBLEM — Z37.9 NORMAL LABOR: Status: RESOLVED | Noted: 2025-04-26 | Resolved: 2025-05-27

## 2025-05-27 PROBLEM — J45.909 ASTHMA AFFECTING PREGNANCY IN SECOND TRIMESTER: Status: RESOLVED | Noted: 2024-11-29 | Resolved: 2025-05-27

## 2025-05-27 PROBLEM — O99.512 ASTHMA AFFECTING PREGNANCY IN SECOND TRIMESTER: Status: RESOLVED | Noted: 2024-11-29 | Resolved: 2025-05-27

## 2025-05-27 NOTE — TELEPHONE ENCOUNTER
Patient unable to make 5/27/25 post partum.    Patient able to be rescheduled to [facility] or [facility].

## 2025-05-28 ENCOUNTER — TELEPHONE (OUTPATIENT)
Dept: OBGYN CLINIC | Facility: CLINIC | Age: 25
End: 2025-05-28

## 2025-05-28 NOTE — PATIENT INSTRUCTIONS
"Patient Education     Intrauterine devices (IUDs)   The Basics   Written by the doctors and editors at [facility]   What is an intrauterine device? -- An intrauterine device (\"IUD\") is a type of birth control. It is a small, T-shaped device. A doctor or nurse puts an IUD in your uterus by going through your vagina and cervix (figure 1).  IUDs are made of flexible plastic and have 2 thin plastic strings that hang out of the cervix. They are very small, a little more than 1 inch (2.5 cm) in width and length.  An IUD is one of the safest, most effective methods for preventing pregnancy. It is a good choice for people, including teens, who do not want to get pregnant for at least 1 year. An IUD can also be used to prevent pregnancy if it is put in within 5 days after you have unprotected sex. This is known as \"emergency contraception.\"  You can also use IUDs for reasons other than birth control. For example, 1 type of IUD can be used to treat heavy, painful periods.  What are the different types of IUDs? -- There are 2 categories of IUDs available in the . One type contains copper. The other type contains a hormone called \"levonorgestrel\" (figure 2):   Copper IUD - There is only 1 copper-containing IUD (brand name: Paragard). It can stay in your uterus for 10 years, or longer for some people, to prevent pregnancy. Some people who use it get heavier or longer periods than they had before getting the IUD.   Hormonal IUDs - There are 4 hormone-containing IUDs (brand names: Mirena, Liletta, Kyleena, Neisha) (figure 2). Depending on which of these you have, it can stay in your uterus for up to 8, 5, or 3 years. Many people who use hormonal IUDs have lighter, less painful periods than they had before getting the IUD. Some people stop getting a period at all, but this is not harmful. After having the IUD removed, periods usually return to normal within a month or 2.  Other types of IUDs are also available outside of the " "[place].  What are the benefits of using an IUD? -- The benefits of using an IUD include:   IUDs are very effective. Fewer than 1 in 100 people who use an IUD get pregnant during the first year of use.   You do not have to remember to do anything or take any birth control medicines on a regular basis.   IUDs have few side effects.   IUDs do not contain estrogen, a hormone that some people can't or don't want to take.   If you decide you want to get pregnant, you can have the IUD taken out.   If you use an IUD for several years, it costs less overall than many other types of birth control. That's because there are no costs after you have it inserted.   There is evidence that using an IUD lowers your risk of getting cervical cancer.  What are the downsides of using an IUD? -- The downsides of using an IUD include:   Unlike condoms, an IUD does not protect you against infections that you can catch during sex. These are called \"sexually transmitted infections\" (\"STIs\") or \"sexually transmitted diseases.\" But you and your partner(s) can use condoms to prevent spreading infections.   There is a small chance that the IUD will come out during your period. If this happens, you will need to get a new IUD. If you see your IUD in your underwear, on your pad, or in the toilet, call your doctor or nurse.   The initial cost is higher than the cost of other methods. But, there are no more costs after it is inserted.   Only a doctor or nurse can insert or remove an IUD.  You should not get an IUD if you recently had an infection that spread to your uterus and other nearby organs. This is called a \"pelvic infection.\" STIs such as chlamydia and gonorrhea can cause pelvic infections.  Which type of IUD is best for me? -- Your nurse or doctor can talk to you about the options and help you choose the right IUD for you.  A copper IUD might be a good choice if you:   Want or need to avoid hormones   Want to avoid big changes in your period, " "such as not having any periods or bleeding or spotting between periods   Want birth control for up to 10 years, or possibly longer  A hormonal IUD might be a good choice if you:   Have heavy, painful periods. These IUDs can make your periods lighter and less painful.   Have pelvic pain from a condition called \"endometriosis.\" These IUDs might help reduce the pain.   Want birth control for up to 8 years, depending on which device you choose  Does it hurt to have an IUD put in? -- You will likely feel some discomfort and slight cramping after the nurse or doctor puts the IUD in your uterus. People who have never given birth might feel more discomfort than people who have. The cramps generally go away within a day or 2. Over-the-counter pain medicines like ibuprofen (sample brand names: Advil, Motrin) or naproxen (sample brand name: Aleve) can help cramps go away faster.  After the IUD is in place, you should not be able to feel it.  Should I see a doctor or nurse? -- If you have an IUD, see your doctor or nurse right away if:   You have bad pain in your lower belly.   Your period is late or very different from normal.   You cannot feel the string of the IUD or if the string seems shorter than usual.   You think your IUD might have moved or fallen out.   You had sex with someone who has or might have an STI, or you think you have an STI.   You have an unexplained fever.  All topics are updated as new evidence becomes available and our peer review process is complete.  This topic retrieved from [employer] on: Feb 26, 2024.  Topic 63581 Version 21.0  Release: 32.2.4 - C32.56  © 2024 UpToDate, Inc. and/or its affiliates. All rights reserved.  figure 1: Female reproductive anatomy     The internal organs that make up the female reproductive system are located in the lower belly. These include the uterus, fallopian tubes, ovaries, cervix, and vagina.  The uterus has an inner lining, called the \"endometrium,\" and a thicker " "outer layer, called the \"myometrium.\"  Graphic 17615 Version 8.0  figure 2: IUDs     This picture shows 2 types of IUDs. There are different IUDs available. They are placed inside the uterus to help prevent pregnancy. Some hormonal IUDs can help reduce menstrual bleeding. The copper IUD can actually make menstrual bleeding heavier.  Graphic 88168 Version 15.0  Consumer Information Use and Disclaimer   Disclaimer: This generalized information is a limited summary of diagnosis, treatment, and/or medication information. It is not meant to be comprehensive and should be used as a tool to help the user understand and/or assess potential diagnostic and treatment options. It does NOT include all information about conditions, treatments, medications, side effects, or risks that may apply to a specific patient. It is not intended to be medical advice or a substitute for the medical advice, diagnosis, or treatment of a health care provider based on the health care provider's examination and assessment of a patient's specific and unique circumstances. Patients must speak with a health care provider for complete information about their health, medical questions, and treatment options, including any risks or benefits regarding use of medications. This information does not endorse any treatments or medications as safe, effective, or approved for treating a specific patient. UpToDate, Inc. and its affiliates disclaim any warranty or liability relating to this information or the use thereof.The use of this information is governed by the Terms of Use, available at https://www.[employer].com/en/know/clinical-effectiveness-terms. 2024© UpToDate, Inc. and its affiliates and/or licensors. All rights reserved.  Copyright   © 2024 UpToDate, Inc. and/or its affiliates. All rights reserved.      Patient Education     Vaginal Delivery Discharge Instructions   About this topic   A vaginal birth means that your baby was born through the " vagina. How fast you get well will depend on many things. One is if you have had a surgical cut in the area between your vaginal opening and your anus. A cut in this area is an episiotomy. Sometimes you tear naturally during childbirth. This can also affect your healing time. How long your labor was and how much rest you get can also impact your healing. Most of the time, your body takes 6 to 8 weeks to heal from childbirth.     What care is needed at home?   Ask your doctor what you need to do when you go home. Make sure you ask questions if you do not understand what you need to do.  Use a well-fitting bra for support.  Use sanitary pads. Ask your doctor when you may use tampons or douche.  Take a sitz bath 3 to 4 times each day. Sit in 2 to 3 inches (5 to 7.5 cm) of warm water in the tub for 10 to 15 minutes each time. Carefully wipe your bottom afterwards.  Place an ice pack or a bag of frozen peas wrapped in a towel over the painful part. Never put ice right on the skin. Do not leave the ice on more than 10 to 15 minutes at a time.  Take your drugs as ordered.  If breastfeeding, wash your breasts daily with water to keep them clean. Air dry nipples after each feeding.  You may feel sad, weepy, and overwhelmed. Your hormones are changing and you are sleeping less. This can affect your emotions. These feelings are normal. Be patient with yourself. If your sadness lasts more than 2 weeks, contact your doctor. Don’t wait until your postpartum visit.  What follow-up care is needed?   Your doctor may ask you to make visits to the office to check on your progress. Be sure to keep your visits. Ask your doctor if it is okay to be active. Also ask about having sex and birth control.  What drugs may be needed?   Your doctor may give you a drug for pain. Over-the-counter (OTC) pain drugs often work just fine. Ask your doctor what to take if you are breastfeeding.  Will physical activity be limited?   You may have to limit  your activity. Talk to your doctor about the right amount of activity for you. Ask your doctor when you may safely:  Drive  Have sex  Lift heavy objects  Climb stairs  Work out  Do not lift older children. Let older children climb into your lap.  What problems could happen?   Bleeding  Uterine infection  Tear of tissue around the vagina  Blood clots in your legs  Infection in your breasts  Depression  When do I need to call the doctor?   Signs of infection such as a fever of 100.4°F (38°C) or higher, chills, pain with passing urine, or wound that will not heal.  Sudden shortness of breath or a sudden onset of chest pain could be a sign that a blood clot has traveled to your lungs. Go to the ER right away.  Signs of wound infection such as swelling, redness, warmth around the wound; too much pain when touched; yellowish, greenish, or bloody discharge; foul smell coming from the cut site; cut site opens up.  Problems with pain that does not go away, or gets worse.  Swollen, hard, or painful breasts with a fever of 100.4°F or higher.  Feeling very sad or low mood.  Problems with your urine or bowel movements.  Sudden, large amounts of vaginal bleeding.  Helpful tips   Take a nap when your baby sleeps.  Do something that can help you relax like reading books or listening to music.  Make time for you and your partner to be alone and talk.  Make time for you and your partner to enjoy your baby.  Breastfeeding is good for the baby and for you.  Eat a well-balanced diet and drink plenty of fluids.  Teach Back: Helping You Understand   The Teach Back Method helps you understand the information we are giving you. After you talk with the staff, tell them in your own words what you learned. This helps to make sure the staff has described each thing clearly. It also helps to explain things that may have been confusing. Before going home, make sure you can do these:  I can tell you about my procedure.  I can tell you what may  help ease my pain.  I can tell you what I will do if I have large amounts of vaginal bleeding, swollen or painful breasts with a fever, or feel very sad or have a low mood.  Last Reviewed Date   2020-10-13  Consumer Information Use and Disclaimer   This generalized information is a limited summary of diagnosis, treatment, and/or medication information. It is not meant to be comprehensive and should be used as a tool to help the user understand and/or assess potential diagnostic and treatment options. It does NOT include all information about conditions, treatments, medications, side effects, or risks that may apply to a specific patient. It is not intended to be medical advice or a substitute for the medical advice, diagnosis, or treatment of a health care provider based on the health care provider's examination and assessment of a patient’s specific and unique circumstances. Patients must speak with a health care provider for complete information about their health, medical questions, and treatment options, including any risks or benefits regarding use of medications. This information does not endorse any treatments or medications as safe, effective, or approved for treating a specific patient. UpToDate, Inc. and its affiliates disclaim any warranty or liability relating to this information or the use thereof. The use of this information is governed by the Terms of Use, available at https://www.[employer]uwer.com/en/know/clinical-effectiveness-terms   Copyright   Copyright ©  UpToDate, Inc. and its affiliates and/or licensors. All rights reserved.  Patient Education     Normal Bleeding After Having a Baby   About this topic   After you have a baby, even if you have a , you will have some vaginal bleeding and discharge. This is called lochia. Your body needs to get rid of the extra blood and tissue it made to grow and nourish your baby while you were pregnant.  You may have more bleeding and it may be  brighter red for the first couple of days after you have your baby, similar to your period. Some women even pass small clots, but they shouldn’t be bigger than a quarter. You may notice more bleeding if you are very active or when you first stand up and this is normal. You may also notice more bleeding after breastfeeding. When you breastfeed, your womb tightens or contracts.  After about 10 days, the bleeding should slow down. You may have some light bleeding, discharge, or spotting for about 6 weeks. You may notice the color changes to more of a pink or brown and then to yellow, white, or clear.  What care is needed at home?   Ask your doctor what you need to do when you go home. Make sure you ask questions if you do not understand everything the doctor says.  Try to sit or lay down to rest for a little while if you notice extra bleeding, especially right after you have given birth.  Use a sanitary pad while you are bleeding or having discharge. You can change to a panty liner as the discharge gets less.  Talk with your doctor before you use tampons.  What follow-up care is needed?   Your doctor may ask you to come back to the office to check on your progress. Be sure to keep these visits.  What drugs may be needed?   The doctor may order drugs to:  Help with pain  Will physical activity be limited?   Rest for the first few days after you have a baby. Do not lift, push, or pull things over 10 pounds (4.5 kg). Ask your doctor how much exercise is OK.  Talk to your doctor about when it is safe to have sex.  What problems could happen?   Infection  Too much bleeding  When do I need to call the doctor?   Signs of infection. These include a fever of 100.4°F (38°C) or higher, chills, pain with passing urine, abdominal tenderness, or foul smelling vaginal discharge.  Bleeding that soaks more than 1 pad in an hour or passing blood clots larger than a quarter. These could be signs of too much bleeding from your  uterus.  Teach Back: Helping You Understand   The Teach Back Method helps you understand the information we are giving you. After you talk with the staff, tell them in your own words what you learned. This helps to make sure the staff has described each thing clearly. It also helps to explain things that may have been confusing. Before going home, make sure you can do these:  I can tell you about my condition.  I can tell you what I will do if I notice heavier bleeding right after giving birth.  I can tell you what I will do if I soak 1 or more sanitary pads in an hour.  Last Reviewed Date   2020-10-08  Consumer Information Use and Disclaimer   This generalized information is a limited summary of diagnosis, treatment, and/or medication information. It is not meant to be comprehensive and should be used as a tool to help the user understand and/or assess potential diagnostic and treatment options. It does NOT include all information about conditions, treatments, medications, side effects, or risks that may apply to a specific patient. It is not intended to be medical advice or a substitute for the medical advice, diagnosis, or treatment of a health care provider based on the health care provider's examination and assessment of a patient’s specific and unique circumstances. Patients must speak with a health care provider for complete information about their health, medical questions, and treatment options, including any risks or benefits regarding use of medications. This information does not endorse any treatments or medications as safe, effective, or approved for treating a specific patient. UpToDate, Inc. and its affiliates disclaim any warranty or liability relating to this information or the use thereof. The use of this information is governed by the Terms of Use, available at https://www.[employer].com/en/know/clinical-effectiveness-terms   Copyright   Copyright © 2024 UpToDate, Inc. and its affiliates and/or  licensors. All rights reserved.  Patient Education     Taking Care of Yourself After You Have a Baby   About this topic   It is important to take care of yourself after you have a baby. This will help you cope with the stress of having a new baby. Parents who are well rested often feel like they are better able to care for their baby.  General   New moms, and dads too, often get less sleep after the baby is born and the sleep they do get is often interrupted. When you do not get enough good sleep, you are more likely to:  Be irritable  Have problems thinking and concentrating  Become depressed  Be less alert for tasks like driving  Forget important things like appointments  It is also important for new parents to take a few minutes each day to take care of themselves. It can be hard to find a few minutes to put yourself first. When you do, you will find that you are better able to take care of your baby and family.  What lifestyle changes are needed?   Ask for help. When others offer to help, say yes. Use that time to take a nap or to do something nice for yourself. Take a long shower. Paint your nails. Go for a walk. Have a cup of coffee with a friend.  Remember it is healthy to take time for yourself. When you allow other trusted friends or relatives to watch your baby, they get a chance to bond with your baby. You get a chance to get away for a short time and focus on other things.  Most often, after a little time away from your baby, you will come back refreshed and recharged.  Take time to connect with your partner as well. Being a parent is hard, but it is easier if your partner is involved.  Make sleep a priority. Take a nap when the baby sleeps. The dishes or housework can wait. Sleep is important to your overall health and to your mental health as well.  Work out a schedule with a partner or family member. Try to share nighttime care.  If you are having trouble sleeping at night, try these tips to help you  sleep:  Spend some time in bright light or sunlight during the day. Avoid bright lights and TV, phone, and computer screens at night.  Avoid hard exercise, coffee, and heavy meals for a few hours before bedtime.  Keep your bedroom dark, cool, and quiet.  If you cannot fall asleep, get up and do a light activity until you feel sleepy. Try to read, write in a journal, or take deep and relaxing breaths.  When do I need to call the doctor?   If you have any thoughts of hurting yourself or someone else  If you have depression and it gets worse or is not getting better  If your problems with sleeping continue  If you are not able to do your normal tasks  Last Reviewed Date   2020-01-14  Consumer Information Use and Disclaimer   This generalized information is a limited summary of diagnosis, treatment, and/or medication information. It is not meant to be comprehensive and should be used as a tool to help the user understand and/or assess potential diagnostic and treatment options. It does NOT include all information about conditions, treatments, medications, side effects, or risks that may apply to a specific patient. It is not intended to be medical advice or a substitute for the medical advice, diagnosis, or treatment of a health care provider based on the health care provider's examination and assessment of a patient’s specific and unique circumstances. Patients must speak with a health care provider for complete information about their health, medical questions, and treatment options, including any risks or benefits regarding use of medications. This information does not endorse any treatments or medications as safe, effective, or approved for treating a specific patient. UpToDate, Inc. and its affiliates disclaim any warranty or liability relating to this information or the use thereof. The use of this information is governed by the Terms of Use, available at  "https://www.[employer]wer.com/en/know/clinical-effectiveness-terms   Copyright   Copyright ©  UpToDate, Inc. and its affiliates and/or licensors. All rights reserved.  Patient Education     Caring for your    The Basics   Written by the doctors and editors at [facility]   How will I know how to care for my ? -- \"[place]\" is what a baby is called for the first 4 weeks of life. In the hospital, the doctors and nurses will help you learn how to care for your . They will answer your questions and make sure that you know what to do when you go home. Some hospitals offer a class on  care.  This article has general tips for caring for a healthy . Babies that were born premature, or \",\" often need other special care.  How does a healthy  act? -- In the days and weeks after birth, your baby will probably:   Keep their body curled up, the way they were inside of the uterus   Sleep a lot   Need to be fed at least every few hours  What should I know about caring for my ? -- People make different choices about how to care for their baby. But there are some things that everyone should do for safety reasons. These include:   Handling the baby - When picking up or holding your , support their body, especially their head and neck. Be gentle, and never shake a baby. When you put your baby down, make sure that they are in a safe place such as a crib, cradle, or bassinet.   Sleep - Always put your baby on their back on a flat surface to sleep. They should sleep in a crib, cradle, or bassinet without any pillows, blankets, or other objects in it (figure 1). The mattress should be firm, not soft. If you want your baby to sleep near you, put the crib or bassinet near your bed (figure 2).   Temperature - Dress your  in clothing that will keep them from getting too hot or too cold. If their hands or feet feel cold, cover them with mittens or socks.   Travel - If you " "have a car, make sure that you have an infant car seat that has not  and is installed correctly. It's also important to make sure that the car seat straps are at the right height and that your baby is securely buckled in.  If you need to travel anywhere with your , bring supplies with you so that you are prepared. This includes diapers, wipes, extra clothes, and formula if you use it.   Preventing the spread of germs - Anyone who holds or touches your  should wash their hands first. This will help protect them from infections while their immune system is still developing.  You will also need to learn the basics of:   Feeding - Feed your  when they show signs of being hungry. Signs include waking up from sleep, moving their head around, or sucking on their hands, lips, or tongue. Most newborns need to eat about 8 to 12 times a day. Burp your  gently after each feed.   Diapering - Check your 's diaper often, and change it when it is wet or dirty. This will help prevent diaper rash. When you change your baby:   Wash your hands before and after.   Always lay them on a flat, stable surface.   Never leave the baby alone.   Use baby wipes or a wet cloth to gently clean their skin.   Use diaper cream or ointment if their skin is irritated.   Make sure that the diaper is the right size and is not too tight.  If your  was circumcised, follow all instructions on how to care for the area as it heals.   Caring for the umbilical cord - There will be a \"stump\" where the umbilical cord was cut. It will dry up and fall off on its own, usually within a week or 2 after birth.  While the stump is still attached, keep it clean and dry. It can help to fold the front of the diaper down so it does not cover the stump. Do not pull on the stump. Do not put anything on it, like rubbing alcohol or ointment.   Bathing - Newborns do not need to be bathed every day. You can give sponge baths until " "the umbilical cord stump falls off. For a sponge bath, keep your baby covered with a towel to stay warm. Uncover 1 part of their body at a time, and use a washcloth and warm (not hot) water to clean them.  If possible, have another caregiver help you when you bathe your . Never leave a baby alone in or near water.   Soothing and comforting - When your  cries, they might be hungry or need a diaper change. But it's also normal for babies to cry for no obvious reason. To soothe your baby, you can try:   Holding or rocking them   Putting them in a baby carrier or wrap that you wear   Swaddling them (figure 3)   Making a \"shushing\" sound or using a white noise machine   Putting them in a car seat and going for a drive  How do I care for myself? -- Taking care of a  is a lot of work. It's normal to be tired during this time. You can take care of yourself by:   Resting and sleeping when you can   Eating healthy foods and drinking plenty of water   Having other people help when possible  When should I call the doctor? -- Call for advice right away if your baby:   Is not eating normally   Is unusually sleepy or hard to wake   Has severe or worsening jaundice (when the skin or white part of the eye turns yellow)   Seems to be working harder than normal to breathe   Turns blue in the face, skin, lips, fingernails, or toenails   Has a fever of 100.4°F (38°C) or higher   Does not have a wet diaper for 8 hours or longer   Spits up a lot   Has blood in their diaper   Cries for longer than 2 hours without stopping   Has redness or oozing around the umbilical cord stump  Call the doctor if your baby's umbilical cord stump does not fall off after 3 weeks.  You should also call for help if you are struggling to take care of or feed your baby.  All topics are updated as new evidence becomes available and our peer review process is complete.  This topic retrieved from [employer] on: May 15, 2024.  Topic 698335 Version " "7.0  Release: 32.4.3 - C32.134  © 2024 UpToDate, Inc. and/or its affiliates. All rights reserved.  figure 1: Putting your baby to sleep safely     Always put your babyon their back on a flat surface to sleep. The crib or bassinet should nothave any pillows, blankets, or other objects in it. The mattress should befirm, not soft.  Graphic 659556 Version 1.0  figure 2: Safe room-sharing     If you want your babyto sleep near you, put their crib or bassinet next to your bed. Do notput the baby in bed with you. Always put your baby on their back to sleep.  Graphic 624529 Version 1.0  figure 3: Steps to safely swaddle a baby     Swaddling a baby can help stop crying or fussing. To swaddle a baby:  Put the baby on a large blanket that has the top corner folded down (A).  Bring the left arm down (B). Wrap the cloth over the arm and chest, and tuck it under the right side of the baby (C).  Bring the right arm down. Wrap the cloth over the baby's arm and chest, and tuck it under the left side of the baby (D).  Twist or fold the bottom end of the cloth, and tuck it behind the baby (E, F). Make sure both legs are bent up and out, so the hips can move.  For safety:  The baby's neck and head should be completely uncovered.  Tuck the blanket snugly enough that it stays in place while your baby is sleeping. There should be no loose blankets or bedding in the crib, since this increases the risk of suffocation or SIDS (also called \"crib death\").  Make sure that there is room for the baby to bend their hips or knees. When babies are swaddled too tightly, it can cause problems in the hip joint.  Always put your baby to sleep on their back.   Do not place a loose blanket or anything else on top of your baby after swaddling.  Do not swaddle your baby once they start trying to roll over.  Graphic 89607 Version 9.0  Consumer Information Use and Disclaimer   Disclaimer: This generalized information is a limited summary of diagnosis, " "treatment, and/or medication information. It is not meant to be comprehensive and should be used as a tool to help the user understand and/or assess potential diagnostic and treatment options. It does NOT include all information about conditions, treatments, medications, side effects, or risks that may apply to a specific patient. It is not intended to be medical advice or a substitute for the medical advice, diagnosis, or treatment of a health care provider based on the health care provider's examination and assessment of a patient's specific and unique circumstances. Patients must speak with a health care provider for complete information about their health, medical questions, and treatment options, including any risks or benefits regarding use of medications. This information does not endorse any treatments or medications as safe, effective, or approved for treating a specific patient. UpToDate, Inc. and its affiliates disclaim any warranty or liability relating to this information or the use thereof.The use of this information is governed by the Terms of Use, available at https://www.[employer].com/en/know/clinical-effectiveness-terms. 2024© UpToDate, Inc. and its affiliates and/or licensors. All rights reserved.  Copyright   © 2024 UpToDate, Inc. and/or its affiliates. All rights reserved.  Patient Education     Breastfeeding   The Basics   Written by the doctors and editors at [employer]   Do doctors recommend breastfeeding for most babies? -- Yes. Doctors recommend breastfeeding your baby for at least 1 year (12 months) if possible. For the first 6 months, breast milk is the only food that a baby needs. Most babies start eating other foods, in addition to breast milk, when they are 4 to 6 months old.  Breastfeeding has many benefits for both you and your baby. \"Exclusive\" breastfeeding means that your baby only drinks breast milk, and no formula or water. Any amount of breastfeeding is good for your baby. " "But exclusive breastfeeding gives the most benefits.  When should I start breastfeeding? -- If possible, it's best to start breastfeeding immediately after giving birth, ideally within the first hour. During this time, most babies are awake and want to breastfeed. Also, the \"skin-to-skin contact\" can help your baby learn to breastfeed.  After you give birth, hormone changes in your body cause your breasts to fill with milk (figure 1). For the first few days after birth, you will make only a small amount of yellowish milk. This is called \"colostrum.\" Colostrum has all of the nutrition that a  needs. You will start making more breast milk a few days later, usually 2 to 3 days after giving birth.  Breastfeeding frequently will help your body make more milk. If you want to keep breastfeeding, try not to give your baby any formula unless there is a medical reason. \"Supplementing\" with formula can make it harder for your body to make enough milk.  How should I hold my baby during breastfeeding? -- There are different ways that you can hold your baby during breastfeeding (figure 2). You can try different positions to figure out which way works best for you and your baby.  Some people choose to wear a special bra called a \"nursing bra.\" The cups of the bra have special flaps that open. This way, you can breastfeed your baby without having to take the bra off. Wearing a nursing bra might also give your breasts extra support.  What does \"latch-on\" mean? -- Latch-on is another word for when a baby makes a tight seal with their mouth around the nipple and the areola (the dark skin around the nipple) (figure 3). A good latch-on helps the baby get enough milk and helps protect your nipples. You might feel a tugging or pulling at your nipples. If you feel pain or rubbing, you should take the baby's mouth off of your breast, then have them latch on again.  How often should I breastfeed, and how long should a feeding last? -- " You should breastfeed when your baby shows signs of being hungry. A baby can show that they are hungry by:   Waking up from sleep   Moving their head around as if they are looking for the breast   Sucking on their hands, lips, or tongue  Babies can breastfeed on different schedules and for different amounts of time. For example, some babies finish a feeding in 5 minutes, but others might take 20 minutes or longer.  babies feed about 8 to 12 times each day.  It's a good idea to let the baby finish breastfeeding on 1 side so that they get all of the milk from that breast. Then, you can see if your baby wants to drink from your other breast. The next time you breastfeed, try to remember which breast your baby started on the last time, and start on the other side. Switching the side you start with each time will help both your breasts continue to make milk.  How do I know if my baby is getting enough breast milk? -- You can tell if your baby is getting enough breast milk by:   Checking their diapers - By day 4 or 5 after birth, babies should have at least 4 or 5 wet diapers a day.   Checking their bowel movements - Newborns should have at least 1 bowel movement every day. By day 5, they usually start to have more bowel movements, and their bowel movements should be yellow.   Having your doctor or nurse check to see if your baby is gaining weight  Does my baby need any other food or drink? -- For the first 6 months, most babies need only breast milk.  Babies who breastfeed also need to take extra vitamin D. This comes as a liquid that you give your baby using a dropper. Your baby's doctor can talk to you about how to do this and how much vitamin D to give. Vitamin D is important to build strong bones.  When a baby is between 4 and 6 months old, they can start eating and drinking other things, too. Ask your doctor or nurse which foods to feed your baby and when. You can still continue to breastfeed after your baby  "starts eating solid foods.  What problems can happen during breastfeeding? -- Some people have problems during breastfeeding, like:   Swollen, hard, and painful breasts   Painful or cracked nipples   Breast or nipple infections   Blocked milk ducts, which can cause red and painful breast lumps  How are breastfeeding problems treated? -- Breastfeeding problems are treated in different ways. Treatment will depend on the problem. For example, if you have swollen, hard, and painful breasts, you might feel better if you:   Use your hand or a breast pump to let some milk out (figure 4).   Use an ice pack or take pain-relieving medicine to treat the pain.   Take a warm shower to start your milk flow and let some milk out.  Talk with your doctor or nurse if you are having problems with breastfeeding. Almost all breastfeeding problems can be treated. Some people also find it helpful to talk with a breastfeeding expert, called a \"lactation consultant.\"  Should I see a doctor or nurse? -- Call your doctor or nurse if you have:   Pain that lasts for the whole breastfeeding session   Blood leaking from the nipples   A blocked milk duct that does not get better   A fever of 100.4°F (38°C) or higher and a hard, red, or swollen area of the breast   Flu-like symptoms, including muscle aches, chills, or feeling very tired   Worries that you are not making enough milk  Do I need to change how I eat or drink when I am breastfeeding? -- Maybe. You should eat a healthy diet and drink plenty of liquids. If you are trying to lose weight, breastfeeding might help. If you have a restrictive diet (for example, if you avoid meat or animal products), talk to your doctor or nurse. They might suggest taking iron or certain vitamins while you are breastfeeding to make sure that you get the nutrients you need.  If you choose to drink alcohol, most doctors recommend having only an occasional drink that has the amount of alcohol found in 1 glass of " "wine. They also recommend waiting 2 hours after having a drink before you breastfeed.  Can I keep taking my medicines while I am breastfeeding? -- Some medicines are not safe to take while you are breastfeeding. But in most cases, it's possible to keep taking the medicines you need, or switch to different medicines. If you take any medicines, tell your doctor or nurse. They can make sure that those medicines are safe to take when breastfeeding.  Illegal or recreational drugs are harmful for a breastfeeding baby. Cannabis (marijuana) is legal in some places, but should be avoided if you are breastfeeding. There are studies that suggest it could cause problems in babies.  When should I stop breastfeeding? -- If possible, it is a good idea to keep breastfeeding until your baby is at least 1 year old. But people choose to stop breastfeeding at different times and for different reasons.  When you do decide to stop, doctors recommend doing it gradually. You can drop 1 feeding every 2 to 5 days, or breastfeed for a shorter time each feeding. This will make it easier for your body to adjust. It will also give your baby time to get used to breastfeeding less.  All topics are updated as new evidence becomes available and our peer review process is complete.  This topic retrieved from [employer] on: Feb 28, 2024.  Topic 25385 Version 16.0  Release: 32.2.4 - C32.58  © 2024 UpToDate, Inc. and/or its affiliates. All rights reserved.  figure 1: The breast     Whena person is breastfeeding, special glands in the breasts make milk. From there,the milk flows into the \"milk ducts\" and out through the nipple. The nipple has multiple openings for milk to flow through.  Graphic 01516 Version 6.0  figure 2: Comfortable positions for breastfeeding     These drawings show different ways that you can hold your baby for breastfeeding. In each of these positions, the baby is positioned so that the nipple is high in the mouth, so that more areola " is seen near the baby's nose than lower jaw.  Graphic 50398 Version 9.0  figure 3: Latch-on     During latch-on, a baby's mouth forms a tight seal around the nipple and most of the areola (the dark skin around the nipple). Signs that your baby has a good latch-on include:  The top and bottom lips are wide open (like a big yawn).  The lower lip is turned outward against the breast.  The chin is touching the breast, and the nose is close to the breast.  The cheeks are full.  The tongue comes out over the lower lip during latch-on and stays below the areola during nursing.  You and your baby are completely comfortable during breastfeeding. You may feel some tugging and pressure, but no rubbing of your nipple or pain.  Graphic 84204 Version 10.0  figure 4: Hand method to release breast milk     Hold your hand in a c-shape, with your thumb on top. Press your thumb gently on your breast straight back into the chest. Then, roll your thumb and fingers toward the nipple. Breast milk should come out of the nipple. Keep doing this as you move your hand around the whole breast.  Graphic 82051 Version 6.0  Consumer Information Use and Disclaimer   Disclaimer: This generalized information is a limited summary of diagnosis, treatment, and/or medication information. It is not meant to be comprehensive and should be used as a tool to help the user understand and/or assess potential diagnostic and treatment options. It does NOT include all information about conditions, treatments, medications, side effects, or risks that may apply to a specific patient. It is not intended to be medical advice or a substitute for the medical advice, diagnosis, or treatment of a health care provider based on the health care provider's examination and assessment of a patient's specific and unique circumstances. Patients must speak with a health care provider for complete information about their health, medical questions, and treatment options, including  "any risks or benefits regarding use of medications. This information does not endorse any treatments or medications as safe, effective, or approved for treating a specific patient. UpToDate, Inc. and its affiliates disclaim any warranty or liability relating to this information or the use thereof.The use of this information is governed by the Terms of Use, available at https://www.[employer].com/en/know/clinical-effectiveness-terms. 2024© UpToDate, Inc. and its affiliates and/or licensors. All rights reserved.  Copyright   © 2024 UpToDate, Inc. and/or its affiliates. All rights reserved.  Patient Education     Mastitis   The Basics   Written by the doctors and editors at [employer]   What is mastitis? -- Mastitis is when part of the breast becomes inflamed and swollen.  Mastitis often happens in people who are breastfeeding. The medical term for this is \"lactational mastitis.\" It is most common during the first few months of breastfeeding. But it can happen at any time.  Swelling in the breast can make it harder for milk to flow. The goal of treatment is to ease discomfort and get the milk flowing again. If milk flow stays blocked, mastitis can lead to infection.  What is engorgement? -- When a person is breastfeeding, special glands in the breasts make milk. From there, the milk flows into the \"milk ducts\" and out through the nipple (figure 1).  \"Engorgement\" is swelling that can happen in the tissue around the ducts. This typically happens when the milk first comes in, within a few days of birth. It makes both breasts full and painful. If your body makes more milk than your baby eats, this can also lead to engorgement.  Any time engorgement happens, it is relieved by breastfeeding regularly. Make sure that the baby has a good \"latch\" on the nipple.  What causes mastitis? -- Mastitis is similar to engorgement, but usually affects 1 breast. The swelling in the breast puts pressure on the milk ducts. This makes the " "ducts narrower, so milk cannot flow to the nipple as easily.  You might be more likely to get mastitis if:   Your baby is not able to remove enough milk when feeding.   You have an injury to your nipple, like cracked skin or a blister.   You have \"oversupply,\" meaning that your body makes more milk than your baby eats.  What are the symptoms of mastitis? -- The early symptoms are similar to breast engorgement, but often only affect 1 breast. They include:   A hard, red, or swollen area of 1 breast   Breast pain or tenderness  If milk flow is blocked for more than a day or 2, this can lead to bacterial infection. Breast milk normally contains bacteria. This type of bacteria is not harmful for a baby. But if it builds up in your breast, it can cause an infection. This is called \"bacterial mastitis.\"  In addition to breast redness, swelling, and pain, bacterial mastitis can cause:   Fever or chills   Muscle aches   Tiredness  Will I need tests? -- It depends. Your doctor or nurse will ask about your symptoms and do an exam. This can usually tell them if you have mastitis.  In certain cases, doctors might do tests. This is more likely if you have severe symptoms or an infection that is not getting better with antibiotics. Tests might include:   Tests on a sample of breast milk - If you have an infection, this can show what kind of bacteria caused it. In some cases, this can help doctors decide what antibiotic to prescribe.   Ultrasound - This is a type of imaging test. It creates pictures of the inside of the breast. An ultrasound can show if you have a breast abscess. (An abscess is a collection of pus that can form if infection is not treated.)  Is there anything I can do on my own to feel better? -- Yes.  You do not need to stop breastfeeding if you have mastitis. Regular breastfeeding is actually the best way to help with swelling and keep milk from getting blocked again. Even if you have an infection, you will not " pass it on to your baby.  To help relieve symptoms, you can:   Feed your baby when they are hungry. If milk is flowing from the breast with mastitis, you can feed your baby on that side.   Only use a breast pump if you need to. Do not try to empty your breast completely, as this can make mastitis worse.   Avoid using nipple shields.   Hold a cold compress on your breast. This can help with both pain and swelling. Some people find that a warm, wet cloth on the breast feels better than cold. Use whichever feels better.   Take acetaminophen (sample brand name: Tylenol) or ibuprofen (sample brand names: Advil, Motrin) to help with pain or fever. Do not take aspirin.   Gently massage your breast to help unblock the milk ducts. Start at the part of the breast that is swollen or sore, and stroke gently toward your nipple. This can help improve milk flow.   Wear a bra that is supportive and fits well, but is not too tight.   Drink plenty of fluids.   Rest when possible.  How is bacterial mastitis treated? -- Mastitis does not always lead to infection. But when it does, the infection is treated with antibiotics. This usually involves taking antibiotic pills for at least 5 days. Some people need to take them for up to 2 weeks.  If your infection is severe, you might need treatment in the hospital. This involves getting antibiotics through an IV, which is a thin tube that goes into a vein.  Can mastitis be prevented? -- Sometimes. To lower the risk of getting mastitis:   Breastfeed your baby regularly.   If you use a breast pump, do not over-pump. Do not try to empty the breast completely.   Work with a lactation consultant if you are having trouble breastfeeding. They can help with things like nipple pain, problems with your baby's latch, or milk supply issues.   When you are ready to stop breastfeeding, do this gradually. Do not stop breastfeeding all at once.  When should I call the doctor? -- Call for advice if:   Your  "symptoms do not get better after 1 to 2 days.   You start having symptoms of infection, such as fever, muscle aches, or feeling very tired.   You are struggling with breastfeeding.  You can also talk to a lactation consultant (breastfeeding expert) for help.  All topics are updated as new evidence becomes available and our peer review process is complete.  This topic retrieved from [employer] on: Feb 26, 2024.  Topic 377748 Version 1.0  Release: 32.2.4 - C32.56  © 2024 UpToDate, Inc. and/or its affiliates. All rights reserved.  figure 1: The breast     Whena person is breastfeeding, special glands in the breasts make milk. From there,the milk flows into the \"milk ducts\" and out through the nipple. The nipple has multiple openings for milk to flow through.  Graphic 14907 Version 6.0  Consumer Information Use and Disclaimer   Disclaimer: This generalized information is a limited summary of diagnosis, treatment, and/or medication information. It is not meant to be comprehensive and should be used as a tool to help the user understand and/or assess potential diagnostic and treatment options. It does NOT include all information about conditions, treatments, medications, side effects, or risks that may apply to a specific patient. It is not intended to be medical advice or a substitute for the medical advice, diagnosis, or treatment of a health care provider based on the health care provider's examination and assessment of a patient's specific and unique circumstances. Patients must speak with a health care provider for complete information about their health, medical questions, and treatment options, including any risks or benefits regarding use of medications. This information does not endorse any treatments or medications as safe, effective, or approved for treating a specific patient. UpToDate, Inc. and its affiliates disclaim any warranty or liability relating to this information or the use thereof.The use of this " "information is governed by the Terms of Use, available at https://www.Krakenuwer.com/en/know/clinical-effectiveness-terms. © UpToDate, Inc. and its affiliates and/or licensors. All rights reserved.  Copyright   ©  UpToDate, Inc. and/or its affiliates. All rights reserved.  Patient Education     Depression during and after pregnancy   The Basics   Written by the doctors and editors at [facility]   What is depression? -- This is a disorder that makes you sad, but it is different than normal sadness.  For many people, pregnancy is a happy time. But some people have depression while they are pregnant or after they have their baby. Doctors use different terms for this:    depression - This is depression during pregnancy. \"\" means the period of time before a baby is born.   Postpartum depression - This is a kind of depression that some people get after having a baby. \"Postpartum\" means the period of time shortly after giving birth.  Depression can make it hard to eat, sleep, work, or enjoy life. It can also make it hard to care for yourself and your baby or other children.  What causes depression? -- It is caused by problems with chemicals in the brain called \"neurotransmitters.\" Some people might be more likely to have depression if it runs in their family. Other things might also play a role, including hormones, certain health problems, medicines, stress, being mistreated as a child, family problems, and problems with friends or at work.  It's not exactly clear what causes some people to have depression during or after pregnancy. It is more likely in people who had depression in the past.  Get help right away if you are thinking of hurting or killing yourself! -- Sometimes, people with depression think of hurting or killing themselves. If you ever feel like you might hurt yourself or your baby, help is available:   In the [place], contact the 038 Suicide & Crisis Lifeline:   To speak to someone, " "call or text 988.   To talk to someone online, go to www.[facility].org/chat.   Call your doctor or nurse, and tell them that it is an emergency.   Call for an ambulance (in the [place] and [place], call ).   Go to the emergency department at your local hospital.  If you think that your partner might have depression, or if you are worried that they might hurt themselves, get them help right away.  What are the symptoms of  depression? -- With  depression, symptoms start during pregnancy.  Depression can make you feel sad, down, hopeless, or cranky most of the day, almost every day. Another common sign of depression is no longer enjoying or caring about things that you used to like. Other symptoms might include trouble sleeping, not having the energy for normal daily tasks, not gaining enough weight, or feeling restless, worthless, or guilty.  What are the symptoms of postpartum depression? -- It can be hard to tell if someone has postpartum depression, since some of the symptoms might also be caused by the stress of taking care of a . For example, after having a new baby, it's normal to:   Sleep too much or too little   Feel tired or lack energy   Have changes in your appetite, weight, or desire to have sex  But a person with postpartum depression might not be able to sleep even when their baby sleeps. Or they might have so little energy that they cannot get out of bed for hours.  They might also feel:   Anxious, irritable, and angry   Guilty or overwhelmed   Unable to care for their baby   Like a failure as a parent  What are \"postpartum blues\"? -- After having a baby, many people get a mild type of postpartum depression called \"postpartum blues.\" Within 2 or 3 days after giving birth, people with postpartum blues might:   Be richard, irritable, or anxious   Have trouble concentrating or sleeping   Have crying spells  With postpartum blues, these symptoms are not severe and usually go away " "within 2 weeks. But in people with postpartum depression, the symptoms are more severe. They last at least 2 weeks, and often longer.  How is depression treated? -- The 2 main treatments are:   Medicines - Medicines called \"antidepressants\" are often used to treat depression.  A newer medicine called zuranolone (brand name: Zurzuvae) might also be an option for treating postpartum depression in some situations. It comes as a pill you take every day for 2 weeks. It is not used during pregnancy.   Psychotherapy (counseling) - This involves meeting with a therapist to talk about your feelings, thoughts, and behavior. There are different types of psychotherapy. For example, one type involves working on improving your relationships. Another type involves changing your thinking and behavior patterns to help you cope.  The options for treatment depend on how severe your symptoms are and whether you had depression before pregnancy. If you need treatment with medicine, your doctor will help you decide what to do based on your situation:   If you are pregnant, your doctor will talk to you about your options. Many people take antidepressant medicines during pregnancy.   If you are breastfeeding, you might need to avoid certain medicines. That's because small amounts of medicine can get into your breast milk. For some medicines, this can be unhealthy for your baby. But not treating depression can also be harmful for both you and your baby, and there are many medicines for depression that do not seem to harm babies. Your doctor can help you decide if you need medicine and the best one to take.  Take care of yourself by getting plenty of rest and finding healthy ways to cope with stress. Accept help from other people when possible. It can also help to move your body. Even gentle forms of exercise, like walking, are good for your health.  Is there any way to prevent depression during or after pregnancy? -- Maybe. If you have had " depression before, you are more likely to get it again during or after pregnancy. If you are at risk for depression, your doctor might recommend talking to a therapist.  If you had postpartum depression before and took a medicine that helped, your doctor might prescribe that medicine to take again after the next time you give birth.  All topics are updated as new evidence becomes available and our peer review process is complete.  This topic retrieved from [employer]Q on: Apr 03, 2024.  Topic 56127 Version 18.0  Release: 32.2.4 - C32.92  © 2024 UpToDate, Inc. and/or its affiliates. All rights reserved.  Consumer Information Use and Disclaimer   Disclaimer: This generalized information is a limited summary of diagnosis, treatment, and/or medication information. It is not meant to be comprehensive and should be used as a tool to help the user understand and/or assess potential diagnostic and treatment options. It does NOT include all information about conditions, treatments, medications, side effects, or risks that may apply to a specific patient. It is not intended to be medical advice or a substitute for the medical advice, diagnosis, or treatment of a health care provider based on the health care provider's examination and assessment of a patient's specific and unique circumstances. Patients must speak with a health care provider for complete information about their health, medical questions, and treatment options, including any risks or benefits regarding use of medications. This information does not endorse any treatments or medications as safe, effective, or approved for treating a specific patient. UpToDate, Inc. and its affiliates disclaim any warranty or liability relating to this information or the use thereof.The use of this information is governed by the Terms of Use, available at https://www.wolterskluwer.com/en/know/clinical-effectiveness-terms. 2024© UpToDate, Inc. and its affiliates and/or licensors. All  rights reserved.  Copyright   © 2024 UpToDate, Inc. and/or its affiliates. All rights reserved.  Patient Education     Pelvic Floor Exercises   About this topic   The pelvic floor consists of muscles and strong bands of tissues which support all of the organs in your pelvis. Some of these organs are the bladder and the small and large bowel as well as the womb and the prostate. If the muscles and tissues get weak, your organs may drop. This can lead to other problems. Your urine may leak when you laugh, sneeze, or cough. You may not be able to drain the bladder fully. You may have less problems if you do exercises to strengthen your pelvic floor and abdominal muscles.  Kegel exercises help make the muscles in the pelvic floor stronger. Anyone can do them. It is also important to make your abdominal muscles stronger. In order for these exercises to work, you must be consistent when doing them.  General   Before starting with a program, ask your doctor if you are healthy enough to do these exercises. Your doctor may have you work with a  or physical therapist to make a safe exercise program to meet your needs.  Strengthening Exercises   Kegel exercises keep your pelvic muscles firm and strong. You can do these in many different positions. Start by lying down with your knees bent and feet on the bed. Squeeze the pelvic muscles as if you are trying to stop the flow of urine. Hold these muscles for a count of 3, and then slowly relax them for a count of 3. Try to work up to squeezing for a count of 10 and slowly relaxing for a count of 10. Increase the muscle squeeze until you get to 10. When relaxing, slowly relax to a count of 10.  Breathe out when you are squeezing and breathe in when you are relaxing. Your goal is to try to do 10 Kegels 3 or more times each day. Take time to rest between sets. Be sure to only contract your pelvic floor muscles, not your buttocks, thighs, or abdominal muscles.  Pelvic floor  contractions ? There are a few ways to feel the pelvic muscles contract.  When you are passing urine, try suddenly stopping your flow of urine. Do not do this on a regular basis, but only to feel what the contraction feels like. Doing this while passing urine can lead to other problems.  Put a finger into the vagina or rectum. Contract the muscles around your finger as if you were trying to stop the flow of urine or stop the passing of gas.  Place two chairs without arms about 2 inches (5 cm) apart. Sit so you have one butt cheek on each chair. Now, try marifer your pelvic floor muscles. This will help you keep from using other muscles.  Pelvic tilts ? Lie on your back with your knees bent and feet flat on the floor. Tighten your stomach muscles and press your lower back down to the floor. Try doing Kegels with this exercise when your back is flattened. Hold 3 to 5 seconds. Relax.  Straight leg raises lying down ? Lie on your back with one leg straight. Bend your other knee so the foot is flat on the bed. Keeping your leg straight, lift the leg up to the level of your other knee. Lower it back down. Repeat with the other leg.  Hip lifts ? Lie on your back with your knees bent and feet flat on the floor. Tighten your stomach muscles and lift your buttocks off the floor. Try doing Kegels when up in this position. Hold 3 to 5 seconds. Relax.  Abdominal bracing ? Do this exercise in different positions: Lying down, sitting, and standing. Tighten your stomach muscles. While keeping the stomach muscles tight, tighten your pelvic floor muscles. Now, forcefully laugh or cough to see if you were able to prevent urine from leaking.  Abdominal crunches ? Lie on your back with both knees bent. Keep your feet flat on the floor. Place your hands in one of these positions. Try starting with the first position since it is the easiest. As you get better, use the other positions to make it harder.  Crunches with arms at  sides.  Crunches with arms across chest.  Crunches with arms behind head. Be careful not to interlock your fingers behind your neck or head while doing crunches. This may add tension to your neck and cause strain.  Look at the ceiling. Tighten your belly muscles and lift your shoulders and upper back off the floor. Breathe out while you are doing this. Lower your shoulders to the floor. Breathe in while you are doing this. Relax your belly muscles all the way before starting another crunch.             What will the results be?   Less leakage of urine when you cough, sneeze, laugh, or run  Fewer strong urges to pass urine  Fewer trips to the bathroom each day  Less risk of organs, such as the uterus or bladder, dropping into the vagina  Faster recovery after childbirth or prostate surgery  Stronger core muscles  Increased sensitivity during sex  Helpful tips   You can also try doing a different kind of Kegels. Do 5 quick, strong pelvic floor contractions. Sometimes, if you have an urge to pass urine but are not near a bathroom, you can do this kind of Kegel to calm the urge.  Stay active and work out to keep your muscles strong and flexible.  Keep a healthy weight to avoid putting too much stress on your spine. Eat a healthy diet to keep your muscles healthy.  Be sure you do not hold your breath when exercising. This can raise your blood pressure. If you tend to hold your breath, try counting out loud when exercising. If any exercise bothers you, stop right away.  Try walking or cycling at an easy pace for a few minutes to warm up your muscles. Do this again after exercising.  Doing exercises before a meal may be a good way to get into a routine. A good time to do these exercises is each time you are stopped at a stop light while driving.  Exercise may be slightly uncomfortable, but you should not have sharp pains. If you do get sharp pains, stop what you are doing. If the sharp pains continue, call your  doctor.  Last Reviewed Date   2021-03-31  Consumer Information Use and Disclaimer   This generalized information is a limited summary of diagnosis, treatment, and/or medication information. It is not meant to be comprehensive and should be used as a tool to help the user understand and/or assess potential diagnostic and treatment options. It does NOT include all information about conditions, treatments, medications, side effects, or risks that may apply to a specific patient. It is not intended to be medical advice or a substitute for the medical advice, diagnosis, or treatment of a health care provider based on the health care provider's examination and assessment of a patient’s specific and unique circumstances. Patients must speak with a health care provider for complete information about their health, medical questions, and treatment options, including any risks or benefits regarding use of medications. This information does not endorse any treatments or medications as safe, effective, or approved for treating a specific patient. UpToDate, Inc. and its affiliates disclaim any warranty or liability relating to this information or the use thereof. The use of this information is governed by the Terms of Use, available at https://www.woltersNomorerack.comuwer.com/en/know/clinical-effectiveness-terms   Copyright   Copyright © 2024 UpToDate, Inc. and its affiliates and/or licensors. All rights reserved.

## 2025-05-28 NOTE — PROGRESS NOTES
Diagnoses and all orders for this visit:    Postpartum exam    Counseling for birth control regarding intrauterine device (IUD)    We have reviewed mechanism of action, benefits, risks, side effects of LARC's & IUD placement.  Advised to schedule for IUD insertion.  Patient has been advised to avoid intercourse for 2 weeks prior to insertion,  patient has been advised to take 600 mg of ibuprofen with food 1 hour prior to the appointment to help diminish cramping post placement.      May advance activities as tolerated  May resume sexual activity at your discretion   Perineal hygiene reviewed   Weight bearing exercises minium of 150 mins/weekly advised.   Kegel exercises recommended.   Reviewed  rest, daily exercise and nutrition recommendations.   Continue with PNV.  Gardisil vaccines recommended up to age 45  Advised to call with any issues,  all concerns & questions addressed.     Follow up for annual exam in 3 months   See AVS for further educational information    My Flannery is a 24 y.o.  presents for routine postpartum visit.   She is s/p  @ 38.3 on 25 . Right periurethral laceration extending to clitoral hughes, Baby Boy, Apgar's 9/9  Prenatal and intrapartum course was complicated by   Problem List[1]  She denies abn bleeding, Lochia Alba scant. Denies pelvic pain, breast complaints, bowel/bladder dysfunction, depression/anx.   Denies resumption of sexual activity  Baby is thriving. breast feeding. EPDS score 3 Good support.     She expresses interest in starting IUD for contraception. Reviewed risks/benefits, advised to schedule for insertion   Discussed risks of short interval pregnancies.     Gardasil: Completed      Past Medical History[2]  Past Surgical History[3]    Immunization History   Administered Date(s) Administered    DTaP 2002, 2002, 2002, 2003, 2006    HPV Quadrivalent 2016    HPV9 2016, 11/15/2017    Hep A, ped/adol, 2 dose 2008,  "2009    Hep B, Adolescent or Pediatric 2002, 2002, 2002    Hib (PRP-T) 2002, 2002, 2003    INFLUENZA 11/15/2017, 2018    IPV 2002, 2002, 2002, 2006    MMR 2002, 2006    Meningococcal B, OMV (BEXSERO) 2018, 2019    Meningococcal MCV4, Unspecified 2016, 10/26/2018    Meningococcal MCV4P 2016, 10/26/2018    Pneumococcal Conjugate PCV 7 2002, 2002, 2002, 2003    Tdap 2016    Varicella 2002, 2008       Family History[4]  Social History[5]    Current Medications[6]    Patient Active Problem List    Diagnosis Date Noted    Anemia during pregnancy in third trimester 2025    Positive test for herpes simplex virus antibody 2025    Iron deficiency anemia 2025     (spontaneous vaginal delivery) 2025    Vitamin D deficiency 2023    Mild intermittent asthma without complication 11/15/2017     Allergies   Allergen Reactions    Pollen Extract Eye Swelling     Itch throat, runny nose and eye watering        OB History    Para Term  AB Living   5 2 2 0 3 2   SAB IAB Ectopic Multiple Live Births   1 2 0 0 2      # Outcome Date GA Lbr Chetan/2nd Weight Sex Type Anes PTL Lv   5 Term 26 38w3d / 00:04 3345 g (7 lb 6 oz) M Vag-Spont Local N ELIAS   4 SAB      Biochemical      3 IAB  10w0d          2 IAB  8w0d          1 Term 21 40w2d  3260 g (7 lb 3 oz) F Vag-Spont None  ELIAS       Vitals:    25 1047   BP: 110/68   BP Location: Left arm   Patient Position: Sitting   Cuff Size: Standard   Weight: 67.6 kg (149 lb)   Height: 5' 2\" (1.575 m)     Body mass index is 27.25 kg/m².      Review of Systems     Constitutional: Negative for chills, fatigue, fever, headaches, visual disturbances, and unexpected weight change.   Respiratory: Negative for cough, & shortness of breath.  Cardiovascular: Negative for chest pain. .  "   Gastrointestinal: Negative for Abd pain, nausea & vomiting, constipation and diarrhea.   Genitourinary: Negative for difficulty urinating, dysuria, hematuria, unusual vaginal bleeding or discharge  Skin: Negative skin changes    Physical Exam     Constitutional: Alert & Oriented x3, well-developed and well-nourished. No distress.   HENT: Atraumatic, Normocephalic, Conjunctivae clear  Neck: Normal range of motion.  Pulmonary: Effort normal.   Abdominal: Soft. No tenderness or masses  Musculoskeletal: Normal ROM  Skin: Warm & Dry  Psychological: Normal mood, thought content, behavior & judgement     Breasts: Patient Reported   Right: tissue soft without  tenderness, skin changes  No areas of erythema or pain.   Left:  tissue soft without  tenderness, skin changes  No areas of erythema or pain.     Pelvic exam was performed with patient supine, lithotomy position.      Labia: Right periurethral laceration extending to clitoral hughes, healing well , sutures remain   Urethral meatus:  Negative for  tenderness, inflammation or discharge.   Uterus: not deviated, enlarged, fixed or tender.   Cervix: No CMT, no discharge or friability.   Right adnexa: no mass, no tenderness and no fullness.  Left adnexa: no mass, no tenderness and no fullness.   Vagina: No erythema, tenderness, masses, or foreign body in the vagina. . No unusual vaginal discharge   Perineum without lesions, erythema or swelling.  Inguinal Canal:        Right: No inguinal adenopathy or hernia present.        Left: No inguinal adenopathy or hernia present.                   [1]   Patient Active Problem List  Diagnosis    Mild intermittent asthma without complication    Vitamin D deficiency     (spontaneous vaginal delivery)    Anemia during pregnancy in third trimester    Positive test for herpes simplex virus antibody    Iron deficiency anemia   [2]   Past Medical History:  Diagnosis Date    Anemia     with last pregnancy     Asthma     albuterol prn     Depression     intermittent   no meds    History of depression 06/17/2021    Varicella     had vaccines   [3] No past surgical history on file.  [4]   Family History  Problem Relation Name Age of Onset    Asthma Mother Mikki     Depression Mother Mikki     Other Mother Mikki         Pre eclampsia with son.- resolved    No Known Problems Father David     No Known Problems Half-Sister Jam     No Known Problems Half-Brother Rodrigo     No Known Problems Daughter Serenity     Depression Maternal Grandmother Kayli     Asthma Maternal Grandmother Kayli     Hypertension Maternal Grandmother Kayli     No Known Problems Maternal Grandfather Mayo     Diabetes Paternal Grandmother Kayleen     No Known Problems Paternal Grandfather David     No Known Problems Half-Brother Hiram     No Known Problems Half-Brother Hesham     Ovarian cancer Neg Hx      Cervical cancer Neg Hx      Uterine cancer Neg Hx      Colon cancer Neg Hx      Breast cancer Neg Hx     [5]   Social History  Tobacco Use    Smoking status: Never    Smokeless tobacco: Never   Vaping Use    Vaping status: Never Used   Substance Use Topics    Alcohol use: Not Currently     Comment: occasionally    Drug use: Not Currently   [6]   Current Outpatient Medications:     albuterol (PROVENTIL HFA,VENTOLIN HFA) 90 mcg/act inhaler, Inhale 2 puffs every 4 (four) hours as needed for wheezing, Disp: 6.7 g, Rfl: 4    Prenatal Vit w/Ki-Inmrhvygu-UQ (PNV PO), Take 1 tablet by mouth in the morning Taking gummies, Disp: , Rfl:

## 2025-05-28 NOTE — TELEPHONE ENCOUNTER
Patient was called and discussed the following,    How are you and baby doing in postpartum? Doing good.    How did the delivery go? Quick.    Do you still have bleeding/pain? If so, how much/how severe? No pain no bleeding.    Regular BMs/Urination? Yes urinating, last BM 2 days ago. Suggested Colace. pt has not taken anything.    Have you made your postpartum appointment yet? Scheduled for tomorrow 5/29.    How is feeding going? Are you breastfeeding bottle or both? Breast feeding well.    Has baby seen the pediatrician? Yes    How are you doing emotionally? Good just tired.    Patient denies any questions or concerns at this time. Patient reminded to call office for any questions or concerns. Patient verbalized understanding.    TAMMY Pittman  OB Nurse Navigator

## 2025-05-29 ENCOUNTER — POSTPARTUM VISIT (OUTPATIENT)
Dept: OBGYN CLINIC | Facility: CLINIC | Age: 25
End: 2025-05-29

## 2025-05-29 VITALS
HEIGHT: 62 IN | WEIGHT: 149 LBS | SYSTOLIC BLOOD PRESSURE: 110 MMHG | DIASTOLIC BLOOD PRESSURE: 68 MMHG | BODY MASS INDEX: 27.42 KG/M2

## 2025-05-29 DIAGNOSIS — Z30.09 COUNSELING FOR BIRTH CONTROL REGARDING INTRAUTERINE DEVICE (IUD): ICD-10-CM

## 2025-06-15 ENCOUNTER — PATIENT MESSAGE (OUTPATIENT)
Dept: OBGYN CLINIC | Facility: CLINIC | Age: 25
End: 2025-06-15

## 2025-06-15 DIAGNOSIS — R35.0 FREQUENT URINATION: Primary | ICD-10-CM

## 2025-06-17 PROBLEM — O99.013 ANEMIA DURING PREGNANCY IN THIRD TRIMESTER: Status: RESOLVED | Noted: 2025-03-11 | Resolved: 2025-06-17

## 2025-07-01 ENCOUNTER — APPOINTMENT (OUTPATIENT)
Dept: LAB | Facility: HOSPITAL | Age: 25
End: 2025-07-01
Payer: COMMERCIAL

## 2025-07-01 DIAGNOSIS — O99.013 ANEMIA DURING PREGNANCY IN THIRD TRIMESTER: ICD-10-CM

## 2025-07-01 DIAGNOSIS — R35.0 FREQUENT URINATION: ICD-10-CM

## 2025-07-01 LAB
BACTERIA UR QL AUTO: ABNORMAL /HPF
BASOPHILS # BLD AUTO: 0.05 THOUSANDS/ÂΜL (ref 0–0.1)
BASOPHILS NFR BLD AUTO: 1 % (ref 0–1)
BILIRUB UR QL STRIP: NEGATIVE
CLARITY UR: CLEAR
COLOR UR: ABNORMAL
EOSINOPHIL # BLD AUTO: 0.14 THOUSAND/ÂΜL (ref 0–0.61)
EOSINOPHIL NFR BLD AUTO: 3 % (ref 0–6)
ERYTHROCYTE [DISTWIDTH] IN BLOOD BY AUTOMATED COUNT: 12.8 % (ref 11.6–15.1)
FERRITIN SERPL-MCNC: 33 NG/ML (ref 30–307)
GLUCOSE UR STRIP-MCNC: NEGATIVE MG/DL
HCT VFR BLD AUTO: 33.7 % (ref 34.8–46.1)
HGB BLD-MCNC: 11 G/DL (ref 11.5–15.4)
HGB UR QL STRIP.AUTO: NEGATIVE
IMM GRANULOCYTES # BLD AUTO: 0.01 THOUSAND/UL (ref 0–0.2)
IMM GRANULOCYTES NFR BLD AUTO: 0 % (ref 0–2)
KETONES UR STRIP-MCNC: NEGATIVE MG/DL
LEUKOCYTE ESTERASE UR QL STRIP: ABNORMAL
LYMPHOCYTES # BLD AUTO: 2.24 THOUSANDS/ÂΜL (ref 0.6–4.47)
LYMPHOCYTES NFR BLD AUTO: 40 % (ref 14–44)
MCH RBC QN AUTO: 29.9 PG (ref 26.8–34.3)
MCHC RBC AUTO-ENTMCNC: 32.6 G/DL (ref 31.4–37.4)
MCV RBC AUTO: 92 FL (ref 82–98)
MONOCYTES # BLD AUTO: 0.36 THOUSAND/ÂΜL (ref 0.17–1.22)
MONOCYTES NFR BLD AUTO: 7 % (ref 4–12)
MUCOUS THREADS UR QL AUTO: ABNORMAL
NEUTROPHILS # BLD AUTO: 2.74 THOUSANDS/ÂΜL (ref 1.85–7.62)
NEUTS SEG NFR BLD AUTO: 49 % (ref 43–75)
NITRITE UR QL STRIP: POSITIVE
NON-SQ EPI CELLS URNS QL MICRO: ABNORMAL /HPF
NRBC BLD AUTO-RTO: 0 /100 WBCS
PH UR STRIP.AUTO: 7.5 [PH]
PLATELET # BLD AUTO: 218 THOUSANDS/UL (ref 149–390)
PMV BLD AUTO: 10.9 FL (ref 8.9–12.7)
PROT UR STRIP-MCNC: NEGATIVE MG/DL
RBC # BLD AUTO: 3.68 MILLION/UL (ref 3.81–5.12)
RBC #/AREA URNS AUTO: ABNORMAL /HPF
SP GR UR STRIP.AUTO: 1.02 (ref 1–1.03)
UROBILINOGEN UR STRIP-ACNC: <2 MG/DL
WBC # BLD AUTO: 5.54 THOUSAND/UL (ref 4.31–10.16)
WBC #/AREA URNS AUTO: ABNORMAL /HPF

## 2025-07-01 PROCEDURE — 81001 URINALYSIS AUTO W/SCOPE: CPT

## 2025-07-01 PROCEDURE — 85025 COMPLETE CBC W/AUTO DIFF WBC: CPT

## 2025-07-01 PROCEDURE — 87186 SC STD MICRODIL/AGAR DIL: CPT | Performed by: OBSTETRICS & GYNECOLOGY

## 2025-07-01 PROCEDURE — 82728 ASSAY OF FERRITIN: CPT

## 2025-07-01 PROCEDURE — 87077 CULTURE AEROBIC IDENTIFY: CPT | Performed by: OBSTETRICS & GYNECOLOGY

## 2025-07-01 PROCEDURE — 36415 COLL VENOUS BLD VENIPUNCTURE: CPT

## 2025-07-01 PROCEDURE — 87086 URINE CULTURE/COLONY COUNT: CPT | Performed by: OBSTETRICS & GYNECOLOGY

## 2025-07-02 DIAGNOSIS — N39.0 URINARY TRACT INFECTION WITHOUT HEMATURIA, SITE UNSPECIFIED: Primary | ICD-10-CM

## 2025-07-02 RX ORDER — NITROFURANTOIN 25; 75 MG/1; MG/1
100 CAPSULE ORAL 2 TIMES DAILY
Qty: 14 CAPSULE | Refills: 0 | Status: SHIPPED | OUTPATIENT
Start: 2025-07-02 | End: 2025-07-09

## 2025-07-03 LAB — BACTERIA UR CULT: ABNORMAL

## 2025-07-10 ENCOUNTER — PROCEDURE VISIT (OUTPATIENT)
Dept: OBGYN CLINIC | Facility: CLINIC | Age: 25
End: 2025-07-10
Payer: COMMERCIAL

## 2025-07-10 VITALS
BODY MASS INDEX: 26.13 KG/M2 | DIASTOLIC BLOOD PRESSURE: 70 MMHG | HEIGHT: 62 IN | WEIGHT: 142 LBS | SYSTOLIC BLOOD PRESSURE: 120 MMHG

## 2025-07-10 DIAGNOSIS — Z32.02 NEGATIVE PREGNANCY TEST: ICD-10-CM

## 2025-07-10 DIAGNOSIS — Z11.3 SCREENING FOR STDS (SEXUALLY TRANSMITTED DISEASES): ICD-10-CM

## 2025-07-10 DIAGNOSIS — Z30.430 ENCOUNTER FOR IUD INSERTION: Primary | ICD-10-CM

## 2025-07-10 PROCEDURE — 87491 CHLMYD TRACH DNA AMP PROBE: CPT | Performed by: OBSTETRICS & GYNECOLOGY

## 2025-07-10 PROCEDURE — 81025 URINE PREGNANCY TEST: CPT | Performed by: OBSTETRICS & GYNECOLOGY

## 2025-07-10 PROCEDURE — 87591 N.GONORRHOEAE DNA AMP PROB: CPT | Performed by: OBSTETRICS & GYNECOLOGY

## 2025-07-10 PROCEDURE — 58300 INSERT INTRAUTERINE DEVICE: CPT | Performed by: OBSTETRICS & GYNECOLOGY

## 2025-07-10 NOTE — PATIENT INSTRUCTIONS
"Patient Education     Intrauterine devices (IUDs)   The Basics   Written by the doctors and editors at [facility]   What is an intrauterine device? -- An intrauterine device (\"IUD\") is a type of birth control. It is a small, T-shaped device. A doctor or nurse puts an IUD in your uterus by going through your vagina and cervix (figure 1).  IUDs are made of flexible plastic and have 2 thin plastic strings that hang out of the cervix. They are very small, a little more than 1 inch (2.5 cm) in width and length.  An IUD is one of the safest, most effective methods for preventing pregnancy. It is a good choice for people, including teens, who do not want to get pregnant for at least 1 year. An IUD can also be used to prevent pregnancy if it is put in within 5 days after you have unprotected sex. This is known as \"emergency contraception.\"  You can also use IUDs for reasons other than birth control. For example, 1 type of IUD can be used to treat heavy, painful periods.  What are the different types of IUDs? -- There are 2 categories of IUDs available in the . One type contains copper. The other type contains a hormone called \"levonorgestrel\" (figure 2):   Copper IUD - There is only 1 copper-containing IUD (brand name: Paragard). It can stay in your uterus for 10 years, or longer for some people, to prevent pregnancy. Some people who use it get heavier or longer periods than they had before getting the IUD.   Hormonal IUDs - There are 4 hormone-containing IUDs (brand names: Mirena, Liletta, Kyleena, Neisha) (figure 2). Depending on which of these you have, it can stay in your uterus for up to 8, 5, or 3 years. Many people who use hormonal IUDs have lighter, less painful periods than they had before getting the IUD. Some people stop getting a period at all, but this is not harmful. After having the IUD removed, periods usually return to normal within a month or 2.  Other types of IUDs are also available outside of the " "[place].  What are the benefits of using an IUD? -- The benefits of using an IUD include:   IUDs are very effective. Fewer than 1 in 100 people who use an IUD get pregnant during the first year of use.   You do not have to remember to do anything or take any birth control medicines on a regular basis.   IUDs have few side effects.   IUDs do not contain estrogen, a hormone that some people can't or don't want to take.   If you decide you want to get pregnant, you can have the IUD taken out.   If you use an IUD for several years, it costs less overall than many other types of birth control. That's because there are no costs after you have it inserted.   There is evidence that using an IUD lowers your risk of getting cervical cancer.  What are the downsides of using an IUD? -- The downsides of using an IUD include:   Unlike condoms, an IUD does not protect you against infections that you can catch during sex. These are called \"sexually transmitted infections\" (\"STIs\") or \"sexually transmitted diseases.\" But you and your partner(s) can use condoms to prevent spreading infections.   There is a small chance that the IUD will come out during your period. If this happens, you will need to get a new IUD. If you see your IUD in your underwear, on your pad, or in the toilet, call your doctor or nurse.   The initial cost is higher than the cost of other methods. But, there are no more costs after it is inserted.   Only a doctor or nurse can insert or remove an IUD.  You should not get an IUD if you recently had an infection that spread to your uterus and other nearby organs. This is called a \"pelvic infection.\" STIs such as chlamydia and gonorrhea can cause pelvic infections.  Which type of IUD is best for me? -- Your nurse or doctor can talk to you about the options and help you choose the right IUD for you.  A copper IUD might be a good choice if you:   Want or need to avoid hormones   Want to avoid big changes in your period, " "such as not having any periods or bleeding or spotting between periods   Want birth control for up to 10 years, or possibly longer  A hormonal IUD might be a good choice if you:   Have heavy, painful periods. These IUDs can make your periods lighter and less painful.   Have pelvic pain from a condition called \"endometriosis.\" These IUDs might help reduce the pain.   Want birth control for up to 8 years, depending on which device you choose  Does it hurt to have an IUD put in? -- You will likely feel some discomfort and slight cramping after the nurse or doctor puts the IUD in your uterus. People who have never given birth might feel more discomfort than people who have. The cramps generally go away within a day or 2. Over-the-counter pain medicines like ibuprofen (sample brand names: Advil, Motrin) or naproxen (sample brand name: Aleve) can help cramps go away faster.  After the IUD is in place, you should not be able to feel it.  Should I see a doctor or nurse? -- If you have an IUD, see your doctor or nurse right away if:   You have bad pain in your lower belly.   Your period is late or very different from normal.   You cannot feel the string of the IUD or if the string seems shorter than usual.   You think your IUD might have moved or fallen out.   You had sex with someone who has or might have an STI, or you think you have an STI.   You have an unexplained fever.  All topics are updated as new evidence becomes available and our peer review process is complete.  This topic retrieved from [employer] on: Feb 26, 2024.  Topic 03802 Version 21.0  Release: 32.2.4 - C32.56  © 2024 UpToDate, Inc. and/or its affiliates. All rights reserved.  figure 1: Female reproductive anatomy     The internal organs that make up the female reproductive system are located in the lower belly. These include the uterus, fallopian tubes, ovaries, cervix, and vagina.  The uterus has an inner lining, called the \"endometrium,\" and a thicker " "outer layer, called the \"myometrium.\"  Graphic 00723 Version 8.0  figure 2: IUDs     This picture shows 2 types of IUDs. There are different IUDs available. They are placed inside the uterus to help prevent pregnancy. Some hormonal IUDs can help reduce menstrual bleeding. The copper IUD can actually make menstrual bleeding heavier.  Graphic 07889 Version 15.0  Consumer Information Use and Disclaimer   Disclaimer: This generalized information is a limited summary of diagnosis, treatment, and/or medication information. It is not meant to be comprehensive and should be used as a tool to help the user understand and/or assess potential diagnostic and treatment options. It does NOT include all information about conditions, treatments, medications, side effects, or risks that may apply to a specific patient. It is not intended to be medical advice or a substitute for the medical advice, diagnosis, or treatment of a health care provider based on the health care provider's examination and assessment of a patient's specific and unique circumstances. Patients must speak with a health care provider for complete information about their health, medical questions, and treatment options, including any risks or benefits regarding use of medications. This information does not endorse any treatments or medications as safe, effective, or approved for treating a specific patient. UpToDate, Inc. and its affiliates disclaim any warranty or liability relating to this information or the use thereof.The use of this information is governed by the Terms of Use, available at https://www.[employer].com/en/know/clinical-effectiveness-terms. 2024© UpToDate, Inc. and its affiliates and/or licensors. All rights reserved.  Copyright   © 2024 Up[employer]Date, Inc. and/or its affiliates. All rights reserved.    "

## 2025-07-10 NOTE — PROGRESS NOTES
IUD Procedure    Date/Time: 7/10/2025 11:57 AM    Performed by: ANGIE Walker  Authorized by: ANGIE Walker    Other Assisting Provider: Yes (comment)    Verbal consent obtained?: Yes    Written consent obtained?: Yes    Risks and benefits: Risks, benefits and alternatives were discussed    Consent given by:  Patient  Time Out:     Time out: Immediately prior to the procedure a time out was called    Patient states understanding of procedure being performed: Yes    Patient's understanding of procedure matches consent: Yes    Procedure consent matches procedure scheduled: Yes    Required items: Required blood products, implants, devices and special equipment available    Patient identity confirmed:  Verbally with patient  Select procedure: IUD insertion    IUD Insertion:     Pelvic exam performed: yes      Negative GC/chlamydia test: no (Collected today)      Negative urine pregnancy test: yes      Cervix cleaned and prepped: yes      Speculum placed in vagina: yes      Tenaculum applied to cervix: no      IUD inserted with no complications: yes      Strings trimmed: yes      Uterus sounded: yes      Uterus sound depth (cm):  7    IUD type:  1 each Levonorgestrel 20 MCG/DAY  Post-procedure:     Patient tolerated procedure well: yes      Patient will follow up after next period: yes    Insertion Comments:      Procedure explained to patient, benefits, risks, side effects reviewed.  Verbal Consent obtained. Pt reports she has not had intercourse in the past 2 weeks  IUD placed without difficulty  Hemostasis appreciated post insertion  Patient advised nothing in the vagina for 1 week, advised to monitor for signs of infection such as abnormal discharge or odor, fevers or chills or pelvic pain  Return to the office for string check in 6-8 weeks

## 2025-07-11 LAB
C TRACH DNA SPEC QL NAA+PROBE: NEGATIVE
N GONORRHOEA DNA SPEC QL NAA+PROBE: NEGATIVE